# Patient Record
Sex: MALE | Race: WHITE | NOT HISPANIC OR LATINO | Employment: OTHER | URBAN - METROPOLITAN AREA
[De-identification: names, ages, dates, MRNs, and addresses within clinical notes are randomized per-mention and may not be internally consistent; named-entity substitution may affect disease eponyms.]

---

## 2022-11-11 ENCOUNTER — HOSPITAL ENCOUNTER (INPATIENT)
Facility: HOSPITAL | Age: 82
LOS: 6 days | Discharge: HOME | DRG: 378 | End: 2022-11-17
Attending: STUDENT IN AN ORGANIZED HEALTH CARE EDUCATION/TRAINING PROGRAM | Admitting: INTERNAL MEDICINE
Payer: MEDICARE

## 2022-11-11 DIAGNOSIS — D64.9 SYMPTOMATIC ANEMIA: Primary | ICD-10-CM

## 2022-11-11 DIAGNOSIS — C79.9 METASTATIC CARCINOMA: ICD-10-CM

## 2022-11-11 DIAGNOSIS — K92.2 UPPER GI BLEEDING: ICD-10-CM

## 2022-11-11 DIAGNOSIS — R19.5 DARK STOOLS: ICD-10-CM

## 2022-11-11 PROBLEM — R19.00 ABDOMINAL MASS: Status: ACTIVE | Noted: 2022-11-11

## 2022-11-11 LAB
ALBUMIN SERPL-MCNC: 3.3 GM/DL (ref 3.4–4.8)
ALBUMIN/GLOB SERPL: 0.7 RATIO (ref 1.1–2)
ALP SERPL-CCNC: 125 UNIT/L (ref 40–150)
ALT SERPL-CCNC: 11 UNIT/L (ref 0–55)
APPEARANCE UR: CLEAR
APTT PPP: 31.8 SECONDS (ref 23.2–33.7)
AST SERPL-CCNC: 22 UNIT/L (ref 5–34)
BACTERIA #/AREA URNS AUTO: NORMAL /HPF
BASOPHILS # BLD AUTO: 0.16 X10(3)/MCL (ref 0–0.2)
BASOPHILS NFR BLD AUTO: 0.7 %
BILIRUB UR QL STRIP.AUTO: NEGATIVE MG/DL
BILIRUBIN DIRECT+TOT PNL SERPL-MCNC: 0.5 MG/DL
BUN SERPL-MCNC: 28.4 MG/DL (ref 8.4–25.7)
CALCIUM SERPL-MCNC: 9.9 MG/DL (ref 8.8–10)
CHLORIDE SERPL-SCNC: 103 MMOL/L (ref 98–107)
CO2 SERPL-SCNC: 26 MMOL/L (ref 23–31)
COLOR UR AUTO: YELLOW
CREAT SERPL-MCNC: 1.32 MG/DL (ref 0.73–1.18)
EOSINOPHIL # BLD AUTO: 0.54 X10(3)/MCL (ref 0–0.9)
EOSINOPHIL NFR BLD AUTO: 2.2 %
ERYTHROCYTE [DISTWIDTH] IN BLOOD BY AUTOMATED COUNT: 15.5 % (ref 11.5–17)
GFR SERPLBLD CREATININE-BSD FMLA CKD-EPI: 55 MLS/MIN/1.73/M2
GLOBULIN SER-MCNC: 4.6 GM/DL (ref 2.4–3.5)
GLUCOSE SERPL-MCNC: 111 MG/DL (ref 82–115)
GLUCOSE UR QL STRIP.AUTO: NEGATIVE MG/DL
HCT VFR BLD AUTO: 26.7 % (ref 42–52)
HGB BLD-MCNC: 8.8 GM/DL (ref 14–18)
IMM GRANULOCYTES # BLD AUTO: 0.15 X10(3)/MCL (ref 0–0.04)
IMM GRANULOCYTES NFR BLD AUTO: 0.6 %
INR BLD: 1.12 (ref 0–1.3)
KETONES UR QL STRIP.AUTO: NEGATIVE MG/DL
LACTATE SERPL-SCNC: 1.2 MMOL/L (ref 0.5–2.2)
LEUKOCYTE ESTERASE UR QL STRIP.AUTO: NEGATIVE UNIT/L
LIPASE SERPL-CCNC: 16 U/L
LYMPHOCYTES # BLD AUTO: 2.61 X10(3)/MCL (ref 0.6–4.6)
LYMPHOCYTES NFR BLD AUTO: 10.7 %
MAGNESIUM SERPL-MCNC: 2.1 MG/DL (ref 1.6–2.6)
MCH RBC QN AUTO: 32.8 PG (ref 27–31)
MCHC RBC AUTO-ENTMCNC: 33 MG/DL (ref 33–36)
MCV RBC AUTO: 99.6 FL (ref 80–94)
MONOCYTES # BLD AUTO: 1.65 X10(3)/MCL (ref 0.1–1.3)
MONOCYTES NFR BLD AUTO: 6.7 %
NEUTROPHILS # BLD AUTO: 19.4 X10(3)/MCL (ref 2.1–9.2)
NEUTROPHILS NFR BLD AUTO: 79.1 %
NITRITE UR QL STRIP.AUTO: NEGATIVE
NRBC BLD AUTO-RTO: 0 %
PH UR STRIP.AUTO: 5 [PH]
PLATELET # BLD AUTO: 394 X10(3)/MCL (ref 130–400)
PMV BLD AUTO: 8.2 FL (ref 7.4–10.4)
POTASSIUM SERPL-SCNC: 4.9 MMOL/L (ref 3.5–5.1)
PROT SERPL-MCNC: 7.9 GM/DL (ref 5.8–7.6)
PROT UR QL STRIP.AUTO: NEGATIVE MG/DL
PROTHROMBIN TIME: 14.3 SECONDS (ref 12.5–14.5)
RBC # BLD AUTO: 2.68 X10(6)/MCL (ref 4.7–6.1)
RBC #/AREA URNS AUTO: <5 /HPF
RBC UR QL AUTO: NEGATIVE UNIT/L
SODIUM SERPL-SCNC: 134 MMOL/L (ref 136–145)
SP GR UR STRIP.AUTO: 1.02 (ref 1–1.03)
SQUAMOUS #/AREA URNS AUTO: <5 /HPF
UROBILINOGEN UR STRIP-ACNC: 1 MG/DL
WBC # SPEC AUTO: 24.5 X10(3)/MCL (ref 4.5–11.5)
WBC #/AREA URNS AUTO: <5 /HPF

## 2022-11-11 PROCEDURE — 63600175 PHARM REV CODE 636 W HCPCS: Performed by: PHYSICIAN ASSISTANT

## 2022-11-11 PROCEDURE — 96374 THER/PROPH/DIAG INJ IV PUSH: CPT

## 2022-11-11 PROCEDURE — 11000001 HC ACUTE MED/SURG PRIVATE ROOM

## 2022-11-11 PROCEDURE — C9113 INJ PANTOPRAZOLE SODIUM, VIA: HCPCS | Performed by: PHYSICIAN ASSISTANT

## 2022-11-11 PROCEDURE — 83690 ASSAY OF LIPASE: CPT | Performed by: NURSE PRACTITIONER

## 2022-11-11 PROCEDURE — 25500020 PHARM REV CODE 255: Performed by: PHYSICIAN ASSISTANT

## 2022-11-11 PROCEDURE — 85730 THROMBOPLASTIN TIME PARTIAL: CPT | Performed by: NURSE PRACTITIONER

## 2022-11-11 PROCEDURE — 83735 ASSAY OF MAGNESIUM: CPT | Performed by: NURSE PRACTITIONER

## 2022-11-11 PROCEDURE — 87040 BLOOD CULTURE FOR BACTERIA: CPT | Performed by: NURSE PRACTITIONER

## 2022-11-11 PROCEDURE — 85025 COMPLETE CBC W/AUTO DIFF WBC: CPT | Performed by: NURSE PRACTITIONER

## 2022-11-11 PROCEDURE — 80053 COMPREHEN METABOLIC PANEL: CPT | Performed by: NURSE PRACTITIONER

## 2022-11-11 PROCEDURE — 99285 EMERGENCY DEPT VISIT HI MDM: CPT | Mod: 25

## 2022-11-11 PROCEDURE — 85610 PROTHROMBIN TIME: CPT | Performed by: NURSE PRACTITIONER

## 2022-11-11 PROCEDURE — 83605 ASSAY OF LACTIC ACID: CPT | Performed by: NURSE PRACTITIONER

## 2022-11-11 PROCEDURE — 81001 URINALYSIS AUTO W/SCOPE: CPT | Performed by: NURSE PRACTITIONER

## 2022-11-11 RX ORDER — CALCIUM CARBONATE/VITAMIN D3 400-133.3
TABLET ORAL DAILY
COMMUNITY
Start: 2022-09-16 | End: 2022-11-21 | Stop reason: CLARIF

## 2022-11-11 RX ORDER — AMLODIPINE BESYLATE 5 MG/1
5 TABLET ORAL DAILY
Status: DISCONTINUED | OUTPATIENT
Start: 2022-11-12 | End: 2022-11-17 | Stop reason: HOSPADM

## 2022-11-11 RX ORDER — ERGOCALCIFEROL 1.25 MG/1
50000 CAPSULE ORAL
COMMUNITY
Start: 2022-10-28 | End: 2022-11-20

## 2022-11-11 RX ORDER — ASPIRIN 81 MG/1
81 TABLET ORAL DAILY
COMMUNITY
Start: 2022-10-16 | End: 2022-11-20

## 2022-11-11 RX ORDER — TALC
6 POWDER (GRAM) TOPICAL NIGHTLY PRN
Status: DISCONTINUED | OUTPATIENT
Start: 2022-11-12 | End: 2022-11-17 | Stop reason: HOSPADM

## 2022-11-11 RX ORDER — QUINAPRIL 40 MG/1
40 TABLET ORAL DAILY
COMMUNITY
Start: 2022-10-08 | End: 2022-11-20

## 2022-11-11 RX ORDER — ALLOPURINOL 300 MG/1
300 TABLET ORAL DAILY
Status: DISCONTINUED | OUTPATIENT
Start: 2022-11-12 | End: 2022-11-17 | Stop reason: HOSPADM

## 2022-11-11 RX ORDER — AMLODIPINE BESYLATE 5 MG/1
5 TABLET ORAL DAILY
COMMUNITY
Start: 2022-07-19 | End: 2022-11-20

## 2022-11-11 RX ORDER — SODIUM CHLORIDE, SODIUM LACTATE, POTASSIUM CHLORIDE, CALCIUM CHLORIDE 600; 310; 30; 20 MG/100ML; MG/100ML; MG/100ML; MG/100ML
INJECTION, SOLUTION INTRAVENOUS CONTINUOUS
Status: DISCONTINUED | OUTPATIENT
Start: 2022-11-12 | End: 2022-11-16

## 2022-11-11 RX ORDER — ROSUVASTATIN CALCIUM 5 MG/1
5 TABLET, COATED ORAL DAILY
COMMUNITY
Start: 2022-09-18 | End: 2022-11-20

## 2022-11-11 RX ORDER — ATORVASTATIN CALCIUM 10 MG/1
20 TABLET, FILM COATED ORAL DAILY
Status: DISCONTINUED | OUTPATIENT
Start: 2022-11-12 | End: 2022-11-17 | Stop reason: HOSPADM

## 2022-11-11 RX ORDER — RANOLAZINE 500 MG/1
500 TABLET, EXTENDED RELEASE ORAL 2 TIMES DAILY
COMMUNITY
Start: 2022-10-16 | End: 2022-11-20

## 2022-11-11 RX ORDER — RANOLAZINE 500 MG/1
500 TABLET, EXTENDED RELEASE ORAL 2 TIMES DAILY
Status: DISCONTINUED | OUTPATIENT
Start: 2022-11-12 | End: 2022-11-17 | Stop reason: HOSPADM

## 2022-11-11 RX ORDER — ALLOPURINOL 300 MG/1
300 TABLET ORAL DAILY
COMMUNITY
Start: 2022-09-09 | End: 2022-11-20

## 2022-11-11 RX ORDER — PANTOPRAZOLE SODIUM 40 MG/10ML
40 INJECTION, POWDER, LYOPHILIZED, FOR SOLUTION INTRAVENOUS
Status: COMPLETED | OUTPATIENT
Start: 2022-11-11 | End: 2022-11-11

## 2022-11-11 RX ORDER — TICAGRELOR 60 MG/1
60 TABLET ORAL 2 TIMES DAILY
COMMUNITY
Start: 2022-10-13 | End: 2022-11-21 | Stop reason: CLARIF

## 2022-11-11 RX ORDER — LISINOPRIL 5 MG/1
5 TABLET ORAL DAILY
Status: DISCONTINUED | OUTPATIENT
Start: 2022-11-12 | End: 2022-11-17 | Stop reason: HOSPADM

## 2022-11-11 RX ORDER — NITROGLYCERIN 0.4 MG/1
0.4 TABLET SUBLINGUAL
COMMUNITY
Start: 2022-02-24

## 2022-11-11 RX ORDER — PANTOPRAZOLE SODIUM 40 MG/10ML
40 INJECTION, POWDER, LYOPHILIZED, FOR SOLUTION INTRAVENOUS EVERY 12 HOURS
Status: DISCONTINUED | OUTPATIENT
Start: 2022-11-12 | End: 2022-11-17 | Stop reason: HOSPADM

## 2022-11-11 RX ORDER — SODIUM CHLORIDE 0.9 % (FLUSH) 0.9 %
10 SYRINGE (ML) INJECTION
Status: DISCONTINUED | OUTPATIENT
Start: 2022-11-12 | End: 2022-11-17 | Stop reason: HOSPADM

## 2022-11-11 RX ADMIN — IOPAMIDOL 100 ML: 755 INJECTION, SOLUTION INTRAVENOUS at 06:11

## 2022-11-11 RX ADMIN — PANTOPRAZOLE SODIUM 40 MG: 40 INJECTION, POWDER, FOR SOLUTION INTRAVENOUS at 07:11

## 2022-11-11 NOTE — ED PROVIDER NOTES
Encounter Date: 11/11/2022       History     Chief Complaint   Patient presents with    Abnormal Lab     Pt sent from PCP for elevated WBC.  States has been having abd pain for last few months.  States very dark stool x 1 week.  Denies n/v/d.  Also c/o left sided flank pain.      82-year-old male came in for further evaluation.  His primary physician Dr. Iam Rojo has been monitoring his white blood cell count and hemoglobin for the last week.  Patient states that he has very dark stool for about a week.  He does feel nauseated but not vomiting.  Complaints of left-sided flank pain.  He did follow up with GI in September with Dr. Hitchcock and he was told that everything was normal .       Abdominal Pain  The current episode started several days ago. The problem has not changed since onset.The abdominal pain is located in the epigastric region. The abdominal pain radiates to the left flank. The abdominal pain is relieved by nothing. The other symptoms of the illness include nausea. The other symptoms of the illness do not include fever, jaundice, shortness of breath, vomiting, diarrhea or dysuria.   Nausea began 6 to 7 days ago.   Risk factors for an acute abdominal problem include being elderly. Additional symptoms associated with the illness include chills and back pain. Symptoms associated with the illness do not include anorexia or heartburn.   Review of patient's allergies indicates:  No Known Allergies  No past medical history on file.  No past surgical history on file.  No family history on file.     Review of Systems   Constitutional:  Positive for chills. Negative for fever.   HENT:  Negative for sore throat.    Respiratory:  Negative for shortness of breath.    Cardiovascular:  Negative for chest pain.   Gastrointestinal:  Positive for abdominal pain and nausea. Negative for anorexia, diarrhea, heartburn, jaundice and vomiting.   Genitourinary:  Negative for dysuria.   Musculoskeletal:  Positive for back  pain.   Skin:  Negative for rash.   Neurological:  Negative for weakness.   Hematological:  Does not bruise/bleed easily.     Physical Exam     Initial Vitals [11/11/22 1226]   BP Pulse Resp Temp SpO2   122/61 71 19 97.3 °F (36.3 °C) 99 %      MAP       --         Physical Exam    Nursing note and vitals reviewed.  Constitutional: He appears well-developed and well-nourished.   Cardiovascular:  Normal rate.           Pulmonary/Chest: Breath sounds normal.   Abdominal: Bowel sounds are normal. There is generalized abdominal tenderness and tenderness in the epigastric area.   There is left CVA tenderness. There is no tenderness at McBurney's point and negative Short's sign.     Neurological: He is alert and oriented to person, place, and time. He has normal strength.   Skin: Skin is warm.   Psychiatric: His behavior is normal. Judgment and thought content normal.       ED Course   Procedures  Labs Reviewed   CBC WITH DIFFERENTIAL - Abnormal; Notable for the following components:       Result Value    WBC 24.5 (*)     RBC 2.68 (*)     Hgb 8.8 (*)     Hct 26.7 (*)     MCV 99.6 (*)     MCH 32.8 (*)     Neut # 19.4 (*)     Mono # 1.65 (*)     IG# 0.15 (*)     All other components within normal limits   COMPREHENSIVE METABOLIC PANEL - Abnormal; Notable for the following components:    Sodium Level 134 (*)     Blood Urea Nitrogen 28.4 (*)     Creatinine 1.32 (*)     Protein Total 7.9 (*)     Albumin Level 3.3 (*)     Globulin 4.6 (*)     Albumin/Globulin Ratio 0.7 (*)     All other components within normal limits   LACTIC ACID, PLASMA - Normal   PROTIME-INR - Normal   APTT - Normal   LIPASE - Normal   MAGNESIUM - Normal   URINALYSIS, REFLEX TO URINE CULTURE - Normal   URINALYSIS, MICROSCOPIC - Normal   BLOOD CULTURE OLG   BLOOD CULTURE OLG          Imaging Results              CT Abdomen Pelvis With Contrast (Final result)  Result time 11/11/22 18:21:26      Final result by Jj Granado MD (11/11/22 18:21:26)                    Impression:      Very large aggressive mass seen in the left upper quadrant.  It involves the tail the pancreas and the stomach and the spleen.  It is difficult to tell with their the masses originating from the stomach or the pancreas.    There is metastasis seen in the liver in segment 4 and segment 6    Right lower lobe lung nodules concerning for metastatic disease      Electronically signed by: Jj Joshstuart  Date:    11/11/2022  Time:    18:21               Narrative:    EXAMINATION:  CT ABDOMEN PELVIS WITH CONTRAST    CLINICAL HISTORY:  Abdominal pain, acute, nonlocalized;    TECHNIQUE:  Low dose axial images, sagittal and coronal reformations were obtained from the lung bases to the pubic symphysis following the IV administration of contrast. Automatic exposure control (AEC) is utilized to reduce patient radiation exposure.    COMPARISON:  None.    FINDINGS:  There is a area of nodularity in the right lower lobe posterior basal aspect.  It measures 2 cm by 1.8 cm.  Another punctate nodule seen in the right lower lobe on image number 18 series 3.  It is 4 mm in size..    There is a mass seen in the dome of the liver is in segment 4.  It measures 3.3 by 3.3 cm.  There is another mass seen along the inferior margin liver on image number 30 of series 2.  It is seen in segment 6.  It measures 1.2 cm..    There is a large mass seen in the stomach involving the fundus of the stomach and the greater curvature.  There is circumferential wall thickening seen in the body of the stomach.  The mass has a heterogeneous appearance.  Rough measurements are 12 cm x 9 cm.  There is a mass also seen in the tail the pancreas which extends into the region of the spleen and the stomach.  It is uncertain if the mass is originating from the tail the pancreas or the stomach.  Mass extends into the left upper quadrant and measures 10 cm x 10 cm..    Gallbladder appears unremarkable.    The adrenal glands appear  normal.  No adrenal nodule is seen.    The kidneys appear normal.  There is a cyst in the left kidney in the midpole.  No hydronephrosis is seen.  No hydroureter is seen.  No nephrolithiasis is seen.  No obvious ureteral stones are seen.    Urinary bladder appears grossly unremarkable.    The prostate is enlarged in size..    No colitis is seen.  No diverticulitis is seen.  No obvious colonic mass or lesion is seen.    No free air is seen.  No free fluid is seen.                                       X-Ray Chest PA And Lateral (Final result)  Result time 11/11/22 12:51:08      Final result by Toby Lee MD (11/11/22 12:51:08)                   Impression:      No acute cardiopulmonary process identified.      Electronically signed by: Toby Lee  Date:    11/11/2022  Time:    12:51               Narrative:    EXAMINATION:  XR CHEST PA AND LATERAL    CLINICAL HISTORY:  cough;    TECHNIQUE:  Two-view    COMPARISON:  None available.    FINDINGS:  Cardiopericardial silhouette is within normal limits.  No acute dense focal or segmental consolidation, congestion, pleural effusion or pneumothorax.  Bilateral shoulder arthroplasties.                                       Medications   iopamidoL (ISOVUE-370) injection 100 mL (100 mLs Intravenous Given 11/11/22 1810)   pantoprazole injection 40 mg (40 mg Intravenous Given 11/11/22 1937)     Medical Decision Making:   Differential Diagnosis:   Upper GI bleed  Symptomatic anemia  Dark stool   Elevated white blood cell count     Clinical Tests:   Lab Tests: Reviewed  The following lab test(s) were unremarkable: CBC, CMP, Lipase, PTT, PT and Urinalysis  Radiological Study: Reviewed  Other:   I have discussed this case with another health care provider.       <> Summary of the Discussion: Discussed case with Dr. Matthews here at ED . Plan to Admit pt for Gi and Oncology consult.   Pt followed up with GI Dr. Hitchcock on 9/22/22 upper Barium IMPRESSION: Small sliding-type hiatal  hernia, mild dysmotility and  gastroesophageal reflux.    Spoke with [place] with Dr. Jayesh Han. Will be admitting patient . Plan follow up with GI and Oncology            ED Course as of 11/11/22 2047 Fri Nov 11, 2022 1707 Lipase: 16 [YG]   1707 Sodium(!): 134 [YG]   1707 Potassium: 4.9 [YG]   1707 Creatinine(!): 1.32 [YG]   1707 Albumin(!): 3.3 [YG]   1707 Globulin, Total(!): 4.6 [YG]   1707 Albumin/Globulin Ratio(!): 0.7 [YG]   1708 Lactate, [place]: 1.2 [YG]   1708 WBC(!): 24.5 [YG]   1708 Hemoglobin(!): 8.8 [YG]   1708 Hematocrit(!): 26.7 [YG]   1708 aPTT: 31.8 [YG]   1708 INR: 1.12 [YG]   1708 Ketones, UA: Negative [YG]   1708 Occult Blood UA: Negative [YG]   1708 Urobilinogen, UA: 1.0 [YG]   1911 Magnesium: 2.10 [YG]   1911 Lipase: 16 [YG]   1911 Sodium(!): 134 [YG]   1912 ALT: 11 [YG]   1912 AST: 22 [YG]   1912 WBC(!): 24.5 [YG]   1912 RBC(!): 2.68 [YG]   1912 Hemoglobin(!): 8.8 [YG]   1912 Hematocrit(!): 26.7 [YG]   1912 MCV(!): 99.6 [YG]   1912 MCH(!): 32.8 [YG]   1912 aPTT: 31.8 [YG]   1912 Protime: 14.3 [YG]   1912 INR: 1.12 [YG]   1912 RBC, UA: <5 [YG]   1912 Leukocytes, UA: Negative [YG]   1912 NITRITE UA: Negative [YG]      ED Course User Index  [YG] MELECIO Carlos                 Clinical Impression:   Final diagnoses:  [D64.9] Symptomatic anemia (Primary)  [R19.5] Dark stools  [K92.2] Upper GI bleeding  [C79.9] Metastatic carcinoma      ED Disposition Condition    Admit Stable                MELECIO Carlos  11/11/22 2003       MELECIO Carlos  11/11/22 2047

## 2022-11-11 NOTE — FIRST PROVIDER EVALUATION
Medical screening examination initiated.  I have conducted a focused provider triage encounter, findings are as follows:    Brief history of present illness:  81 y/o male presents with having outpatient labs which showed elevated wbc and some anemia. C/o upset stomach for months and left side pain. No n/v/d. States black stools x 1 week however he did take pepto bismol for a while for his upset stomach, stopped taking it recently. Mild cough.     There were no vitals filed for this visit.    Pertinent physical exam:  alert, nonlabored, ambulatory    Brief workup plan:  labs, urine    Preliminary workup initiated; this workup will be continued and followed by the physician or advanced practice provider that is assigned to the patient when roomed.

## 2022-11-12 LAB
ABO + RH BLD: NORMAL
ABORH RETYPE: NORMAL
ALBUMIN SERPL-MCNC: 2.9 GM/DL (ref 3.4–4.8)
ALBUMIN/GLOB SERPL: 0.9 RATIO (ref 1.1–2)
ALP SERPL-CCNC: 108 UNIT/L (ref 40–150)
ALT SERPL-CCNC: 11 UNIT/L (ref 0–55)
AST SERPL-CCNC: 19 UNIT/L (ref 5–34)
BASOPHILS # BLD AUTO: 0.1 X10(3)/MCL (ref 0–0.2)
BASOPHILS NFR BLD AUTO: 0.5 %
BILIRUBIN DIRECT+TOT PNL SERPL-MCNC: 0.5 MG/DL
BLD PROD TYP BPU: NORMAL
BLOOD UNIT EXPIRATION DATE: NORMAL
BLOOD UNIT TYPE CODE: 2800
BUN SERPL-MCNC: 24.7 MG/DL (ref 8.4–25.7)
CALCIUM SERPL-MCNC: 9 MG/DL (ref 8.8–10)
CHLORIDE SERPL-SCNC: 103 MMOL/L (ref 98–107)
CO2 SERPL-SCNC: 25 MMOL/L (ref 23–31)
COLOR STL: ABNORMAL
CONSISTENCY STL: ABNORMAL
CREAT SERPL-MCNC: 1.18 MG/DL (ref 0.73–1.18)
CROSSMATCH INTERPRETATION: NORMAL
DISPENSE STATUS: NORMAL
EOSINOPHIL # BLD AUTO: 0.47 X10(3)/MCL (ref 0–0.9)
EOSINOPHIL NFR BLD AUTO: 2.3 %
ERYTHROCYTE [DISTWIDTH] IN BLOOD BY AUTOMATED COUNT: 15.4 % (ref 11.5–17)
GFR SERPLBLD CREATININE-BSD FMLA CKD-EPI: >60 MLS/MIN/1.73/M2
GLOBULIN SER-MCNC: 3.3 GM/DL (ref 2.4–3.5)
GLUCOSE SERPL-MCNC: 109 MG/DL (ref 82–115)
GROUP & RH: NORMAL
HCT VFR BLD AUTO: 23.6 % (ref 42–52)
HEMOCCULT SP1 STL QL: POSITIVE
HGB BLD-MCNC: 7.6 GM/DL (ref 14–18)
IMM GRANULOCYTES # BLD AUTO: 0.12 X10(3)/MCL (ref 0–0.04)
IMM GRANULOCYTES NFR BLD AUTO: 0.6 %
INDIRECT COOMBS GEL: NORMAL
LYMPHOCYTES # BLD AUTO: 2.11 X10(3)/MCL (ref 0.6–4.6)
LYMPHOCYTES NFR BLD AUTO: 10.1 %
MCH RBC QN AUTO: 32.1 PG (ref 27–31)
MCHC RBC AUTO-ENTMCNC: 32.2 MG/DL (ref 33–36)
MCV RBC AUTO: 99.6 FL (ref 80–94)
MONOCYTES # BLD AUTO: 1.38 X10(3)/MCL (ref 0.1–1.3)
MONOCYTES NFR BLD AUTO: 6.6 %
NEUTROPHILS # BLD AUTO: 16.6 X10(3)/MCL (ref 2.1–9.2)
NEUTROPHILS NFR BLD AUTO: 79.9 %
NRBC BLD AUTO-RTO: 0 %
PLATELET # BLD AUTO: 358 X10(3)/MCL (ref 130–400)
PMV BLD AUTO: 8.1 FL (ref 7.4–10.4)
POTASSIUM SERPL-SCNC: 4.7 MMOL/L (ref 3.5–5.1)
PROT SERPL-MCNC: 6.2 GM/DL (ref 5.8–7.6)
RBC # BLD AUTO: 2.37 X10(6)/MCL (ref 4.7–6.1)
SODIUM SERPL-SCNC: 136 MMOL/L (ref 136–145)
UNIT NUMBER: NORMAL
WBC # SPEC AUTO: 20.8 X10(3)/MCL (ref 4.5–11.5)

## 2022-11-12 PROCEDURE — 36415 COLL VENOUS BLD VENIPUNCTURE: CPT | Performed by: INTERNAL MEDICINE

## 2022-11-12 PROCEDURE — 21400001 HC TELEMETRY ROOM

## 2022-11-12 PROCEDURE — P9016 RBC LEUKOCYTES REDUCED: HCPCS | Performed by: INTERNAL MEDICINE

## 2022-11-12 PROCEDURE — 86901 BLOOD TYPING SEROLOGIC RH(D): CPT | Performed by: INTERNAL MEDICINE

## 2022-11-12 PROCEDURE — 63600175 PHARM REV CODE 636 W HCPCS: Performed by: INTERNAL MEDICINE

## 2022-11-12 PROCEDURE — 86923 COMPATIBILITY TEST ELECTRIC: CPT | Performed by: INTERNAL MEDICINE

## 2022-11-12 PROCEDURE — C9113 INJ PANTOPRAZOLE SODIUM, VIA: HCPCS | Performed by: INTERNAL MEDICINE

## 2022-11-12 PROCEDURE — 80053 COMPREHEN METABOLIC PANEL: CPT | Performed by: INTERNAL MEDICINE

## 2022-11-12 PROCEDURE — 85025 COMPLETE CBC W/AUTO DIFF WBC: CPT | Performed by: INTERNAL MEDICINE

## 2022-11-12 PROCEDURE — 25000003 PHARM REV CODE 250: Performed by: INTERNAL MEDICINE

## 2022-11-12 PROCEDURE — 82270 OCCULT BLOOD FECES: CPT | Performed by: INTERNAL MEDICINE

## 2022-11-12 PROCEDURE — 36430 TRANSFUSION BLD/BLD COMPNT: CPT

## 2022-11-12 PROCEDURE — 63600175 PHARM REV CODE 636 W HCPCS

## 2022-11-12 RX ORDER — MORPHINE SULFATE 4 MG/ML
INJECTION, SOLUTION INTRAMUSCULAR; INTRAVENOUS
Status: COMPLETED
Start: 2022-11-12 | End: 2022-11-12

## 2022-11-12 RX ORDER — ONDANSETRON 2 MG/ML
4 INJECTION INTRAMUSCULAR; INTRAVENOUS EVERY 6 HOURS PRN
Status: DISCONTINUED | OUTPATIENT
Start: 2022-11-12 | End: 2022-11-17 | Stop reason: HOSPADM

## 2022-11-12 RX ORDER — ONDANSETRON 2 MG/ML
INJECTION INTRAMUSCULAR; INTRAVENOUS
Status: COMPLETED
Start: 2022-11-12 | End: 2022-11-12

## 2022-11-12 RX ORDER — HYDROCODONE BITARTRATE AND ACETAMINOPHEN 500; 5 MG/1; MG/1
TABLET ORAL
Status: DISCONTINUED | OUTPATIENT
Start: 2022-11-12 | End: 2022-11-17 | Stop reason: HOSPADM

## 2022-11-12 RX ORDER — MORPHINE SULFATE 4 MG/ML
2 INJECTION, SOLUTION INTRAMUSCULAR; INTRAVENOUS EVERY 4 HOURS PRN
Status: DISCONTINUED | OUTPATIENT
Start: 2022-11-12 | End: 2022-11-17 | Stop reason: HOSPADM

## 2022-11-12 RX ADMIN — RANOLAZINE 500 MG: 500 TABLET, EXTENDED RELEASE ORAL at 09:11

## 2022-11-12 RX ADMIN — SODIUM CHLORIDE, POTASSIUM CHLORIDE, SODIUM LACTATE AND CALCIUM CHLORIDE: 600; 310; 30; 20 INJECTION, SOLUTION INTRAVENOUS at 12:11

## 2022-11-12 RX ADMIN — SODIUM CHLORIDE, POTASSIUM CHLORIDE, SODIUM LACTATE AND CALCIUM CHLORIDE: 600; 310; 30; 20 INJECTION, SOLUTION INTRAVENOUS at 08:11

## 2022-11-12 RX ADMIN — PANTOPRAZOLE SODIUM 40 MG: 40 INJECTION, POWDER, LYOPHILIZED, FOR SOLUTION INTRAVENOUS at 09:11

## 2022-11-12 RX ADMIN — LISINOPRIL 5 MG: 5 TABLET ORAL at 09:11

## 2022-11-12 RX ADMIN — PANTOPRAZOLE SODIUM 40 MG: 40 INJECTION, POWDER, LYOPHILIZED, FOR SOLUTION INTRAVENOUS at 08:11

## 2022-11-12 RX ADMIN — MORPHINE SULFATE 2 MG: 4 INJECTION INTRAVENOUS at 12:11

## 2022-11-12 RX ADMIN — ALLOPURINOL 300 MG: 300 TABLET ORAL at 09:11

## 2022-11-12 RX ADMIN — RANOLAZINE 500 MG: 500 TABLET, EXTENDED RELEASE ORAL at 08:11

## 2022-11-12 RX ADMIN — MORPHINE SULFATE 2 MG: 4 INJECTION, SOLUTION INTRAMUSCULAR; INTRAVENOUS at 12:11

## 2022-11-12 RX ADMIN — ATORVASTATIN CALCIUM 20 MG: 10 TABLET, FILM COATED ORAL at 09:11

## 2022-11-12 RX ADMIN — ONDANSETRON 4 MG: 2 INJECTION INTRAMUSCULAR; INTRAVENOUS at 12:11

## 2022-11-12 NOTE — H&P
[facility] Hospital Medicine History & Physical Examination       Patient Name: Bari Vallecillo  MRN: [id number]  Patient Class: IP- Inpatient   Admission Date: 11/11/2022 12:30 PM  Length of Stay: 0  Admitting Service: Hospital Medicine   Attending Physician: Guille Mcconnell MD   Primary Care Provider: Primary Doctor No  History source: EMR, patient and/or patient's family    CHIEF COMPLAINT   Abnormal Lab (Pt sent from PCP for elevated WBC.  States has been having abd pain for last few months.  States very dark stool x 1 week.  Denies n/v/d.  Also c/o left sided flank pain. )    HISTORY OF PRESENT ILLNESS:   Patient is 82-year-old pleasant male with history of CAD/stents 5 years ago who was referred to the ER for persistent abdominal pain and increasing leukocytosis on outpatient laboratory work.  Patient explains in September 2022 he underwent both an EGD as well as a colonoscopy as part of workup for his abdominal pain and both of these were completely unremarkable.  Over the course of the last week he developed dark colored stools.  He denied any fever, chills, or other symptoms during this time.  His PCP obtain laboratory work today that showed anemia leukocytosis and he was referred here for further evaluation.  He arrived afebrile hemodynamically stable.  Laboratory work confirmed anemia and leukocytosis of 25 K but no other systemic signs of infection.  A CT of his abdomen was obtained and showed an aggressive appearing mass involving the pancreas/stomach.  Liver metastases were also noted.  Hospitalist service was consulted for admission.      PAST MEDICAL HISTORY:   CAD/stents  Hypertension    PAST SURGICAL HISTORY:   Shoulder surgery   Knee surgery    ALLERGIES:   Patient has no known allergies.    FAMILY HISTORY:   Reviewed and non-contributory     SOCIAL HISTORY:   Denies tobacco drug or alcohol use    HOME MEDICATIONS:     allopurinoL (ZYLOPRIM) 300 MG tablet Take  300 mg by mouth once daily.   amLODIPine (NORVASC) 5 MG tablet Take 5 mg by mouth once daily.   aspirin (ECOTRIN) 81 MG EC tablet Take 81 mg by mouth once daily.   BRILINTA 60 mg tablet Take 60 mg by mouth 2 (two) times daily.   ergocalciferol (ERGOCALCIFEROL) 50,000 unit C Take by mouth.   GENTLE LAXATIVE, BISACODYL, 5 mg EC tablet Take by mouth once daily.   quinapriL (ACCUPRIL) 40 MG tablet Take 40 mg by mouth once daily.   ranolazine (RANEXA) 500 MG Tb12 Take 500 mg by mouth 2 (two) times daily.   rosuvastatin (CRESTOR) 5 MG tablet Take 5 mg by mouth once daily.   nitroGLYCERIN (NITROSTAT) 0.4 MG SL tablet Place 0.4 mg under the tongue.       REVIEW OF SYSTEMS:   Except as documented, all other systems reviewed and negative     PHYSICAL EXAM:   T 97.3 °F (36.3 °C)   /68   P 73   RR (!) 25   O2 96 %  GENERAL: awake, alert, oriented and in no acute distress, non-toxic appearing   HEENT: normocephalic atraumatic   NECK: supple   LUNGS: Clear bilaterally, no wheezing or rales, no accessory muscle use   CVS: Regular rate and rhythm, normal peripheral perfusion  ABD: Soft, very mild epigastric tenderness, non-distended, bowel sounds present  EXTREMITIES: no clubbing or cyanosis  SKIN: Warm, dry.   NEURO: alert and oriented, grossly without focal deficits   PSYCHIATRIC: Cooperative    LABS AND IMAGING:     Recent Labs     11/11/22  1325   WBC 24.5*   RBC 2.68*   HGB 8.8*   HCT 26.7*   MCV 99.6*   MCH 32.8*   MCHC 33.0   RDW 15.5        Recent Labs     11/11/22  1325   LACTIC 1.2     Recent Labs     11/11/22  1324   INR 1.12     No results for input(s): HGBA1C, CHOL, TRIG, LDL, VLDL, HDL in the last 72 hours.   Recent Labs     11/11/22  1325   *   K 4.9   CHLORIDE 103   CO2 26   BUN 28.4*   CREATININE 1.32*   GLUCOSE 111   CALCIUM 9.9   MG 2.10   ALBUMIN 3.3*   GLOBULIN 4.6*   ALKPHOS 125   ALT 11   AST 22   BILITOT 0.5   LIPASE 16     No results for input(s): BNP, CPK, TROPONINI in the last 72  hours.       CT Abdomen Pelvis With Contrast  Impression: Very large aggressive mass seen in the left upper quadrant.  It involves the tail the pancreas and the stomach and the spleen.  It is difficult to tell with their the masses originating from the stomach or the pancreas.  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  Discussed with patient thoroughly and candidly at bedside the concern for malignancy and he expressed understanding.    -Will transfuse as needed for hemoglobin less than 7  -hold aspirin and Brilinta  -monitor for systemic signs of infection none at this time other than leukocytosis    DVT prophylaxis: SCDs  Code status: full     If patient was admitted under observational status it is with my approval/permission.     At least 55 min was spent on this history and physical.  Time seen: 11PM   Guille Mcconnell MD

## 2022-11-12 NOTE — PROGRESS NOTES
"[facility] Hospital Medicine Progress Note        Chief Complaint: Inpatient Follow-up for Abnormal labs    HPI:   Patient is 82-year-old pleasant male with history of CAD/stents 5 years ago who was referred to the ER for persistent abdominal pain and increasing leukocytosis on outpatient laboratory work.  Patient explains in September 2022 he underwent both an EGD as well as a colonoscopy as part of workup for his abdominal pain and both of these were completely unremarkable.  Over the course of the last week he developed dark colored stools.  He denied any fever, chills, or other symptoms during this time.  His PCP obtain laboratory work today that showed anemia leukocytosis and he was referred here for further evaluation.  He arrived afebrile hemodynamically stable.  Laboratory work confirmed anemia and leukocytosis of 25 K but no other systemic signs of infection.  A CT of his abdomen was obtained and showed an aggressive appearing mass involving the pancreas/stomach.  Liver metastases were also noted.  Hospitalist service was consulted for admission.         Interval Hx:   Afebrile overnight.  Hemodynamically stable.  Alert, comfortably resting in the bed.  Denies any abdominal pain nausea or vomiting.  As mentioned left flank pain intermittently.  Currently NPO for GI evaluation.  Denies any family history of malignancies.      Objective/physical exam:  Vitals:    11/12/22 0200 11/12/22 0332 11/12/22 0412 11/12/22 0739   BP: 130/72 128/68 134/66 (!) 132/54   BP Location: Right arm      Patient Position: Lying      Pulse: 69 65 68 71   Resp: 15 16 18    Temp:   98.2 °F (36.8 °C) 98.5 °F (36.9 °C)   TempSrc:    Oral   SpO2: 96% 96% 95% (!) 94%   Weight:   105.8 kg (233 lb 4.8 oz)    Height:   6' 3" (1.905 m)      General: In no acute distress, afebrile  Respiratory: Clear to auscultation bilaterally  Cardiovascular: S1, S2, no appreciable murmur  Abdomen: Soft, nontender, BS " +  MSK: Warm, no lower extremity edema, no clubbing or cyanosis  Neurologic: Alert and oriented x4, moving all extremities with good strength     Lab Results   Component Value Date     11/12/2022    K 4.7 11/12/2022    CO2 25 11/12/2022    BUN 24.7 11/12/2022    CREATININE 1.18 11/12/2022    CALCIUM 9.0 11/12/2022      Lab Results   Component Value Date    ALT 11 11/12/2022    AST 19 11/12/2022    ALKPHOS 108 11/12/2022    BILITOT 0.5 11/12/2022      Lab Results   Component Value Date    WBC 20.8 (H) 11/12/2022    HGB 7.6 (L) 11/12/2022    HCT 23.6 (L) 11/12/2022    MCV 99.6 (H) 11/12/2022     11/12/2022           Medications:   allopurinoL  300 mg Oral Daily    amLODIPine  5 mg Oral Daily    atorvastatin  20 mg Oral Daily    lisinopriL  5 mg Oral Daily    pantoprazole  40 mg Intravenous Q12H    ranolazine  500 mg Oral BID      melatonin, morphine, ondansetron, sodium chloride 0.9%     Assessment/Plan:    Melena with acute blood loss anemia   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  GI consulted., gentle hydration  -will transfuse 1 unit of blood.  Trend hemoglobin.  He stopped using Brilinta 2 weeks ago has recommended by his physician.  Hold aspirin  -continue other medical management, home medications    SCDs  Labs    Critical care diagnosis:  Acute blood loss anemia requiring blood transfusion   Critical care time: 50 minutes        Koffi Kellogg MD

## 2022-11-12 NOTE — PROGRESS NOTES
Inpatient Nutrition Assessment    Admit Date: 11/11/2022   Total duration of encounter: 1 day     Nutrition Recommendation/Prescription     Once medically feasible, goal regular diet as tolerated.   Add oral nutrition supplement Boost Original vanilla, once pt on at least full liquid diet. Will provide additional 240 kcal and 10g protein per serving.     Communication of Recommendations: reviewed with patient/caregiver    Nutrition Assessment     Malnutrition Assessment/Nutrition-Focused Physical Exam    Malnutrition in the context of acute illness or injury  Degree of Malnutrition: does not meet criteria  Energy Intake: does not meet criteria  Interpretation of Weight Loss: does not meet criteria  Body Fat:does not meet criteria  Area of Body Fat Loss: does not meet criteria  Muscle Mass Loss: does not meet criteria  Area of Muscle Mass Loss: does not meet criteria  Fluid Accumulation: unable to obtain  Edema: unable to obtain   Reduced  Strength: unable to obtain  A minimum of two characteristics is recommended for diagnosis of either severe or non-severe malnutrition.    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    Relevant Medical History: CAD/stents 5 years ago, HTN     Nutrition-Related Medications: pantoprazole, LR IVF's  Calorie Containing IV Medications: no significant kcals from medications at this time    Nutrition-Related Labs:  11/12/22: H/H 7.6/23.6    Diet/PN Order: Diet clear liquid  Oral Supplement Order: none  Tube Feeding Order: none  Appetite/Oral Intake: NPO/NPO  Factors Affecting Nutritional Intake: none identified  Food/Pentecostalism/Cultural Preferences: none reported  Food Allergies: none reported       Wound(s):   none noted    Comments    11/12/22: Pt with decreased appetite and unintentional wt loss per NSG nutrition  "screen on admit. Noted upset stomach and left side pain for months, black stools x1 week. Nausea began about 1 month ago. Pending GI consult, NPO <24hrs. Pt advanced to clear liquids during rounds. Pt states willing to trial oral nutrition supplement in vanilla flavor once diet advanced.    Anthropometrics    Height: 6' 3" (190.5 cm)    Last Weight: 105.8 kg (233 lb 4.8 oz) (11/12/22 0412) Weight Method: Bed Scale  BMI (Calculated): 29.2  BMI Classification: overweight (BMI 25-29.9)        Ideal Body Weight (IBW), Male: 196 lb     % Ideal Body Weight, Male (lb): 119.03 %                          Usual Weight Provided By: patient    Wt Readings from Last 5 Encounters:   11/12/22 105.8 kg (233 lb 4.8 oz)     Weight Change(s) Since Admission:  Admit Weight: 105.2 kg (232 lb) (11/11/22 1226)  Pt states about 5lbs unintentional wt loss over past 1 month, suggest 2% wt loss.     Estimated Needs    Weight Used For Calorie Calculations: 105.8 kg (233 lb 4 oz)  Energy Calorie Requirements (kcal): 2220 kcal/day (mifflin st jeor x 1.2 SF)  Energy Need Method: [place]-St Jeor  Weight Used For Protein Calculations: 105.8 kg (233 lb 4 oz)  Protein Requirements: 106 g/day (1 g/kg/d)  Fluid Requirements (mL): 2220 mL/day  Temp: 98.4 °F (36.9 °C)       Enteral Nutrition    Patient not receiving enteral nutrition at this time.    Parenteral Nutrition    Patient not receiving parenteral nutrition support at this time.    Evaluation of Received Nutrient Intake    Calories: not meeting estimated needs  Protein: not meeting estimated needs    Patient Education    Not applicable.    Nutrition Diagnosis     PES: Unintended weight loss related to poor appetite and nausea as evidenced by 2% wt loss over past 1 month. (new)    Interventions/Goals     Intervention(s): collaboration with other providers  Goal: Meet greater than 75% of nutritional needs by follow-up. (new)    Monitoring & Evaluation     Dietitian will monitor weight change and " electrolyte/renal panel.  Nutrition Risk/Follow-Up: moderate (follow-up in 3-5 days)   Please consult if re-assessment needed sooner.

## 2022-11-13 LAB
ALBUMIN SERPL-MCNC: 2.5 GM/DL (ref 3.4–4.8)
ALBUMIN/GLOB SERPL: 1 RATIO (ref 1.1–2)
ALP SERPL-CCNC: 92 UNIT/L (ref 40–150)
ALT SERPL-CCNC: 8 UNIT/L (ref 0–55)
AST SERPL-CCNC: 17 UNIT/L (ref 5–34)
BASOPHILS # BLD AUTO: 0.06 X10(3)/MCL (ref 0–0.2)
BASOPHILS NFR BLD AUTO: 0.4 %
BILIRUBIN DIRECT+TOT PNL SERPL-MCNC: 0.5 MG/DL
BUN SERPL-MCNC: 16.2 MG/DL (ref 8.4–25.7)
CALCIUM SERPL-MCNC: 8.4 MG/DL (ref 8.8–10)
CHLORIDE SERPL-SCNC: 103 MMOL/L (ref 98–107)
CO2 SERPL-SCNC: 25 MMOL/L (ref 23–31)
CREAT SERPL-MCNC: 0.97 MG/DL (ref 0.73–1.18)
EOSINOPHIL # BLD AUTO: 0.46 X10(3)/MCL (ref 0–0.9)
EOSINOPHIL NFR BLD AUTO: 2.7 %
ERYTHROCYTE [DISTWIDTH] IN BLOOD BY AUTOMATED COUNT: 16.4 % (ref 11.5–17)
GFR SERPLBLD CREATININE-BSD FMLA CKD-EPI: >60 MLS/MIN/1.73/M2
GLOBULIN SER-MCNC: 2.6 GM/DL (ref 2.4–3.5)
GLUCOSE SERPL-MCNC: 82 MG/DL (ref 82–115)
HCT VFR BLD AUTO: 17.9 % (ref 42–52)
HCT VFR BLD AUTO: 26.2 % (ref 42–52)
HGB BLD-MCNC: 5.9 GM/DL (ref 14–18)
HGB BLD-MCNC: 8.5 GM/DL (ref 14–18)
LYMPHOCYTES # BLD AUTO: 1.53 X10(3)/MCL (ref 0.6–4.6)
LYMPHOCYTES NFR BLD AUTO: 9.1 %
MAGNESIUM SERPL-MCNC: 1.8 MG/DL (ref 1.6–2.6)
MCH RBC QN AUTO: 32.2 PG (ref 27–31)
MCHC RBC AUTO-ENTMCNC: 33 MG/DL (ref 33–36)
MCV RBC AUTO: 97.8 FL (ref 80–94)
MONOCYTES # BLD AUTO: 1.2 X10(3)/MCL (ref 0.1–1.3)
MONOCYTES NFR BLD AUTO: 7.1 %
NEUTROPHILS # BLD AUTO: 13.6 X10(3)/MCL (ref 2.1–9.2)
NEUTROPHILS NFR BLD AUTO: 80.3 %
PLATELET # BLD AUTO: 305 X10(3)/MCL (ref 130–400)
PMV BLD AUTO: 9 FL (ref 7.4–10.4)
POTASSIUM SERPL-SCNC: 4.5 MMOL/L (ref 3.5–5.1)
PROT SERPL-MCNC: 5.1 GM/DL (ref 5.8–7.6)
RBC # BLD AUTO: 1.83 X10(6)/MCL (ref 4.7–6.1)
SODIUM SERPL-SCNC: 136 MMOL/L (ref 136–145)
WBC # SPEC AUTO: 16.9 X10(3)/MCL (ref 4.5–11.5)

## 2022-11-13 PROCEDURE — 80053 COMPREHEN METABOLIC PANEL: CPT | Performed by: INTERNAL MEDICINE

## 2022-11-13 PROCEDURE — 85014 HEMATOCRIT: CPT | Performed by: INTERNAL MEDICINE

## 2022-11-13 PROCEDURE — 63600175 PHARM REV CODE 636 W HCPCS: Performed by: INTERNAL MEDICINE

## 2022-11-13 PROCEDURE — 25000003 PHARM REV CODE 250: Performed by: INTERNAL MEDICINE

## 2022-11-13 PROCEDURE — 36415 COLL VENOUS BLD VENIPUNCTURE: CPT | Performed by: INTERNAL MEDICINE

## 2022-11-13 PROCEDURE — 21400001 HC TELEMETRY ROOM

## 2022-11-13 PROCEDURE — 85025 COMPLETE CBC W/AUTO DIFF WBC: CPT | Performed by: INTERNAL MEDICINE

## 2022-11-13 PROCEDURE — 85018 HEMOGLOBIN: CPT | Performed by: INTERNAL MEDICINE

## 2022-11-13 PROCEDURE — 83735 ASSAY OF MAGNESIUM: CPT | Performed by: INTERNAL MEDICINE

## 2022-11-13 PROCEDURE — C9113 INJ PANTOPRAZOLE SODIUM, VIA: HCPCS | Performed by: INTERNAL MEDICINE

## 2022-11-13 RX ADMIN — ONDANSETRON 4 MG: 2 INJECTION INTRAMUSCULAR; INTRAVENOUS at 07:11

## 2022-11-13 RX ADMIN — ATORVASTATIN CALCIUM 20 MG: 10 TABLET, FILM COATED ORAL at 09:11

## 2022-11-13 RX ADMIN — RANOLAZINE 500 MG: 500 TABLET, EXTENDED RELEASE ORAL at 08:11

## 2022-11-13 RX ADMIN — ALLOPURINOL 300 MG: 300 TABLET ORAL at 09:11

## 2022-11-13 RX ADMIN — PANTOPRAZOLE SODIUM 40 MG: 40 INJECTION, POWDER, LYOPHILIZED, FOR SOLUTION INTRAVENOUS at 09:11

## 2022-11-13 RX ADMIN — PANTOPRAZOLE SODIUM 40 MG: 40 INJECTION, POWDER, LYOPHILIZED, FOR SOLUTION INTRAVENOUS at 08:11

## 2022-11-13 RX ADMIN — LISINOPRIL 5 MG: 5 TABLET ORAL at 09:11

## 2022-11-13 RX ADMIN — RANOLAZINE 500 MG: 500 TABLET, EXTENDED RELEASE ORAL at 09:11

## 2022-11-13 NOTE — H&P (VIEW-ONLY)
Bari Vallecillo   MRN: [id number]   ADMISSION DATE: 2022  : 1940  AGE: 82 y.o.    DATE :  2022       PROVIDER: PHYLLIS NO    REASON FOR REFERRAL   Patient is 82-year-old pleasant male with history of CAD/stents 5 years ago who was referred to the ER for persistent abdominal pain and increasing leukocytosis on outpatient laboratory work.  Patient explains in 2022 he underwent both an EGD as well as a colonoscopy with Dr. Hitchcock in [place] as part of workup for his abdominal pain and both of these were completely unremarkable.  Over the course of the last week he developed dark colored stools.  He denied any fever, chills, or other symptoms during this time.  His PCP obtain laboratory work today that showed anemia leukocytosis and he was referred here for further evaluation.  He arrived afebrile hemodynamically stable.  Laboratory work confirmed anemia and leukocytosis of 25 K but no other systemic signs of infection.  A CT of his abdomen was obtained and showed an aggressive appearing mass involving the pancreas/stomach.  Liver metastases were also noted.  Hospitalist service was consulted for admission.       According to the patient and his wife, who was recently referred to [place] by his cardiologist Dr. Ayala.  The gastroenterology office called him and got all other information.  They do not recall the name of the gastroenterologist.    SUBJECTIVE:    Review of Systems   Patient reports some anorexia and decreased appetite in the last year or so.  He was also trying to lose weight.  He has lost more than 30 lbs over this period of time.  He describes some vague mid to lower abdominal discomfort in the last month or so.  Also reports some dark stools in the last couple of weeks.  He was on Brilinta which was discontinued couple of weeks ago by his cardiologist.  Since his upper endoscopy 2 months ago, has noted dark stool as mentioned above especially in the last 2 weeks.  No  hematemesis.  No hematochezia.  No significant cardiopulmonary symptoms at this time.    Review of patient's allergies indicates:  No Known Allergies      allopurinoL  300 mg Oral Daily    amLODIPine  5 mg Oral Daily    atorvastatin  20 mg Oral Daily    lisinopriL  5 mg Oral Daily    pantoprazole  40 mg Intravenous Q12H    ranolazine  500 mg Oral BID       There are no discontinued medications.      lactated ringers 100 mL/hr at 11/12/22 2024        No past medical history on file.   Social History     Socioeconomic History    Marital status:     No past surgical history on file.     No family history on file.       OBJECTIVE:     Vitals:    11/13/22 0040 11/13/22 0641 11/13/22 0822 11/13/22 1127   BP: 116/64 122/66 138/68 (!) 134/55   Pulse: 64 66 71 62   Resp:  18 18 18   Temp: 98 °F (36.7 °C) 98.3 °F (36.8 °C) 98.4 °F (36.9 °C) 99.1 °F (37.3 °C)   TempSrc: Oral Oral Oral Oral   SpO2: 97% 98% 95% (!) 93%   Weight:       Height:           Physical Exam   HEENT examination:  Pale conjunctiva, anicteric sclera   Neck: Supple.  Chest:  Decreased breath sounds bilaterally   Heart examination:  S1, S2 audible   Abdomen: Bowel sounds positive, soft, nontender.    Extremities:  No edema.      LABS    Recent Labs   Lab 11/11/22  1325 11/12/22  0544 11/13/22  0418 11/13/22  0933   WBC 24.5* 20.8* 16.9*  --    HGB 8.8* 7.6* 5.9* 8.5*   HCT 26.7* 23.6* 17.9* 26.2*    358 305  --       Recent Labs   Lab 11/11/22  1325 11/12/22  0544 11/13/22  0418   * 136 136   K 4.9 4.7 4.5   CO2 26 25 25   BUN 28.4* 24.7 16.2   CREATININE 1.32* 1.18 0.97   CALCIUM 9.9 9.0 8.4*   BILITOT 0.5 0.5 0.5   ALKPHOS 125 108 92   ALT 11 11 8   AST 22 19 17   GLUCOSE 111 109 82      Recent Labs   Lab 11/11/22  1324   INR 1.12    No results for input(s): AMYLASE in the last 168 hours.   Recent Labs   Lab 11/11/22  1324   INR 1.12   PTT 31.8           RESULTS: X-Ray Chest PA And Lateral    Result Date: 11/11/2022  EXAMINATION: XR  CHEST PA AND LATERAL CLINICAL HISTORY: cough; TECHNIQUE: Two-view COMPARISON: None available. FINDINGS: Cardiopericardial silhouette is within normal limits.  No acute dense focal or segmental consolidation, congestion, pleural effusion or pneumothorax.  Bilateral shoulder arthroplasties.     No acute cardiopulmonary process identified. Electronically signed by: Toby Lee Date:    11/11/2022 Time:    12:51    CT Abdomen Pelvis With Contrast    Result Date: 11/11/2022  EXAMINATION: CT ABDOMEN PELVIS WITH CONTRAST CLINICAL HISTORY: Abdominal pain, acute, nonlocalized; TECHNIQUE: Low dose axial images, sagittal and coronal reformations were obtained from the lung bases to the pubic symphysis following the IV administration of contrast. Automatic exposure control (AEC) is utilized to reduce patient radiation exposure. COMPARISON: None. FINDINGS: There is a area of nodularity in the right lower lobe posterior basal aspect.  It measures 2 cm by 1.8 cm.  Another punctate nodule seen in the right lower lobe on image number 18 series 3.  It is 4 mm in size.. There is a mass seen in the dome of the liver is in segment 4.  It measures 3.3 by 3.3 cm.  There is another mass seen along the inferior margin liver on image number 30 of series 2.  It is seen in segment 6.  It measures 1.2 cm.. There is a large mass seen in the stomach involving the fundus of the stomach and the greater curvature.  There is circumferential wall thickening seen in the body of the stomach.  The mass has a heterogeneous appearance.  Rough measurements are 12 cm x 9 cm.  There is a mass also seen in the tail the pancreas which extends into the region of the spleen and the stomach.  It is uncertain if the mass is originating from the tail the pancreas or the stomach.  Mass extends into the left upper quadrant and measures 10 cm x 10 cm.. Gallbladder appears unremarkable. The adrenal glands appear normal.  No adrenal nodule is seen. The kidneys appear  normal.  There is a cyst in the left kidney in the midpole.  No hydronephrosis is seen.  No hydroureter is seen.  No nephrolithiasis is seen.  No obvious ureteral stones are seen. Urinary bladder appears grossly unremarkable. The prostate is enlarged in size.. No colitis is seen.  No diverticulitis is seen.  No obvious colonic mass or lesion is seen. No free air is seen.  No free fluid is seen.     Very large aggressive mass seen in the left upper quadrant.  It involves the tail the pancreas and the stomach and the spleen.  It is difficult to tell with their the masses originating from the stomach or the pancreas. There is metastasis seen in the liver in segment 4 and segment 6 Right lower lobe lung nodules concerning for metastatic disease Electronically signed by: Jj Granado Date:    11/11/2022 Time:    18:21             ICD-10-CM ICD-9-CM   1. Symptomatic anemia  D64.9 285.9   2. Dark stools  R19.5 792.1   3. Upper GI bleeding  K92.2 578.9   4. Metastatic carcinoma  C79.9 199.1   5.      History of coronary artery disease.  6.      Abnormal CT scan with large intra-abdominal mass lesion highly suspicious for malignancy , most likely pancreatic.  Also concern for gastric malignancy although upper endoscopy was negative couple of months ago.      ASSESSMENT & PLAN:     Patient is 82-year-old pleasant male with history of CAD/stents 5 years ago who was referred to the ER for persistent abdominal pain and increasing leukocytosis on outpatient laboratory work.  Patient explains in September 2022 he underwent both an EGD as well as a colonoscopy with Dr. Hitchcock in [place] as part of workup for his abdominal pain and both of these were completely unremarkable.  Over the course of the last week he developed dark colored stools.  He denied any fever, chills, or other symptoms during this time.  His PCP obtain laboratory work today that showed anemia leukocytosis and he was referred here for further evaluation.   He arrived afebrile hemodynamically stable.  Laboratory work confirmed anemia and leukocytosis of 25 K but no other systemic signs of infection.  A CT of his abdomen was obtained and showed an aggressive appearing mass involving the pancreas/stomach.  Liver metastases were also noted.  Hospitalist service was consulted for admission.       According to the patient and his wife, who was recently referred to [place] by his cardiologist Dr. Ayala.  The gastroenterology office called him and got all other information.  They do not recall the name of the gastroenterologist.    Recommendations:  1. Patient would probably need repeat upper endoscopy.  He has history of dark stools as above in the last couple of weeks.  Previous EGD as well as colonoscopy was reported negative 2 months ago.  Please keep patient NPO after midnight in case if they are plans to perform EGD tomorrow.  Need to clarify patient's gastroenterologist to whom they were referred recently.  2.  Ppi for now.  3.  The patient would probably need additional imaging studies including possible endoscopic ultrasound.  4. Also recommend Surgical Oncology consult as these findings are highly suspicious for malignancy.  Obtain tumor markers including CA 19-9, CEA, alpha fetoprotein.    5. Need to confirm patient's primary gastroenterologist to whom he was referred recently in [place] per his cardiologist.  Patient and his wife do not remember the name of the gastroenterologist.  Above findings were discussed with the primary service in details.  Also discussed with the patient and his family along with the nursing staff.  Thank you for consultation.

## 2022-11-13 NOTE — PROGRESS NOTES
[facility] Hospital Medicine Progress Note        Chief Complaint: Inpatient Follow-up for Abnormal labs    HPI:   Patient is 82-year-old pleasant male with history of CAD/stents 5 years ago who was referred to the ER for persistent abdominal pain and increasing leukocytosis on outpatient laboratory work.  Patient explains in September 2022 he underwent both an EGD as well as a colonoscopy as part of workup for his abdominal pain and both of these were completely unremarkable.  Over the course of the last week he developed dark colored stools.  He denied any fever, chills, or other symptoms during this time.  His PCP obtain laboratory work today that showed anemia leukocytosis and he was referred here for further evaluation.  He arrived afebrile hemodynamically stable.  Laboratory work confirmed anemia and leukocytosis of 25 K but no other systemic signs of infection.  A CT of his abdomen was obtained and showed an aggressive appearing mass involving the pancreas/stomach.  Liver metastases were also noted.  Admitted to . Gi was consulted and he received blood transfusion.       Interval Hx:   HDS. No acute events. No BM. Hb low. Will plan to repeat and consider transfusion    Objective/physical exam:  Vitals:    11/12/22 1522 11/12/22 2037 11/13/22 0040 11/13/22 0641   BP: 120/62 122/61 116/64 122/66   BP Location: Right arm      Patient Position:       Pulse: 65 94 64 66   Resp: 18 18  18   Temp: 98.1 °F (36.7 °C) 98.3 °F (36.8 °C) 98 °F (36.7 °C) 98.3 °F (36.8 °C)   TempSrc: Oral Oral Oral Oral   SpO2: 95% 95% 97% 98%   Weight:       Height:         General: In no acute distress, afebrile  Respiratory: Clear to auscultation bilaterally  Cardiovascular: S1, S2, no appreciable murmur  Abdomen: Soft, nontender, BS +  MSK: Warm, no lower extremity edema, no clubbing or cyanosis  Neurologic: Alert and oriented x4, moving all extremities with good strength     Lab Results   Component  Value Date     11/13/2022    K 4.5 11/13/2022    CO2 25 11/13/2022    BUN 16.2 11/13/2022    CREATININE 0.97 11/13/2022    CALCIUM 8.4 (L) 11/13/2022      Lab Results   Component Value Date    ALT 8 11/13/2022    AST 17 11/13/2022    ALKPHOS 92 11/13/2022    BILITOT 0.5 11/13/2022      Lab Results   Component Value Date    WBC 20.8 (H) 11/12/2022    HGB 7.6 (L) 11/12/2022    HCT 23.6 (L) 11/12/2022    MCV 99.6 (H) 11/12/2022     11/12/2022           Medications:   allopurinoL  300 mg Oral Daily    amLODIPine  5 mg Oral Daily    atorvastatin  20 mg Oral Daily    lisinopriL  5 mg Oral Daily    pantoprazole  40 mg Intravenous Q12H    ranolazine  500 mg Oral BID      sodium chloride, melatonin, morphine, ondansetron, sodium chloride 0.9%     Assessment/Plan:    Melena with acute blood loss anemia   Left 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 Monitor. Continue PPI. GI consulted  - hold aspirin  -continue other medical management, home medications     SCDs  Labs      Koffi Kellogg MD

## 2022-11-13 NOTE — CONSULTS
Bari Vallecillo   MRN: [id number]   ADMISSION DATE: 2022  : 1940  AGE: 82 y.o.    DATE :  2022       PROVIDER: PHYLLIS NO    REASON FOR REFERRAL   Patient is 82-year-old pleasant male with history of CAD/stents 5 years ago who was referred to the ER for persistent abdominal pain and increasing leukocytosis on outpatient laboratory work.  Patient explains in 2022 he underwent both an EGD as well as a colonoscopy with Dr. Hitchcock in [place] as part of workup for his abdominal pain and both of these were completely unremarkable.  Over the course of the last week he developed dark colored stools.  He denied any fever, chills, or other symptoms during this time.  His PCP obtain laboratory work today that showed anemia leukocytosis and he was referred here for further evaluation.  He arrived afebrile hemodynamically stable.  Laboratory work confirmed anemia and leukocytosis of 25 K but no other systemic signs of infection.  A CT of his abdomen was obtained and showed an aggressive appearing mass involving the pancreas/stomach.  Liver metastases were also noted.  Hospitalist service was consulted for admission.       According to the patient and his wife, who was recently referred to [place] by his cardiologist Dr. Ayala.  The gastroenterology office called him and got all other information.  They do not recall the name of the gastroenterologist.    SUBJECTIVE:    Review of Systems   Patient reports some anorexia and decreased appetite in the last year or so.  He was also trying to lose weight.  He has lost more than 30 lbs over this period of time.  He describes some vague mid to lower abdominal discomfort in the last month or so.  Also reports some dark stools in the last couple of weeks.  He was on Brilinta which was discontinued couple of weeks ago by his cardiologist.  Since his upper endoscopy 2 months ago, has noted dark stool as mentioned above especially in the last 2 weeks.  No  hematemesis.  No hematochezia.  No significant cardiopulmonary symptoms at this time.    Review of patient's allergies indicates:  No Known Allergies      allopurinoL  300 mg Oral Daily    amLODIPine  5 mg Oral Daily    atorvastatin  20 mg Oral Daily    lisinopriL  5 mg Oral Daily    pantoprazole  40 mg Intravenous Q12H    ranolazine  500 mg Oral BID       There are no discontinued medications.      lactated ringers 100 mL/hr at 11/12/22 2024        No past medical history on file.   Social History     Socioeconomic History    Marital status:     No past surgical history on file.     No family history on file.       OBJECTIVE:     Vitals:    11/13/22 0040 11/13/22 0641 11/13/22 0822 11/13/22 1127   BP: 116/64 122/66 138/68 (!) 134/55   Pulse: 64 66 71 62   Resp:  18 18 18   Temp: 98 °F (36.7 °C) 98.3 °F (36.8 °C) 98.4 °F (36.9 °C) 99.1 °F (37.3 °C)   TempSrc: Oral Oral Oral Oral   SpO2: 97% 98% 95% (!) 93%   Weight:       Height:           Physical Exam   HEENT examination:  Pale conjunctiva, anicteric sclera   Neck: Supple.  Chest:  Decreased breath sounds bilaterally   Heart examination:  S1, S2 audible   Abdomen: Bowel sounds positive, soft, nontender.    Extremities:  No edema.      LABS    Recent Labs   Lab 11/11/22  1325 11/12/22  0544 11/13/22  0418 11/13/22  0933   WBC 24.5* 20.8* 16.9*  --    HGB 8.8* 7.6* 5.9* 8.5*   HCT 26.7* 23.6* 17.9* 26.2*    358 305  --       Recent Labs   Lab 11/11/22  1325 11/12/22  0544 11/13/22  0418   * 136 136   K 4.9 4.7 4.5   CO2 26 25 25   BUN 28.4* 24.7 16.2   CREATININE 1.32* 1.18 0.97   CALCIUM 9.9 9.0 8.4*   BILITOT 0.5 0.5 0.5   ALKPHOS 125 108 92   ALT 11 11 8   AST 22 19 17   GLUCOSE 111 109 82      Recent Labs   Lab 11/11/22  1324   INR 1.12    No results for input(s): AMYLASE in the last 168 hours.   Recent Labs   Lab 11/11/22  1324   INR 1.12   PTT 31.8           RESULTS: X-Ray Chest PA And Lateral    Result Date: 11/11/2022  EXAMINATION: XR  CHEST PA AND LATERAL CLINICAL HISTORY: cough; TECHNIQUE: Two-view COMPARISON: None available. FINDINGS: Cardiopericardial silhouette is within normal limits.  No acute dense focal or segmental consolidation, congestion, pleural effusion or pneumothorax.  Bilateral shoulder arthroplasties.     No acute cardiopulmonary process identified. Electronically signed by: Toby Lee Date:    11/11/2022 Time:    12:51    CT Abdomen Pelvis With Contrast    Result Date: 11/11/2022  EXAMINATION: CT ABDOMEN PELVIS WITH CONTRAST CLINICAL HISTORY: Abdominal pain, acute, nonlocalized; TECHNIQUE: Low dose axial images, sagittal and coronal reformations were obtained from the lung bases to the pubic symphysis following the IV administration of contrast. Automatic exposure control (AEC) is utilized to reduce patient radiation exposure. COMPARISON: None. FINDINGS: There is a area of nodularity in the right lower lobe posterior basal aspect.  It measures 2 cm by 1.8 cm.  Another punctate nodule seen in the right lower lobe on image number 18 series 3.  It is 4 mm in size.. There is a mass seen in the dome of the liver is in segment 4.  It measures 3.3 by 3.3 cm.  There is another mass seen along the inferior margin liver on image number 30 of series 2.  It is seen in segment 6.  It measures 1.2 cm.. There is a large mass seen in the stomach involving the fundus of the stomach and the greater curvature.  There is circumferential wall thickening seen in the body of the stomach.  The mass has a heterogeneous appearance.  Rough measurements are 12 cm x 9 cm.  There is a mass also seen in the tail the pancreas which extends into the region of the spleen and the stomach.  It is uncertain if the mass is originating from the tail the pancreas or the stomach.  Mass extends into the left upper quadrant and measures 10 cm x 10 cm.. Gallbladder appears unremarkable. The adrenal glands appear normal.  No adrenal nodule is seen. The kidneys appear  normal.  There is a cyst in the left kidney in the midpole.  No hydronephrosis is seen.  No hydroureter is seen.  No nephrolithiasis is seen.  No obvious ureteral stones are seen. Urinary bladder appears grossly unremarkable. The prostate is enlarged in size.. No colitis is seen.  No diverticulitis is seen.  No obvious colonic mass or lesion is seen. No free air is seen.  No free fluid is seen.     Very large aggressive mass seen in the left upper quadrant.  It involves the tail the pancreas and the stomach and the spleen.  It is difficult to tell with their the masses originating from the stomach or the pancreas. There is metastasis seen in the liver in segment 4 and segment 6 Right lower lobe lung nodules concerning for metastatic disease Electronically signed by: Jj Granado Date:    11/11/2022 Time:    18:21             ICD-10-CM ICD-9-CM   1. Symptomatic anemia  D64.9 285.9   2. Dark stools  R19.5 792.1   3. Upper GI bleeding  K92.2 578.9   4. Metastatic carcinoma  C79.9 199.1   5.      History of coronary artery disease.  6.      Abnormal CT scan with large intra-abdominal mass lesion highly suspicious for malignancy , most likely pancreatic.  Also concern for gastric malignancy although upper endoscopy was negative couple of months ago.      ASSESSMENT & PLAN:     Patient is 82-year-old pleasant male with history of CAD/stents 5 years ago who was referred to the ER for persistent abdominal pain and increasing leukocytosis on outpatient laboratory work.  Patient explains in September 2022 he underwent both an EGD as well as a colonoscopy with Dr. Hitchcock in [place] as part of workup for his abdominal pain and both of these were completely unremarkable.  Over the course of the last week he developed dark colored stools.  He denied any fever, chills, or other symptoms during this time.  His PCP obtain laboratory work today that showed anemia leukocytosis and he was referred here for further evaluation.   He arrived afebrile hemodynamically stable.  Laboratory work confirmed anemia and leukocytosis of 25 K but no other systemic signs of infection.  A CT of his abdomen was obtained and showed an aggressive appearing mass involving the pancreas/stomach.  Liver metastases were also noted.  Hospitalist service was consulted for admission.       According to the patient and his wife, who was recently referred to [place] by his cardiologist Dr. Ayala.  The gastroenterology office called him and got all other information.  They do not recall the name of the gastroenterologist.    Recommendations:  1. Patient would probably need repeat upper endoscopy.  He has history of dark stools as above in the last couple of weeks.  Previous EGD as well as colonoscopy was reported negative 2 months ago.  Please keep patient NPO after midnight in case if they are plans to perform EGD tomorrow.  Need to clarify patient's gastroenterologist to whom they were referred recently.  2.  Ppi for now.  3.  The patient would probably need additional imaging studies including possible endoscopic ultrasound.  4. Also recommend Surgical Oncology consult as these findings are highly suspicious for malignancy.  Obtain tumor markers including CA 19-9, CEA, alpha fetoprotein.    5. Need to confirm patient's primary gastroenterologist to whom he was referred recently in [place] per his cardiologist.  Patient and his wife do not remember the name of the gastroenterologist.  Above findings were discussed with the primary service in details.  Also discussed with the patient and his family along with the nursing staff.  Thank you for consultation.

## 2022-11-14 ENCOUNTER — ANESTHESIA EVENT (OUTPATIENT)
Dept: ENDOSCOPY | Facility: HOSPITAL | Age: 82
DRG: 378 | End: 2022-11-14
Payer: MEDICARE

## 2022-11-14 ENCOUNTER — ANESTHESIA (OUTPATIENT)
Dept: ENDOSCOPY | Facility: HOSPITAL | Age: 82
DRG: 378 | End: 2022-11-14
Payer: MEDICARE

## 2022-11-14 PROBLEM — D64.9 SYMPTOMATIC ANEMIA: Status: ACTIVE | Noted: 2022-11-14

## 2022-11-14 PROBLEM — K92.2 UPPER GI BLEEDING: Status: ACTIVE | Noted: 2022-11-14

## 2022-11-14 LAB
AFP-TM SERPL-MCNC: <2 NG/ML
ANION GAP SERPL CALC-SCNC: 7 MEQ/L
BASOPHILS # BLD AUTO: 0.11 X10(3)/MCL (ref 0–0.2)
BASOPHILS NFR BLD AUTO: 0.5 %
BUN SERPL-MCNC: 15.7 MG/DL (ref 8.4–25.7)
CALCIUM SERPL-MCNC: 8.7 MG/DL (ref 8.8–10)
CANCER AG19-9 SERPL-ACNC: 867.48 UNIT/ML (ref 0–37)
CEA SERPL-MCNC: 3.74 NG/ML (ref 0–3)
CHLORIDE SERPL-SCNC: 104 MMOL/L (ref 98–107)
CO2 SERPL-SCNC: 26 MMOL/L (ref 23–31)
CREAT SERPL-MCNC: 1.07 MG/DL (ref 0.73–1.18)
CREAT/UREA NIT SERPL: 15
EOSINOPHIL # BLD AUTO: 0.61 X10(3)/MCL (ref 0–0.9)
EOSINOPHIL NFR BLD AUTO: 2.8 %
ERYTHROCYTE [DISTWIDTH] IN BLOOD BY AUTOMATED COUNT: 16 % (ref 11.5–17)
GFR SERPLBLD CREATININE-BSD FMLA CKD-EPI: >60 MLS/MIN/1.73/M2
GLUCOSE SERPL-MCNC: 111 MG/DL (ref 82–115)
HCT VFR BLD AUTO: 24.2 % (ref 42–52)
HGB BLD-MCNC: 7.9 GM/DL (ref 14–18)
IMM GRANULOCYTES # BLD AUTO: 0.14 X10(3)/MCL (ref 0–0.04)
IMM GRANULOCYTES NFR BLD AUTO: 0.6 %
LYMPHOCYTES # BLD AUTO: 2.12 X10(3)/MCL (ref 0.6–4.6)
LYMPHOCYTES NFR BLD AUTO: 9.8 %
MAGNESIUM SERPL-MCNC: 2 MG/DL (ref 1.6–2.6)
MCH RBC QN AUTO: 32.1 PG (ref 27–31)
MCHC RBC AUTO-ENTMCNC: 32.6 MG/DL (ref 33–36)
MCV RBC AUTO: 98.4 FL (ref 80–94)
MONOCYTES # BLD AUTO: 1.41 X10(3)/MCL (ref 0.1–1.3)
MONOCYTES NFR BLD AUTO: 6.5 %
NEUTROPHILS # BLD AUTO: 17.2 X10(3)/MCL (ref 2.1–9.2)
NEUTROPHILS NFR BLD AUTO: 79.8 %
NRBC BLD AUTO-RTO: 0 %
PLATELET # BLD AUTO: 324 X10(3)/MCL (ref 130–400)
PMV BLD AUTO: 8.1 FL (ref 7.4–10.4)
POTASSIUM SERPL-SCNC: 4.6 MMOL/L (ref 3.5–5.1)
RBC # BLD AUTO: 2.46 X10(6)/MCL (ref 4.7–6.1)
SODIUM SERPL-SCNC: 137 MMOL/L (ref 136–145)
WBC # SPEC AUTO: 21.6 X10(3)/MCL (ref 4.5–11.5)

## 2022-11-14 PROCEDURE — 63600175 PHARM REV CODE 636 W HCPCS: Performed by: INTERNAL MEDICINE

## 2022-11-14 PROCEDURE — 37000009 HC ANESTHESIA EA ADD 15 MINS: Performed by: INTERNAL MEDICINE

## 2022-11-14 PROCEDURE — 43235 EGD DIAGNOSTIC BRUSH WASH: CPT | Performed by: INTERNAL MEDICINE

## 2022-11-14 PROCEDURE — 86301 IMMUNOASSAY TUMOR CA 19-9: CPT | Performed by: INTERNAL MEDICINE

## 2022-11-14 PROCEDURE — 85025 COMPLETE CBC W/AUTO DIFF WBC: CPT | Performed by: INTERNAL MEDICINE

## 2022-11-14 PROCEDURE — 80048 BASIC METABOLIC PNL TOTAL CA: CPT | Performed by: INTERNAL MEDICINE

## 2022-11-14 PROCEDURE — 83735 ASSAY OF MAGNESIUM: CPT | Performed by: INTERNAL MEDICINE

## 2022-11-14 PROCEDURE — 21400001 HC TELEMETRY ROOM

## 2022-11-14 PROCEDURE — 25000003 PHARM REV CODE 250: Performed by: NURSE ANESTHETIST, CERTIFIED REGISTERED

## 2022-11-14 PROCEDURE — 37000008 HC ANESTHESIA 1ST 15 MINUTES: Performed by: INTERNAL MEDICINE

## 2022-11-14 PROCEDURE — 36415 COLL VENOUS BLD VENIPUNCTURE: CPT | Performed by: INTERNAL MEDICINE

## 2022-11-14 PROCEDURE — 99223 1ST HOSP IP/OBS HIGH 75: CPT | Mod: ,,, | Performed by: SURGERY

## 2022-11-14 PROCEDURE — C9113 INJ PANTOPRAZOLE SODIUM, VIA: HCPCS | Performed by: INTERNAL MEDICINE

## 2022-11-14 PROCEDURE — 63600175 PHARM REV CODE 636 W HCPCS: Performed by: NURSE ANESTHETIST, CERTIFIED REGISTERED

## 2022-11-14 PROCEDURE — 82378 CARCINOEMBRYONIC ANTIGEN: CPT | Performed by: INTERNAL MEDICINE

## 2022-11-14 PROCEDURE — 82105 ALPHA-FETOPROTEIN SERUM: CPT | Performed by: INTERNAL MEDICINE

## 2022-11-14 PROCEDURE — 25000003 PHARM REV CODE 250: Performed by: INTERNAL MEDICINE

## 2022-11-14 PROCEDURE — 25000003 PHARM REV CODE 250: Performed by: ANESTHESIOLOGY

## 2022-11-14 PROCEDURE — 99223 PR INITIAL HOSPITAL CARE,LEVL III: ICD-10-PCS | Mod: ,,, | Performed by: SURGERY

## 2022-11-14 RX ORDER — SODIUM CHLORIDE, SODIUM GLUCONATE, SODIUM ACETATE, POTASSIUM CHLORIDE AND MAGNESIUM CHLORIDE 30; 37; 368; 526; 502 MG/100ML; MG/100ML; MG/100ML; MG/100ML; MG/100ML
INJECTION, SOLUTION INTRAVENOUS CONTINUOUS
Status: DISCONTINUED | OUTPATIENT
Start: 2022-11-14 | End: 2022-11-14

## 2022-11-14 RX ORDER — PROPOFOL 10 MG/ML
VIAL (ML) INTRAVENOUS
Status: DISCONTINUED | OUTPATIENT
Start: 2022-11-14 | End: 2022-11-14

## 2022-11-14 RX ORDER — LIDOCAINE HYDROCHLORIDE 20 MG/ML
INJECTION, SOLUTION EPIDURAL; INFILTRATION; INTRACAUDAL; PERINEURAL
Status: DISCONTINUED | OUTPATIENT
Start: 2022-11-14 | End: 2022-11-14

## 2022-11-14 RX ORDER — PROPOFOL 10 MG/ML
VIAL (ML) INTRAVENOUS
Status: DISPENSED
Start: 2022-11-14 | End: 2022-11-15

## 2022-11-14 RX ORDER — LIDOCAINE HYDROCHLORIDE 10 MG/ML
1 INJECTION, SOLUTION EPIDURAL; INFILTRATION; INTRACAUDAL; PERINEURAL ONCE
Status: DISCONTINUED | OUTPATIENT
Start: 2022-11-14 | End: 2022-11-14

## 2022-11-14 RX ADMIN — SODIUM CHLORIDE, SODIUM GLUCONATE, SODIUM ACETATE, POTASSIUM CHLORIDE AND MAGNESIUM CHLORIDE: 526; 502; 368; 37; 30 INJECTION, SOLUTION INTRAVENOUS at 03:11

## 2022-11-14 RX ADMIN — SODIUM CHLORIDE, POTASSIUM CHLORIDE, SODIUM LACTATE AND CALCIUM CHLORIDE: 600; 310; 30; 20 INJECTION, SOLUTION INTRAVENOUS at 09:11

## 2022-11-14 RX ADMIN — LIDOCAINE HYDROCHLORIDE 50 MG: 20 INJECTION, SOLUTION INTRAVENOUS at 03:11

## 2022-11-14 RX ADMIN — ONDANSETRON 4 MG: 2 INJECTION INTRAMUSCULAR; INTRAVENOUS at 09:11

## 2022-11-14 RX ADMIN — PROPOFOL 50 MG: 10 INJECTION, EMULSION INTRAVENOUS at 03:11

## 2022-11-14 RX ADMIN — RANOLAZINE 500 MG: 500 TABLET, EXTENDED RELEASE ORAL at 09:11

## 2022-11-14 RX ADMIN — PANTOPRAZOLE SODIUM 40 MG: 40 INJECTION, POWDER, LYOPHILIZED, FOR SOLUTION INTRAVENOUS at 09:11

## 2022-11-14 NOTE — PROGRESS NOTES
"Gastroenterology Progress Note    Subjective/Interval History:  Abnormal CT imaging and anemia    ROS:  ROS    Vital Signs:  /63   Pulse (!) 59   Temp 97.9 °F (36.6 °C)   Resp 15   Ht 6' 3" (1.905 m)   Wt 105.8 kg (233 lb 4.8 oz)   SpO2 (!) 94%   BMI 29.16 kg/m²   Body mass index is 29.16 kg/m².    Physical Exam:  Physical Exam    Labs:  Recent Results (from the past 48 hour(s))   Comprehensive Metabolic Panel    Collection Time: 11/13/22  4:18 AM   Result Value Ref Range    Sodium Level 136 136 - 145 mmol/L    Potassium Level 4.5 3.5 - 5.1 mmol/L    Chloride 103 98 - 107 mmol/L    Carbon Dioxide 25 23 - 31 mmol/L    Glucose Level 82 82 - 115 mg/dL    Blood Urea Nitrogen 16.2 8.4 - 25.7 mg/dL    Creatinine 0.97 0.73 - 1.18 mg/dL    Calcium Level Total 8.4 (L) 8.8 - 10.0 mg/dL    Protein Total 5.1 (L) 5.8 - 7.6 gm/dL    Albumin Level 2.5 (L) 3.4 - 4.8 gm/dL    Globulin 2.6 2.4 - 3.5 gm/dL    Albumin/Globulin Ratio 1.0 (L) 1.1 - 2.0 ratio    Bilirubin Total 0.5 <=1.5 mg/dL    Alkaline Phosphatase 92 40 - 150 unit/L    Alanine Aminotransferase 8 0 - 55 unit/L    Aspartate Aminotransferase 17 5 - 34 unit/L    eGFR >60 mls/min/1.73/m2   CBC with Differential    Collection Time: 11/13/22  4:18 AM   Result Value Ref Range    WBC 16.9 (H) 4.5 - 11.5 x10(3)/mcL    RBC 1.83 (L) 4.70 - 6.10 x10(6)/mcL    Hgb 5.9 (LL) 14.0 - 18.0 gm/dL    Hct 17.9 (LL) 42.0 - 52.0 %    MCV 97.8 (H) 80.0 - 94.0 fL    MCH 32.2 (H) 27.0 - 31.0 pg    MCHC 33.0 33.0 - 36.0 mg/dL    RDW 16.4 11.5 - 17.0 %    Platelet 305 130 - 400 x10(3)/mcL    MPV 9.0 7.4 - 10.4 fL    Neut % 80.3 %    Lymph % 9.1 %    Mono % 7.1 %    Eos % 2.7 %    Basophil % 0.4 %    Lymph # 1.53 0.6 - 4.6 x10(3)/mcL    Neut # 13.6 (H) 2.1 - 9.2 x10(3)/mcL    Mono # 1.20 0.1 - 1.3 x10(3)/mcL    Eos # 0.46 0 - 0.9 x10(3)/mcL    Baso # 0.06 0 - 0.2 x10(3)/mcL   Magnesium    Collection Time: 11/13/22  4:18 AM   Result Value Ref Range    Magnesium Level 1.80 1.60 - 2.60 " mg/dL   Hemoglobin and Hematocrit    Collection Time: 11/13/22  9:33 AM   Result Value Ref Range    Hgb 8.5 (L) 14.0 - 18.0 gm/dL    Hct 26.2 (L) 42.0 - 52.0 %   CBC with Differential    Collection Time: 11/14/22  5:22 AM   Result Value Ref Range    WBC 21.6 (H) 4.5 - 11.5 x10(3)/mcL    RBC 2.46 (L) 4.70 - 6.10 x10(6)/mcL    Hgb 7.9 (L) 14.0 - 18.0 gm/dL    Hct 24.2 (L) 42.0 - 52.0 %    MCV 98.4 (H) 80.0 - 94.0 fL    MCH 32.1 (H) 27.0 - 31.0 pg    MCHC 32.6 (L) 33.0 - 36.0 mg/dL    RDW 16.0 11.5 - 17.0 %    Platelet 324 130 - 400 x10(3)/mcL    MPV 8.1 7.4 - 10.4 fL    Neut % 79.8 %    Lymph % 9.8 %    Mono % 6.5 %    Eos % 2.8 %    Basophil % 0.5 %    Lymph # 2.12 0.6 - 4.6 x10(3)/mcL    Neut # 17.2 (H) 2.1 - 9.2 x10(3)/mcL    Mono # 1.41 (H) 0.1 - 1.3 x10(3)/mcL    Eos # 0.61 0 - 0.9 x10(3)/mcL    Baso # 0.11 0 - 0.2 x10(3)/mcL    IG# 0.14 (H) 0 - 0.04 x10(3)/mcL    IG% 0.6 %    NRBC% 0.0 %   Magnesium    Collection Time: 11/14/22  5:22 AM   Result Value Ref Range    Magnesium Level 2.00 1.60 - 2.60 mg/dL   Basic Metabolic Panel    Collection Time: 11/14/22  5:22 AM   Result Value Ref Range    Sodium Level 137 136 - 145 mmol/L    Potassium Level 4.6 3.5 - 5.1 mmol/L    Chloride 104 98 - 107 mmol/L    Carbon Dioxide 26 23 - 31 mmol/L    Glucose Level 111 82 - 115 mg/dL    Blood Urea Nitrogen 15.7 8.4 - 25.7 mg/dL    Creatinine 1.07 0.73 - 1.18 mg/dL    BUN/Creatinine Ratio 15     Calcium Level Total 8.7 (L) 8.8 - 10.0 mg/dL    Anion Gap 7.0 mEq/L    eGFR >60 mls/min/1.73/m2   AFP Tumor Marker    Collection Time: 11/14/22  5:22 AM   Result Value Ref Range    Alpha Fetoprotein Level <2.00 <=8.90 ng/mL   Cancer Antigen 19-9    Collection Time: 11/14/22  5:22 AM   Result Value Ref Range    CA 19-9 867.48 (H) 0.00 - 37.00 unit/mL   CEA (in-house)    Collection Time: 11/14/22  5:22 AM   Result Value Ref Range    Carcinoembryonic Antigen 3.74 (H) 0.00 - 3.00 ng/mL         Assessment/Plan:  EGD today revealed extrinsic  compression posteriorly on the body from the mass which probably arising from the pancreas.  Elevated CA 19 9 and slightly elevated CEA.  We will recommend a biopsy by Interventional Radiology of the liver lesion to the existence stage IV metastatic disease.       SOULEYMANE Nunn PA-C

## 2022-11-14 NOTE — PROVATION PATIENT INSTRUCTIONS
Discharge Summary/Instructions after an Endoscopic Procedure  Patient Name: Bari Vallecillo  Patient MRN: [id number]  Patient YOB: 1940 Monday, November 14, 2022  ALEX Nunn MD  Dear patient,  As a result of recent federal legislation (The Federal Cures Act), you may   receive lab or pathology results from your procedure in your [facility]   account before your physician is able to contact you. Your physician or   their representative will relay the results to you with their   recommendations at their soonest availability.  Thank you,  RESTRICTIONS:  During your procedure today, you received medications for sedation.  These   medications may affect your judgment, balance and coordination.  Therefore,   for 24 hours, you have the following restrictions:   - DO NOT drive a car, operate machinery, make legal/financial decisions,   sign important papers or drink alcohol.    ACTIVITY:  Today: no heavy lifting, straining or running due to procedural   sedation/anesthesia.  The following day: return to full activity including work.  DIET:  Eat and drink normally unless instructed otherwise.     TREATMENT FOR COMMON SIDE EFFECTS:  - Mild abdominal pain, nausea, belching, bloating or excessive gas:  rest,   eat lightly and use a heating pad.  - Sore Throat: treat with throat lozenges and/or gargle with warm salt   water.  - Because air was used during the procedure, expelling large amounts of air   from your rectum or belching is normal.  - If a bowel prep was taken, you may not have a bowel movement for 1-3 days.    This is normal.  SYMPTOMS TO WATCH FOR AND REPORT TO YOUR PHYSICIAN:  1. Abdominal pain or bloating, other than gas cramps.  2. Chest pain.  3. Back pain.  4. Signs of infection such as: chills or fever occurring within 24 hours   after the procedure.  5. Rectal bleeding, which would show as bright red, maroon, or black stools.   (A tablespoon of blood from the rectum is not serious,  especially if   hemorrhoids are present.)  6. Vomiting.  7. Weakness or dizziness.  GO DIRECTLY TO THE NEAREST EMERGENCY ROOM IF YOU HAVE ANY OF THE FOLLOWING:      Difficulty breathing              Chills and/or fever over 101 F   Persistent vomiting and/or vomiting blood   Severe abdominal pain   Severe chest pain   Black, tarry stools   Bleeding- more than one tablespoon   Any other symptom or condition that you feel may need urgent attention  Your doctor recommends these additional instructions:  If any biopsies were taken, your doctors clinic will contact you in 1 to 2   weeks with any results.  - Return patient to hospital [facility] for ongoing care.   - Resume regular diet.  For questions, problems or results please call your physician - ALEX Nunn MD at Work:  [phone].  [facility], EMERGENCY ROOM PHONE NUMBER: [phone]  IF A COMPLICATION OR EMERGENCY SITUATION ARISES AND YOU ARE UNABLE TO REACH   YOUR PHYSICIAN - GO DIRECTLY TO THE EMERGENCY ROOM.  MD ALEX Maza MD  11/14/2022 3:53:49 PM  This report has been verified and signed electronically.  Dear patient,  As a result of recent federal legislation (The Federal Cures Act), you may   receive lab or pathology results from your procedure in your [facility]   account before your physician is able to contact you. Your physician or   their representative will relay the results to you with their   recommendations at their soonest availability.  Thank you,  PROVATION

## 2022-11-14 NOTE — CONSULTS
Consults    Chief complaint: ABD PAIN, LEUKOCYSTOSIS    HPI: 82 YEAR OLD MALE ADMITTED WITH ABD PAIN FOR GREATER THAN ONE MONTH; HE HAS HAD WORK UP OUTPATIENT INCLUDING ENDOSCOPY AND TELLS ME THEY COULD NEVER FIND OUT WHAT WAS WRONG; HE REPORTS DARK STOOLS NOW PRIOR TO ADMISSION WITH ANEMIA AND ELEVATED WBC; WORK UP INCLUDED CT SCAN WHICH SHOWED VERY LARGE LUQ MASS INVOLVING TAIL OF PANCREAS, STOMACH, AND SPLEEN; ALSO NOTED WAS LIVER LESIONS SUSPICIOUS FOR MALIGNANCY AS WELL AS LOWER LUNG NODULES ALSO SUSP FOR DISEASE  CA 19-9 ELEVATED     GI EVALUATED PT; EGD DONE TODAY; ;FINDINGS REVEALED NORMAL EGD ENDOSCOPICALLY HOWEVER EXTRINSIC MASS NOTED POSTERIORLY COMPRESSING BODY OF THE STOMACH, POSSIBLE PANCREATIC ORIGIN    Past Medical and Surgical History    Allergies :  Patient has no known allergies.  @[facility]@    Medical :  He has a past medical history of Coronary artery disease and Sleep apnea.    Surgical :  He has a past surgical history that includes Joint replacement.    Family History  His family history is not on file.    Social History  He reports that he has never smoked. He has never used smokeless tobacco. He reports that he does not currently use alcohol.    Review of Systems   Constitutional: Negative.    HENT: Negative.     Eyes: Negative.    Respiratory: Negative.     Cardiovascular: Negative.    Gastrointestinal:  Positive for abdominal pain.   Endocrine: Negative.    Genitourinary: Negative.    Musculoskeletal: Negative.    Skin: Negative.    Allergic/Immunologic: Negative.    Neurological: Negative.    Hematological: Negative.    Psychiatric/Behavioral: Negative.         Objective     Physical Exam  HENT:      Mouth/Throat:      Mouth: Mucous membranes are moist.   Cardiovascular:      Rate and Rhythm: Normal rate and regular rhythm.      Pulses: Normal pulses.      Heart sounds: Normal heart sounds.   Pulmonary:      Effort: Pulmonary effort is normal.      Breath sounds: Normal breath  sounds.   Abdominal:      General: There is no distension.      Palpations: Abdomen is soft.      Tenderness: There is no abdominal tenderness.   Musculoskeletal:         General: Normal range of motion.      Cervical back: Normal range of motion.   Skin:     General: Skin is warm and dry.   Neurological:      General: No focal deficit present.      Mental Status: He is alert.   Psychiatric:         Mood and Affect: Mood normal.         Behavior: Behavior normal.         Thought Content: Thought content normal.         Judgment: Judgment normal.       @Formerly named [facility]@    Assessment & Plan     Active Hospital Problems    Diagnosis  POA    *Abdominal mass [R19.00]  Yes    Upper GI bleeding [K92.2]  Unknown    Symptomatic anemia [D64.9]  Unknown      Resolved Hospital Problems   No resolved problems to display.       IMPRESSION: ABD MASS, CT FINDINGS CONCERNING FOR MALIGNANCY WITH METASTATIC DISEASE    PLAN: DR TRACEY TO REVIEW AND MAKE RECS

## 2022-11-14 NOTE — PROGRESS NOTES
Procedure results reported to floor nurse. Informed of medications given, IVF administered, vital signs and patient's stability. Nurse verbalized understanding and denied any questions at this time.

## 2022-11-14 NOTE — PROGRESS NOTES
[facility] Hospital Medicine Progress Note        Chief Complaint: Inpatient Follow-up for Abnormal labs    HPI:   Patient is 82-year-old pleasant male with history of CAD/stents 5 years ago who was referred to the ER for persistent abdominal pain and increasing leukocytosis on outpatient laboratory work.  Patient explains in September 2022 he underwent both an EGD as well as a colonoscopy as part of workup for his abdominal pain and both of these were completely unremarkable.  Over the course of the last week he developed dark colored stools.  He denied any fever, chills, or other symptoms during this time.  His PCP obtain laboratory work today that showed anemia leukocytosis and he was referred here for further evaluation.  He arrived afebrile hemodynamically stable.  Laboratory work confirmed anemia and leukocytosis of 25 K but no other systemic signs of infection.  A CT of his abdomen was obtained and showed an aggressive appearing mass involving the pancreas/stomach.  Liver metastases were also noted.  Admitted to . Gi was consulted and he received blood transfusion.       Interval Hx:   Afebrile.  Hemodynamically stable.  Comfortably resting.  Denies any bowel movements yesterday.  Hemoglobin stable.  Currently NPO for EGD today.    CEA and CA 19-9 elevated.  AFP was within normal limits    Objective/physical exam:  Vitals:    11/13/22 1500 11/13/22 1949 11/14/22 0006 11/14/22 0339   BP: 123/63 114/60 118/62 120/64   BP Location: Right arm      Patient Position: Lying      Pulse: 64 64 62 (!) 57   Resp: 18 17     Temp: 98.4 °F (36.9 °C) 98.9 °F (37.2 °C)  98.1 °F (36.7 °C)   TempSrc:  Oral  Oral   SpO2: 95% (!) 94% 96% 96%   Weight:       Height:         General: In no acute distress, afebrile  Respiratory: Clear to auscultation bilaterally  Cardiovascular: S1, S2, no appreciable murmur  Abdomen: Soft, nontender, BS +  MSK: Warm, no lower extremity edema, no clubbing or  cyanosis  Neurologic: Alert and oriented x4, moving all extremities with good strength     Lab Results   Component Value Date     11/14/2022    K 4.6 11/14/2022    CO2 26 11/14/2022    BUN 15.7 11/14/2022    CREATININE 1.07 11/14/2022    CALCIUM 8.7 (L) 11/14/2022      Lab Results   Component Value Date    ALT 8 11/13/2022    AST 17 11/13/2022    ALKPHOS 92 11/13/2022    BILITOT 0.5 11/13/2022      Lab Results   Component Value Date    WBC 21.6 (H) 11/14/2022    HGB 7.9 (L) 11/14/2022    HCT 24.2 (L) 11/14/2022    MCV 98.4 (H) 11/14/2022     11/14/2022        Medications:   allopurinoL  300 mg Oral Daily    amLODIPine  5 mg Oral Daily    atorvastatin  20 mg Oral Daily    lisinopriL  5 mg Oral Daily    pantoprazole  40 mg Intravenous Q12H    ranolazine  500 mg Oral BID      sodium chloride, melatonin, morphine, ondansetron, sodium chloride 0.9%     Assessment/Plan:    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  Monitor for now.  Continue Protonix.  GI following.  Needs EGD .  Continue to hold aspirin  -tumor markers elevated.  we will consult Surgical Oncology for more input.  -we will continue other medical management, home medications     SCDs      Koffi Kellogg MD

## 2022-11-14 NOTE — TRANSFER OF CARE
"Anesthesia Transfer of Care Note    Patient: Bari Vallecillo    Procedure(s) Performed: Procedure(s) (LRB):  EGD (ESOPHAGOGASTRODUODENOSCOPY) (N/A)    Patient location: GI    Anesthesia Type: MAC    Transport from OR: Transported from OR on room air with adequate spontaneous ventilation    Post pain: adequate analgesia    Post assessment: no apparent anesthetic complications    Post vital signs: stable    Level of consciousness: awake, alert and oriented    Nausea/Vomiting: no nausea/vomiting    Complications: none    Transfer of care protocol was followed      Last vitals:   Visit Vitals  /75   Pulse 69   Temp 36.6 °C (97.9 °F)   Resp 17   Ht 6' 3" (1.905 m)   Wt 105.8 kg (233 lb 4.8 oz)   SpO2 96%   BMI 29.16 kg/m²     "

## 2022-11-14 NOTE — INTERVAL H&P NOTE
The patient has been examined and the H&P has been reviewed:    I concur with the findings and no changes have occurred since H&P was written.    Procedure risks, benefits and alternative options discussed and understood by patient/family.          Active Hospital Problems    Diagnosis  POA    *Abdominal mass [R19.00]  Yes      Resolved Hospital Problems   No resolved problems to display.

## 2022-11-14 NOTE — ANESTHESIA POSTPROCEDURE EVALUATION
Anesthesia Post Evaluation    Patient: Bari Vallecillo    Procedure(s) Performed: Procedure(s) (LRB):  EGD (ESOPHAGOGASTRODUODENOSCOPY) (N/A)    Final Anesthesia Type: general      Patient location during evaluation: PACU  Patient participation: Yes- Able to Participate  Level of consciousness: awake and alert  Post-procedure vital signs: reviewed and stable  Pain management: adequate  Airway patency: patent      Anesthetic complications: no      Cardiovascular status: hemodynamically stable  Respiratory status: unassisted  Hydration status: euvolemic  Follow-up not needed.          Vitals Value Taken Time   /64 11/14/22 1540   Temp 36.7 11/14/22 1607   Pulse 60 11/14/22 1542   Resp 14 11/14/22 1542   SpO2 96 % 11/14/22 1542   Vitals shown include unvalidated device data.      No case tracking events are documented in the log.      Pain/Bijan Score: Bijan Score: 9 (11/14/2022  3:29 PM)

## 2022-11-14 NOTE — PROCEDURES
EGD Report    Referring Physician:  Hospitalist    Surgeon: SOULEYMANE Nunn    ASA:  3    Medications: Per anesthesia    Indication:  Abnormal CT scanning significant anemia and weight loss    Procedure: EGD     Description of the Procedure: The patient was brought back to the endoscopy suite where the risks, benefits, and alternatives of the procedure were described in detail. The patient was given the opportunity to ask questions and then signed informed consent. Patient was positioned in the left lateral decubitus position, continuous monitoring was initiated, and supplemental oxygen was provided via nasal cannula. Bite block was placed. Adequate sedation was achieved with the above mentioned medications as documented in chart and then titrated during the entire procedure. Under direct visualization the gastroscope was introduced through the oropharynx into the esophagus. The scope was advanced into the stomach and to the second portion of the duodenum. Scope was withdrawn and the mucosa was carefully examined. The entire gastric mucosa was examined, including the fundus with retroflexion. Air was evacuated from the stomach and the scope was withdrawn into the esophagus. The entire esophageal mucosa was examined. The procedure was completed. The patient tolerated the procedure well and was transferred to the recovery area in stable condition.     Estimated Blood Loss: minimal    Complications: none    Findings:  Normal-appearing upper mid and lower esophagus.  The intramucosal part of the body cardia fundus antrum pylorus duodenal bulb 1st 2nd and 3rd portions including the ampulla were visualized and unremarkable.  There was an extrinsic mass note posteriorly compressing the body of the stomach a bit.  No mucosal abnormalities however were noted    Impression and Recommendations:   Extrinsic mass stomach possibly of pancreatic origin (as per CT findings).    SOULEYMANE Nunn

## 2022-11-14 NOTE — ANESTHESIA PREPROCEDURE EVALUATION
11/14/2022  Bari Vallecillo is a 82 y.o., male with history of CAD/stents, HTN, sleep apnea uses cpap and other medical problems listed in the EMR admitted for workup of GI bleed referred by his cardiologist.      Pre-op Assessment    I have reviewed the Patient Summary Reports.     I have reviewed the Nursing Notes. I have reviewed the NPO Status.   I have reviewed the Medications.     Review of Systems      Physical Exam  General: Well nourished and Cooperative    Airway:  Mallampati: II   Mouth Opening: Normal  TM Distance: Normal  Tongue: Normal  Neck ROM: Normal ROM    Dental:  Intact    Chest/Lungs:  Clear to auscultation    Heart:  Rate: Normal        Anesthesia Plan  Type of Anesthesia, risks & benefits discussed:    Anesthesia Type: Gen Natural Airway  Intra-op Monitoring Plan: Standard ASA Monitors  Induction:  IV  Informed Consent: Informed consent signed with the Patient and all parties understand the risks and agree with anesthesia plan.  All questions answered.   ASA Score: 3  Day of Surgery Review of History & Physical: H&P Update referred to the surgeon/provider.  Anesthesia Plan Notes: Pt is followed by cardiologist, Dr. Jamie Jeffries For Surgery From Anesthesia Perspective.     .

## 2022-11-15 LAB
ABO + RH BLD: NORMAL
ANION GAP SERPL CALC-SCNC: 11 MEQ/L
BASOPHILS # BLD AUTO: 0.12 X10(3)/MCL (ref 0–0.2)
BASOPHILS NFR BLD AUTO: 0.5 %
BLD PROD TYP BPU: NORMAL
BLOOD UNIT EXPIRATION DATE: NORMAL
BLOOD UNIT TYPE CODE: 2800
BUN SERPL-MCNC: 19.1 MG/DL (ref 8.4–25.7)
CALCIUM SERPL-MCNC: 8.8 MG/DL (ref 8.8–10)
CHLORIDE SERPL-SCNC: 103 MMOL/L (ref 98–107)
CO2 SERPL-SCNC: 22 MMOL/L (ref 23–31)
CREAT SERPL-MCNC: 1.11 MG/DL (ref 0.73–1.18)
CREAT/UREA NIT SERPL: 17
CROSSMATCH INTERPRETATION: NORMAL
DISPENSE STATUS: NORMAL
EOSINOPHIL # BLD AUTO: 0.57 X10(3)/MCL (ref 0–0.9)
EOSINOPHIL NFR BLD AUTO: 2.2 %
ERYTHROCYTE [DISTWIDTH] IN BLOOD BY AUTOMATED COUNT: 15.7 % (ref 11.5–17)
GFR SERPLBLD CREATININE-BSD FMLA CKD-EPI: >60 MLS/MIN/1.73/M2
GLUCOSE SERPL-MCNC: 102 MG/DL (ref 82–115)
GROUP & RH: NORMAL
HCT VFR BLD AUTO: 24.5 % (ref 42–52)
HGB BLD-MCNC: 7.9 GM/DL (ref 14–18)
IMM GRANULOCYTES # BLD AUTO: 0.25 X10(3)/MCL (ref 0–0.04)
IMM GRANULOCYTES NFR BLD AUTO: 1 %
INDIRECT COOMBS GEL: NORMAL
LYMPHOCYTES # BLD AUTO: 2.52 X10(3)/MCL (ref 0.6–4.6)
LYMPHOCYTES NFR BLD AUTO: 9.7 %
MAGNESIUM SERPL-MCNC: 1.89 MG/DL (ref 1.6–2.6)
MCH RBC QN AUTO: 32.2 PG (ref 27–31)
MCHC RBC AUTO-ENTMCNC: 32.2 MG/DL (ref 33–36)
MCV RBC AUTO: 100 FL (ref 80–94)
MONOCYTES # BLD AUTO: 1.61 X10(3)/MCL (ref 0.1–1.3)
MONOCYTES NFR BLD AUTO: 6.2 %
NEUTROPHILS # BLD AUTO: 21 X10(3)/MCL (ref 2.1–9.2)
NEUTROPHILS NFR BLD AUTO: 80.4 %
NRBC BLD AUTO-RTO: 0 %
PLATELET # BLD AUTO: 344 X10(3)/MCL (ref 130–400)
PMV BLD AUTO: 8.7 FL (ref 7.4–10.4)
POTASSIUM SERPL-SCNC: 4.2 MMOL/L (ref 3.5–5.1)
RBC # BLD AUTO: 2.45 X10(6)/MCL (ref 4.7–6.1)
SODIUM SERPL-SCNC: 136 MMOL/L (ref 136–145)
UNIT NUMBER: NORMAL
WBC # SPEC AUTO: 26 X10(3)/MCL (ref 4.5–11.5)

## 2022-11-15 PROCEDURE — 80048 BASIC METABOLIC PNL TOTAL CA: CPT | Performed by: INTERNAL MEDICINE

## 2022-11-15 PROCEDURE — P9016 RBC LEUKOCYTES REDUCED: HCPCS | Performed by: INTERNAL MEDICINE

## 2022-11-15 PROCEDURE — 25000003 PHARM REV CODE 250: Performed by: INTERNAL MEDICINE

## 2022-11-15 PROCEDURE — 36430 TRANSFUSION BLD/BLD COMPNT: CPT

## 2022-11-15 PROCEDURE — 21400001 HC TELEMETRY ROOM

## 2022-11-15 PROCEDURE — 25000003 PHARM REV CODE 250

## 2022-11-15 PROCEDURE — C9113 INJ PANTOPRAZOLE SODIUM, VIA: HCPCS | Performed by: INTERNAL MEDICINE

## 2022-11-15 PROCEDURE — 36415 COLL VENOUS BLD VENIPUNCTURE: CPT | Performed by: INTERNAL MEDICINE

## 2022-11-15 PROCEDURE — 63600175 PHARM REV CODE 636 W HCPCS: Performed by: INTERNAL MEDICINE

## 2022-11-15 PROCEDURE — 83735 ASSAY OF MAGNESIUM: CPT | Performed by: INTERNAL MEDICINE

## 2022-11-15 PROCEDURE — 86850 RBC ANTIBODY SCREEN: CPT | Performed by: INTERNAL MEDICINE

## 2022-11-15 PROCEDURE — 87040 BLOOD CULTURE FOR BACTERIA: CPT | Performed by: INTERNAL MEDICINE

## 2022-11-15 PROCEDURE — 85025 COMPLETE CBC W/AUTO DIFF WBC: CPT | Performed by: INTERNAL MEDICINE

## 2022-11-15 RX ORDER — SIMETHICONE 80 MG
1 TABLET,CHEWABLE ORAL 4 TIMES DAILY PRN
Status: DISCONTINUED | OUTPATIENT
Start: 2022-11-15 | End: 2022-11-17 | Stop reason: HOSPADM

## 2022-11-15 RX ORDER — SCOLOPAMINE TRANSDERMAL SYSTEM 1 MG/1
1 PATCH, EXTENDED RELEASE TRANSDERMAL
Status: DISCONTINUED | OUTPATIENT
Start: 2022-11-15 | End: 2022-11-17 | Stop reason: HOSPADM

## 2022-11-15 RX ORDER — HYDROCODONE BITARTRATE AND ACETAMINOPHEN 500; 5 MG/1; MG/1
TABLET ORAL
Status: DISCONTINUED | OUTPATIENT
Start: 2022-11-15 | End: 2022-11-17 | Stop reason: HOSPADM

## 2022-11-15 RX ADMIN — ATORVASTATIN CALCIUM 20 MG: 10 TABLET, FILM COATED ORAL at 08:11

## 2022-11-15 RX ADMIN — PANTOPRAZOLE SODIUM 40 MG: 40 INJECTION, POWDER, LYOPHILIZED, FOR SOLUTION INTRAVENOUS at 08:11

## 2022-11-15 RX ADMIN — AMLODIPINE BESYLATE 5 MG: 5 TABLET ORAL at 08:11

## 2022-11-15 RX ADMIN — RANOLAZINE 500 MG: 500 TABLET, EXTENDED RELEASE ORAL at 08:11

## 2022-11-15 RX ADMIN — ONDANSETRON 4 MG: 2 INJECTION INTRAMUSCULAR; INTRAVENOUS at 01:11

## 2022-11-15 RX ADMIN — PIPERACILLIN AND TAZOBACTAM 4.5 G: 4; .5 INJECTION, POWDER, LYOPHILIZED, FOR SOLUTION INTRAVENOUS; PARENTERAL at 09:11

## 2022-11-15 RX ADMIN — PANTOPRAZOLE SODIUM 40 MG: 40 INJECTION, POWDER, LYOPHILIZED, FOR SOLUTION INTRAVENOUS at 09:11

## 2022-11-15 RX ADMIN — PIPERACILLIN AND TAZOBACTAM 4.5 G: 4; .5 INJECTION, POWDER, LYOPHILIZED, FOR SOLUTION INTRAVENOUS; PARENTERAL at 12:11

## 2022-11-15 RX ADMIN — LISINOPRIL 5 MG: 5 TABLET ORAL at 08:11

## 2022-11-15 RX ADMIN — RANOLAZINE 500 MG: 500 TABLET, EXTENDED RELEASE ORAL at 09:11

## 2022-11-15 RX ADMIN — SCOPOLAMINE 1 PATCH: 1 PATCH TRANSDERMAL at 05:11

## 2022-11-15 RX ADMIN — ALLOPURINOL 300 MG: 300 TABLET ORAL at 08:11

## 2022-11-15 RX ADMIN — SIMETHICONE 80 MG: 80 TABLET, CHEWABLE ORAL at 12:11

## 2022-11-15 NOTE — PLAN OF CARE
11/15/22 1303   Discharge Assessment   Assessment Type Discharge Planning Assessment   Confirmed/corrected address, phone number and insurance Yes   Confirmed Demographics Correct on Facesheet   Source of Information patient;family   When was your last doctors appointment? 11/11/22   Communicated TELLY with patient/caregiver Date not available/Unable to determine   Reason For Admission anemia   Lives With alone   Do you expect to return to your current living situation? Yes   Do you have help at home or someone to help you manage your care at home? Yes   Who are your caregiver(s) and their phone number(s)? spouse Kristina   Prior to hospitilization cognitive status: Alert/Oriented   Current cognitive status: Alert/Oriented   Walking or Climbing Stairs Difficulty none   Dressing/Bathing Difficulty none   Home Accessibility stairs to enter home   Number of Stairs, Main Entrance five   Equipment Currently Used at Home CPAP   Readmission within 30 days? No   Patient currently being followed by outpatient case management? No   Do you currently have service(s) that help you manage your care at home? No   Do you take prescription medications? Yes   Do you have prescription coverage? Yes   Coverage Medicare   Do you have any problems affording any of your prescribed medications? No   Is the patient taking medications as prescribed? yes   Who is going to help you get home at discharge? wife   How do you get to doctors appointments? car, drives self   Are you on dialysis? No   Do you take coumadin? No   Discharge Plan A Home   Discharge Plan B Home   DME Needed Upon Discharge  none   Discharge Plan discussed with: Patient;Spouse/sig other   Discharge Barriers Identified None

## 2022-11-15 NOTE — PROGRESS NOTES
[facility] Hospital Medicine Progress Note        Chief Complaint: Inpatient Follow-up for Abnormal labs    HPI:   Patient is 82-year-old pleasant male with history of CAD/stents 5 years ago who was referred to the ER for persistent abdominal pain and increasing leukocytosis on outpatient laboratory work.  Patient explains in September 2022 he underwent both an EGD as well as a colonoscopy as part of workup for his abdominal pain and both of these were completely unremarkable.  Over the course of the last week he developed dark colored stools.  He denied any fever, chills, or other symptoms during this time.  His PCP obtain laboratory work today that showed anemia leukocytosis and he was referred here for further evaluation.  He arrived afebrile hemodynamically stable.  Laboratory work confirmed anemia and leukocytosis of 25 K but no other systemic signs of infection.  A CT of his abdomen was obtained and showed an aggressive appearing mass involving the pancreas/stomach.  Liver metastases were also noted.  Admitted to . Gi was consulted and he received blood transfusion. CEA and CA 19-9 elevated.  AFP was within normal limits.  Underwent EGD 11/14/2022 which revealed normal esophagus, normal mucosa of stomach.  An extrinsic mass was noted to be compressing the body of the stomach.  No biopsies were obtained.  Surgical oncology consulted for further input       Interval Hx:     Fever T-max 100.4° yesterday evening.  This morning he is afebrile and hemodynamically stable.  Reports nausea all night.  Denies any nausea this morning.    One labs revealing worsening of leukocytosis, hemoglobin 7.9.    Objective/physical exam:  Vitals:    11/14/22 1550 11/14/22 1952 11/15/22 0038 11/15/22 0550   BP: 128/63 (!) 164/69 127/66 125/64   BP Location:       Patient Position:       Pulse: (!) 59 81 69 67   Resp: 15 19 19 18   Temp:  (!) 100.4 °F (38 °C) 99.5 °F (37.5 °C) 98.3 °F (36.8 °C)    TempSrc:  Oral Oral Oral   SpO2: (!) 94% (!) 91% (!) 93% (!) 93%   Weight:       Height:         General: In no acute distress, afebrile  Respiratory: Clear to auscultation bilaterally  Cardiovascular: S1, S2, no appreciable murmur  Abdomen: Soft, nontender, BS +  MSK: Warm, no lower extremity edema, no clubbing or cyanosis  Neurologic: Alert and oriented x4, moving all extremities with good strength     Lab Results   Component Value Date     11/14/2022    K 4.6 11/14/2022    CO2 26 11/14/2022    BUN 15.7 11/14/2022    CREATININE 1.07 11/14/2022    CALCIUM 8.7 (L) 11/14/2022      Lab Results   Component Value Date    ALT 8 11/13/2022    AST 17 11/13/2022    ALKPHOS 92 11/13/2022    BILITOT 0.5 11/13/2022      Lab Results   Component Value Date    WBC 26.0 (H) 11/15/2022    HGB 7.9 (L) 11/15/2022    HCT 24.5 (L) 11/15/2022    .0 (H) 11/15/2022     11/15/2022        Medications:   allopurinoL  300 mg Oral Daily    amLODIPine  5 mg Oral Daily    atorvastatin  20 mg Oral Daily    lisinopriL  5 mg Oral Daily    pantoprazole  40 mg Intravenous Q12H    ranolazine  500 mg Oral BID      sodium chloride, melatonin, morphine, ondansetron, sodium chloride 0.9%     Assessment/Plan:    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    -hemoglobin less than 8.  Continue to monitor and transfuse for less than 7.5.  GI following.  Continue Protonix  -Surgical Oncology consulted for further input.  -we will continue other medical management, home medications     MERYs        Koffi Kellogg MD

## 2022-11-15 NOTE — PROGRESS NOTES
"Gastroenterology Progress Note    Subjective:  Patient sitting bedside. He continues to endorse nausea and flatus, denies any episodes of emesis or abdominal pain.   Discussed with the patient the need for liver biopsy with IR, however IR is unavailable for the remainder of the week so will need to be done outpatient, he voiced understanding in this.    Objective:    ROS:    Review of Systems   Constitutional:  Negative for chills and fever.   Respiratory:  Negative for sputum production, shortness of breath and wheezing.    Gastrointestinal:  Positive for nausea. Negative for abdominal pain, blood in stool, heartburn, melena and vomiting.   Skin:  Negative for itching and rash.   Neurological:  Negative for dizziness, seizures, loss of consciousness and weakness.   Psychiatric/Behavioral:  The patient is not nervous/anxious.        Vital Signs:  /68   Pulse 64   Temp 98.3 °F (36.8 °C) (Oral)   Resp 18   Ht 6' 3" (1.905 m)   Wt 105.8 kg (233 lb 4.8 oz)   SpO2 (!) 90%   BMI 29.16 kg/m²   Body mass index is 29.16 kg/m².    Physical Exam:    Physical Exam  Constitutional:       General: He is not in acute distress.     Appearance: Normal appearance. He is not ill-appearing.   HENT:      Nose: Nose normal.   Eyes:      General: No scleral icterus.     Extraocular Movements: Extraocular movements intact.      Conjunctiva/sclera: Conjunctivae normal.      Pupils: Pupils are equal, round, and reactive to light.   Cardiovascular:      Rate and Rhythm: Normal rate.   Pulmonary:      Effort: Pulmonary effort is normal. No respiratory distress.      Breath sounds: No wheezing.   Abdominal:      General: Abdomen is flat. Bowel sounds are normal. There is no distension.      Palpations: Abdomen is soft.      Tenderness: There is no abdominal tenderness. There is no guarding.   Musculoskeletal:      Cervical back: Normal range of motion.   Skin:     Coloration: Skin is not jaundiced or pale.   Neurological:      " General: No focal deficit present.      Mental Status: He is alert. Mental status is at baseline.   Psychiatric:         Mood and Affect: Mood normal.       Labs:  Recent Results (from the past 24 hour(s))   Basic Metabolic Panel    Collection Time: 11/15/22  3:53 AM   Result Value Ref Range    Sodium Level 136 136 - 145 mmol/L    Potassium Level 4.2 3.5 - 5.1 mmol/L    Chloride 103 98 - 107 mmol/L    Carbon Dioxide 22 (L) 23 - 31 mmol/L    Glucose Level 102 82 - 115 mg/dL    Blood Urea Nitrogen 19.1 8.4 - 25.7 mg/dL    Creatinine 1.11 0.73 - 1.18 mg/dL    BUN/Creatinine Ratio 17     Calcium Level Total 8.8 8.8 - 10.0 mg/dL    Anion Gap 11.0 mEq/L    eGFR >60 mls/min/1.73/m2   CBC with Differential    Collection Time: 11/15/22  3:53 AM   Result Value Ref Range    WBC 26.0 (H) 4.5 - 11.5 x10(3)/mcL    RBC 2.45 (L) 4.70 - 6.10 x10(6)/mcL    Hgb 7.9 (L) 14.0 - 18.0 gm/dL    Hct 24.5 (L) 42.0 - 52.0 %    .0 (H) 80.0 - 94.0 fL    MCH 32.2 (H) 27.0 - 31.0 pg    MCHC 32.2 (L) 33.0 - 36.0 mg/dL    RDW 15.7 11.5 - 17.0 %    Platelet 344 130 - 400 x10(3)/mcL    MPV 8.7 7.4 - 10.4 fL    Neut % 80.4 %    Lymph % 9.7 %    Mono % 6.2 %    Eos % 2.2 %    Basophil % 0.5 %    Lymph # 2.52 0.6 - 4.6 x10(3)/mcL    Neut # 21.0 (H) 2.1 - 9.2 x10(3)/mcL    Mono # 1.61 (H) 0.1 - 1.3 x10(3)/mcL    Eos # 0.57 0 - 0.9 x10(3)/mcL    Baso # 0.12 0 - 0.2 x10(3)/mcL    IG# 0.25 (H) 0 - 0.04 x10(3)/mcL    IG% 1.0 %    NRBC% 0.0 %   Magnesium    Collection Time: 11/15/22  3:53 AM   Result Value Ref Range    Magnesium Level 1.89 1.60 - 2.60 mg/dL         Assessment/Plan:  1. Symptomatic anemia    2. Dark stools    3. Upper GI bleeding    4. Metastatic carcinoma      82 year old male with Hx CAD/stents 5 years ago who presented with abdominal pain, leukocytosis, and dark stools. CT abdomen revealed an aggressive mass involving the pancreas/stomach with noted liver mets.     Pancreas/Stomach mass w/Liver Mets, on CT  -s/p EGD 11/14 with  normal-appearing upper mid and lower esophagus. Showing Extrinsic mass stomach possibly of pancreatic origin (as per CT findings)  - Surgical Oncology on board, Will follow up with Dr. Troy's recommendations   - Elevated CA 19-9 and slightly elevated CEA.   - Would recommend liver biopsy with IR, however IR is unavailable for the remainder of the week. Patient is otherwise stable and feeling well, so this is something that can be done outpatient.   - I spoke with IR and discussed the need for this biopsy to be done outpatient as soon as possible. Patient's case will be reviewed by Dr. Wallace on Monday and if/when approved, IR will contact our office in order to set up this outpatient procedure. Biopsy results once completed will also be sent to Dr. Loki Troy for review.     2. Anemia, stable   - Hgb 8.8-->7.6-->1u-->5.9-->8.5-->7.9-->7.9  - Continue to monitor and transfuse as needed     Karin Perales PA-C  Gastroenertology  [facility]

## 2022-11-16 LAB
ALBUMIN SERPL-MCNC: 2.7 GM/DL (ref 3.4–4.8)
ALBUMIN/GLOB SERPL: 0.9 RATIO (ref 1.1–2)
ALP SERPL-CCNC: 109 UNIT/L (ref 40–150)
ALT SERPL-CCNC: 9 UNIT/L (ref 0–55)
AST SERPL-CCNC: 23 UNIT/L (ref 5–34)
BACTERIA BLD CULT: NORMAL
BACTERIA BLD CULT: NORMAL
BASOPHILS # BLD AUTO: 0.1 X10(3)/MCL (ref 0–0.2)
BASOPHILS NFR BLD AUTO: 0.4 %
BILIRUBIN DIRECT+TOT PNL SERPL-MCNC: 1.1 MG/DL
BUN SERPL-MCNC: 19.3 MG/DL (ref 8.4–25.7)
CALCIUM SERPL-MCNC: 8.3 MG/DL (ref 8.8–10)
CHLORIDE SERPL-SCNC: 103 MMOL/L (ref 98–107)
CO2 SERPL-SCNC: 25 MMOL/L (ref 23–31)
CREAT SERPL-MCNC: 1.07 MG/DL (ref 0.73–1.18)
EOSINOPHIL # BLD AUTO: 0.62 X10(3)/MCL (ref 0–0.9)
EOSINOPHIL NFR BLD AUTO: 2.4 %
ERYTHROCYTE [DISTWIDTH] IN BLOOD BY AUTOMATED COUNT: 17 % (ref 11.5–17)
GFR SERPLBLD CREATININE-BSD FMLA CKD-EPI: >60 MLS/MIN/1.73/M2
GLOBULIN SER-MCNC: 3 GM/DL (ref 2.4–3.5)
GLUCOSE SERPL-MCNC: 106 MG/DL (ref 82–115)
HCT VFR BLD AUTO: 25.6 % (ref 42–52)
HGB BLD-MCNC: 8.7 GM/DL (ref 14–18)
IMM GRANULOCYTES # BLD AUTO: 0.27 X10(3)/MCL (ref 0–0.04)
IMM GRANULOCYTES NFR BLD AUTO: 1 %
LYMPHOCYTES # BLD AUTO: 2.27 X10(3)/MCL (ref 0.6–4.6)
LYMPHOCYTES NFR BLD AUTO: 8.7 %
MAGNESIUM SERPL-MCNC: 1.9 MG/DL (ref 1.6–2.6)
MCH RBC QN AUTO: 32.7 PG (ref 27–31)
MCHC RBC AUTO-ENTMCNC: 34 MG/DL (ref 33–36)
MCV RBC AUTO: 96.2 FL (ref 80–94)
MONOCYTES # BLD AUTO: 1.45 X10(3)/MCL (ref 0.1–1.3)
MONOCYTES NFR BLD AUTO: 5.5 %
NEUTROPHILS # BLD AUTO: 21.4 X10(3)/MCL (ref 2.1–9.2)
NEUTROPHILS NFR BLD AUTO: 82 %
NRBC BLD AUTO-RTO: 0 %
PLATELET # BLD AUTO: 324 X10(3)/MCL (ref 130–400)
PMV BLD AUTO: 8.6 FL (ref 7.4–10.4)
POTASSIUM SERPL-SCNC: 4.1 MMOL/L (ref 3.5–5.1)
PROT SERPL-MCNC: 5.7 GM/DL (ref 5.8–7.6)
RBC # BLD AUTO: 2.66 X10(6)/MCL (ref 4.7–6.1)
SODIUM SERPL-SCNC: 136 MMOL/L (ref 136–145)
WBC # SPEC AUTO: 26.1 X10(3)/MCL (ref 4.5–11.5)

## 2022-11-16 PROCEDURE — 80053 COMPREHEN METABOLIC PANEL: CPT | Performed by: INTERNAL MEDICINE

## 2022-11-16 PROCEDURE — 25000003 PHARM REV CODE 250: Performed by: INTERNAL MEDICINE

## 2022-11-16 PROCEDURE — 85025 COMPLETE CBC W/AUTO DIFF WBC: CPT | Performed by: INTERNAL MEDICINE

## 2022-11-16 PROCEDURE — 25500020 PHARM REV CODE 255: Performed by: INTERNAL MEDICINE

## 2022-11-16 PROCEDURE — 83735 ASSAY OF MAGNESIUM: CPT | Performed by: INTERNAL MEDICINE

## 2022-11-16 PROCEDURE — 36415 COLL VENOUS BLD VENIPUNCTURE: CPT | Performed by: INTERNAL MEDICINE

## 2022-11-16 PROCEDURE — 25000003 PHARM REV CODE 250

## 2022-11-16 PROCEDURE — 63600175 PHARM REV CODE 636 W HCPCS: Performed by: INTERNAL MEDICINE

## 2022-11-16 PROCEDURE — 21400001 HC TELEMETRY ROOM

## 2022-11-16 PROCEDURE — C9113 INJ PANTOPRAZOLE SODIUM, VIA: HCPCS | Performed by: INTERNAL MEDICINE

## 2022-11-16 RX ADMIN — LISINOPRIL 5 MG: 5 TABLET ORAL at 09:11

## 2022-11-16 RX ADMIN — AMLODIPINE BESYLATE 5 MG: 5 TABLET ORAL at 09:11

## 2022-11-16 RX ADMIN — PIPERACILLIN AND TAZOBACTAM 4.5 G: 4; .5 INJECTION, POWDER, LYOPHILIZED, FOR SOLUTION INTRAVENOUS; PARENTERAL at 04:11

## 2022-11-16 RX ADMIN — ATORVASTATIN CALCIUM 20 MG: 10 TABLET, FILM COATED ORAL at 09:11

## 2022-11-16 RX ADMIN — RANOLAZINE 500 MG: 500 TABLET, EXTENDED RELEASE ORAL at 08:11

## 2022-11-16 RX ADMIN — Medication 6 MG: at 08:11

## 2022-11-16 RX ADMIN — SIMETHICONE 80 MG: 80 TABLET, CHEWABLE ORAL at 03:11

## 2022-11-16 RX ADMIN — IOPAMIDOL 100 ML: 755 INJECTION, SOLUTION INTRAVENOUS at 08:11

## 2022-11-16 RX ADMIN — PANTOPRAZOLE SODIUM 40 MG: 40 INJECTION, POWDER, LYOPHILIZED, FOR SOLUTION INTRAVENOUS at 08:11

## 2022-11-16 RX ADMIN — PIPERACILLIN AND TAZOBACTAM 4.5 G: 4; .5 INJECTION, POWDER, LYOPHILIZED, FOR SOLUTION INTRAVENOUS; PARENTERAL at 08:11

## 2022-11-16 RX ADMIN — RANOLAZINE 500 MG: 500 TABLET, EXTENDED RELEASE ORAL at 09:11

## 2022-11-16 RX ADMIN — PIPERACILLIN AND TAZOBACTAM 4.5 G: 4; .5 INJECTION, POWDER, LYOPHILIZED, FOR SOLUTION INTRAVENOUS; PARENTERAL at 11:11

## 2022-11-16 RX ADMIN — ALLOPURINOL 300 MG: 300 TABLET ORAL at 09:11

## 2022-11-16 RX ADMIN — PANTOPRAZOLE SODIUM 40 MG: 40 INJECTION, POWDER, LYOPHILIZED, FOR SOLUTION INTRAVENOUS at 09:11

## 2022-11-16 NOTE — PROGRESS NOTES
[facility] Hospital Medicine Progress Note        Chief Complaint: Inpatient Follow-up for Abnormal labs    HPI:   Patient is 82-year-old pleasant male with history of CAD/stents 5 years ago who was referred to the ER for persistent abdominal pain and increasing leukocytosis on outpatient laboratory work.  Patient explains in September 2022 he underwent both an EGD as well as a colonoscopy as part of workup for his abdominal pain and both of these were completely unremarkable.  Over the course of the last week he developed dark colored stools.  He denied any fever, chills, or other symptoms during this time.  His PCP obtain laboratory work today that showed anemia leukocytosis and he was referred here for further evaluation.  He arrived afebrile hemodynamically stable.  Laboratory work confirmed anemia and leukocytosis of 25 K but no other systemic signs of infection.  A CT of his abdomen was obtained and showed an aggressive appearing mass involving the pancreas/stomach.  Liver metastases were also noted.  Admitted to . Gi was consulted and he received blood transfusion. CEA and CA 19-9 elevated.  AFP was within normal limits.  Underwent EGD 11/14/2022 which revealed normal esophagus, normal mucosa of stomach.  An extrinsic mass was noted to be compressing the body of the stomach.  No biopsies were obtained.  Surgical oncology consulted for further input       Interval Hx:     Afebrile this morning.  T-max 99.6° last 24 hours.  He was alert, comfortably resting in the bed.  Reports intermittent nausea, 1 dark bowel movement yesterday.  Tolerating p.o. diet.  Family was at bedside this morning.      labs revealing persistent leukocytosis, hemoglobin 8.7.  He received 1 unit of PRBC yesterday.      Objective/physical exam:  Vitals:    11/16/22 0005 11/16/22 0436 11/16/22 0700 11/16/22 0904   BP: 130/66 126/69 121/65 121/65   Pulse: 65 66 74    Resp: 18 18 18    Temp: 98.6 °F (37 °C)  98.1 °F (36.7 °C) 98.4 °F (36.9 °C)    TempSrc: Oral Oral Oral    SpO2: 95% (!) 94% 95%    Weight:       Height:         General: In no acute distress, afebrile  Respiratory: Clear to auscultation bilaterally  Cardiovascular: S1, S2, no appreciable murmur  Abdomen: Soft, nontender, BS +  MSK: Warm, no lower extremity edema, no clubbing or cyanosis  Neurologic: Alert and oriented x4, moving all extremities with good strength     Lab Results   Component Value Date     11/16/2022    K 4.1 11/16/2022    CO2 25 11/16/2022    BUN 19.3 11/16/2022    CREATININE 1.07 11/16/2022    CALCIUM 8.3 (L) 11/16/2022      Lab Results   Component Value Date    ALT 9 11/16/2022    AST 23 11/16/2022    ALKPHOS 109 11/16/2022    BILITOT 1.1 11/16/2022      Lab Results   Component Value Date    WBC 26.1 (H) 11/16/2022    HGB 8.7 (L) 11/16/2022    HCT 25.6 (L) 11/16/2022    MCV 96.2 (H) 11/16/2022     11/16/2022        Medications:   allopurinoL  300 mg Oral Daily    amLODIPine  5 mg Oral Daily    atorvastatin  20 mg Oral Daily    lisinopriL  5 mg Oral Daily    pantoprazole  40 mg Intravenous Q12H    piperacillin-tazobactam (ZOSYN) IVPB  4.5 g Intravenous Q8H    ranolazine  500 mg Oral BID    scopolamine  1 patch Transdermal Q3 Days      sodium chloride, sodium chloride, melatonin, morphine, ondansetron, simethicone, sodium chloride 0.9%     Assessment/Plan:    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  -currently on empiric antibiotics.  Follow up cultures and monitor for now  -hemoglobin stable.  Continue Protonix.  GI following  -Surgical Oncology consulted for further input.  -we will continue other medical management, home medications     Johan MONTGOMERY  MD [place]

## 2022-11-16 NOTE — PROGRESS NOTES
"Gastroenterology Progress Note    Subjective: Patient was sitting up in bed, showing no signs of acute distress. His diet was advanced yesterday and he has been tolerating PO without any issues. Continues to endorse nausea, but no vomiting.   He had a BM yesterday, dark brown "almost black" in color, unchanged since admission and something he noticed prior to his recent EGD on 11/14 that showed no signs of acute GI bleed.    No evidence of hematochezia with stable H&H.     Objective:    ROS:    Review of Systems   Constitutional:  Negative for chills and fever.   Respiratory:  Negative for sputum production, shortness of breath and wheezing.    Gastrointestinal:  Positive for melena and nausea. Negative for abdominal pain, blood in stool, heartburn and vomiting.   Skin:  Negative for itching and rash.   Neurological:  Negative for dizziness, seizures, loss of consciousness and weakness.   Psychiatric/Behavioral:  The patient is not nervous/anxious.        Vital Signs:  /65   Pulse 74   Temp 98.4 °F (36.9 °C) (Oral)   Resp 18   Ht 6' 3" (1.905 m)   Wt 105.8 kg (233 lb 4.8 oz)   SpO2 95%   BMI 29.16 kg/m²   Body mass index is 29.16 kg/m².    Physical Exam:    Physical Exam  Constitutional:       General: He is not in acute distress.     Appearance: Normal appearance. He is not ill-appearing.   HENT:      Nose: Nose normal.   Eyes:      General: No scleral icterus.     Extraocular Movements: Extraocular movements intact.      Conjunctiva/sclera: Conjunctivae normal.      Pupils: Pupils are equal, round, and reactive to light.   Cardiovascular:      Rate and Rhythm: Normal rate.   Pulmonary:      Effort: Pulmonary effort is normal. No respiratory distress.      Breath sounds: No wheezing.   Abdominal:      General: Abdomen is flat. Bowel sounds are normal. There is no distension.      Palpations: Abdomen is soft.      Tenderness: There is no abdominal tenderness. There is no guarding.   Musculoskeletal:     "  Cervical back: Normal range of motion.   Skin:     Coloration: Skin is not jaundiced or pale.   Neurological:      General: No focal deficit present.      Mental Status: He is alert. Mental status is at baseline.   Psychiatric:         Mood and Affect: Mood normal.       Labs:  Recent Results (from the past 24 hour(s))   Type & Screen    Collection Time: 11/15/22  4:07 PM   Result Value Ref Range    Group & Rh AB NEG     Indirect Amirah GEL NEG    CBC with Differential    Collection Time: 11/16/22  3:37 AM   Result Value Ref Range    WBC 26.1 (H) 4.5 - 11.5 x10(3)/mcL    RBC 2.66 (L) 4.70 - 6.10 x10(6)/mcL    Hgb 8.7 (L) 14.0 - 18.0 gm/dL    Hct 25.6 (L) 42.0 - 52.0 %    MCV 96.2 (H) 80.0 - 94.0 fL    MCH 32.7 (H) 27.0 - 31.0 pg    MCHC 34.0 33.0 - 36.0 mg/dL    RDW 17.0 11.5 - 17.0 %    Platelet 324 130 - 400 x10(3)/mcL    MPV 8.6 7.4 - 10.4 fL    Neut % 82.0 %    Lymph % 8.7 %    Mono % 5.5 %    Eos % 2.4 %    Basophil % 0.4 %    Lymph # 2.27 0.6 - 4.6 x10(3)/mcL    Neut # 21.4 (H) 2.1 - 9.2 x10(3)/mcL    Mono # 1.45 (H) 0.1 - 1.3 x10(3)/mcL    Eos # 0.62 0 - 0.9 x10(3)/mcL    Baso # 0.10 0 - 0.2 x10(3)/mcL    IG# 0.27 (H) 0 - 0.04 x10(3)/mcL    IG% 1.0 %    NRBC% 0.0 %   Comprehensive Metabolic Panel    Collection Time: 11/16/22  3:37 AM   Result Value Ref Range    Sodium Level 136 136 - 145 mmol/L    Potassium Level 4.1 3.5 - 5.1 mmol/L    Chloride 103 98 - 107 mmol/L    Carbon Dioxide 25 23 - 31 mmol/L    Glucose Level 106 82 - 115 mg/dL    Blood Urea Nitrogen 19.3 8.4 - 25.7 mg/dL    Creatinine 1.07 0.73 - 1.18 mg/dL    Calcium Level Total 8.3 (L) 8.8 - 10.0 mg/dL    Protein Total 5.7 (L) 5.8 - 7.6 gm/dL    Albumin Level 2.7 (L) 3.4 - 4.8 gm/dL    Globulin 3.0 2.4 - 3.5 gm/dL    Albumin/Globulin Ratio 0.9 (L) 1.1 - 2.0 ratio    Bilirubin Total 1.1 <=1.5 mg/dL    Alkaline Phosphatase 109 40 - 150 unit/L    Alanine Aminotransferase 9 0 - 55 unit/L    Aspartate Aminotransferase 23 5 - 34 unit/L    eGFR >60  mls/min/1.73/m2   Magnesium    Collection Time: 11/16/22  3:37 AM   Result Value Ref Range    Magnesium Level 1.90 1.60 - 2.60 mg/dL         Assessment/Plan:  1. Symptomatic anemia    2. Dark stools    3. Upper GI bleeding    4. Metastatic carcinoma      82 year old male with Hx CAD/stents 5 years ago who presented with abdominal pain, leukocytosis, and dark stools. CT abdomen revealed an aggressive mass involving the pancreas/stomach with noted liver mets.     Pancreas/Stomach mass w/Liver Mets, on CT  -s/p EGD 11/14 with normal-appearing upper mid and lower esophagus. Showing Extrinsic mass stomach possibly of pancreatic origin (as per CT findings)  - Surgical Oncology on board, Will follow up with Dr. Troy's recommendations   - Elevated CA 19-9 and slightly elevated CEA.   - Would recommend liver biopsy with IR, however IR is unavailable for the remainder of the week. Patient is otherwise stable and feeling well, so this is something that can be done outpatient.   - I spoke with IR and discussed the need for this biopsy to be done outpatient as soon as possible. Patient's case will be reviewed by Dr. Wallace on Monday and if/when approved, IR will contact our office in order to set up this outpatient procedure. Biopsy results once completed will also be sent to Dr. Loki Troy for review.   - Have also attempted to contact [facility] regarding a possible outpatient liver biopsy and am waiting to hear back     2. Anemia, stable   - Hgb 8.8-->7.6-->1u-->5.9-->8.5-->7.9-->7.9-->8.7/25.6  - Continue to monitor and transfuse as needed     Karin Perales PA-C  Gastroenertology  [facility]

## 2022-11-17 VITALS
TEMPERATURE: 99 F | BODY MASS INDEX: 29.01 KG/M2 | HEART RATE: 72 BPM | WEIGHT: 233.31 LBS | SYSTOLIC BLOOD PRESSURE: 137 MMHG | HEIGHT: 75 IN | OXYGEN SATURATION: 95 % | DIASTOLIC BLOOD PRESSURE: 68 MMHG | RESPIRATION RATE: 18 BRPM

## 2022-11-17 LAB
ALBUMIN SERPL-MCNC: 2.6 GM/DL (ref 3.4–4.8)
ALBUMIN/GLOB SERPL: 0.7 RATIO (ref 1.1–2)
ALP SERPL-CCNC: 106 UNIT/L (ref 40–150)
ALT SERPL-CCNC: 11 UNIT/L (ref 0–55)
AST SERPL-CCNC: 22 UNIT/L (ref 5–34)
BASOPHILS # BLD AUTO: 0.11 X10(3)/MCL (ref 0–0.2)
BASOPHILS NFR BLD AUTO: 0.4 %
BILIRUBIN DIRECT+TOT PNL SERPL-MCNC: 0.7 MG/DL
BUN SERPL-MCNC: 12.8 MG/DL (ref 8.4–25.7)
CALCIUM SERPL-MCNC: 8.7 MG/DL (ref 8.8–10)
CHLORIDE SERPL-SCNC: 101 MMOL/L (ref 98–107)
CO2 SERPL-SCNC: 26 MMOL/L (ref 23–31)
CREAT SERPL-MCNC: 1.11 MG/DL (ref 0.73–1.18)
EOSINOPHIL # BLD AUTO: 0.64 X10(3)/MCL (ref 0–0.9)
EOSINOPHIL NFR BLD AUTO: 2.4 %
ERYTHROCYTE [DISTWIDTH] IN BLOOD BY AUTOMATED COUNT: 16.8 % (ref 11.5–17)
GFR SERPLBLD CREATININE-BSD FMLA CKD-EPI: >60 MLS/MIN/1.73/M2
GLOBULIN SER-MCNC: 3.5 GM/DL (ref 2.4–3.5)
GLUCOSE SERPL-MCNC: 114 MG/DL (ref 82–115)
HCT VFR BLD AUTO: 26.1 % (ref 42–52)
HEMATOLOGIST REVIEW: NORMAL
HGB BLD-MCNC: 8.6 GM/DL (ref 14–18)
IMM GRANULOCYTES # BLD AUTO: 0.24 X10(3)/MCL (ref 0–0.04)
IMM GRANULOCYTES NFR BLD AUTO: 0.9 %
LYMPHOCYTES # BLD AUTO: 2.43 X10(3)/MCL (ref 0.6–4.6)
LYMPHOCYTES NFR BLD AUTO: 9.1 %
MAGNESIUM SERPL-MCNC: 1.9 MG/DL (ref 1.6–2.6)
MCH RBC QN AUTO: 32.2 PG (ref 27–31)
MCHC RBC AUTO-ENTMCNC: 33 MG/DL (ref 33–36)
MCV RBC AUTO: 97.8 FL (ref 80–94)
MONOCYTES # BLD AUTO: 1.65 X10(3)/MCL (ref 0.1–1.3)
MONOCYTES NFR BLD AUTO: 6.2 %
NEUTROPHILS # BLD AUTO: 21.7 X10(3)/MCL (ref 2.1–9.2)
NEUTROPHILS NFR BLD AUTO: 81 %
NRBC BLD AUTO-RTO: 0 %
PLATELET # BLD AUTO: 328 X10(3)/MCL (ref 130–400)
PMV BLD AUTO: 8.4 FL (ref 7.4–10.4)
POTASSIUM SERPL-SCNC: 4.1 MMOL/L (ref 3.5–5.1)
PROT SERPL-MCNC: 6.1 GM/DL (ref 5.8–7.6)
RBC # BLD AUTO: 2.67 X10(6)/MCL (ref 4.7–6.1)
SODIUM SERPL-SCNC: 135 MMOL/L (ref 136–145)
WBC # SPEC AUTO: 26.8 X10(3)/MCL (ref 4.5–11.5)

## 2022-11-17 PROCEDURE — 36415 COLL VENOUS BLD VENIPUNCTURE: CPT | Performed by: INTERNAL MEDICINE

## 2022-11-17 PROCEDURE — 25000003 PHARM REV CODE 250: Performed by: INTERNAL MEDICINE

## 2022-11-17 PROCEDURE — 85060 BLOOD SMEAR INTERPRETATION: CPT | Performed by: INTERNAL MEDICINE

## 2022-11-17 PROCEDURE — 80053 COMPREHEN METABOLIC PANEL: CPT | Performed by: INTERNAL MEDICINE

## 2022-11-17 PROCEDURE — 85025 COMPLETE CBC W/AUTO DIFF WBC: CPT | Performed by: INTERNAL MEDICINE

## 2022-11-17 PROCEDURE — 63600175 PHARM REV CODE 636 W HCPCS: Performed by: INTERNAL MEDICINE

## 2022-11-17 PROCEDURE — C9113 INJ PANTOPRAZOLE SODIUM, VIA: HCPCS | Performed by: INTERNAL MEDICINE

## 2022-11-17 PROCEDURE — 83735 ASSAY OF MAGNESIUM: CPT | Performed by: INTERNAL MEDICINE

## 2022-11-17 RX ORDER — PANTOPRAZOLE SODIUM 40 MG/1
40 TABLET, DELAYED RELEASE ORAL DAILY
Qty: 30 TABLET | Refills: 11 | Status: SHIPPED | OUTPATIENT
Start: 2022-11-17 | End: 2023-11-17

## 2022-11-17 RX ADMIN — AMLODIPINE BESYLATE 5 MG: 5 TABLET ORAL at 08:11

## 2022-11-17 RX ADMIN — ATORVASTATIN CALCIUM 20 MG: 10 TABLET, FILM COATED ORAL at 08:11

## 2022-11-17 RX ADMIN — PIPERACILLIN AND TAZOBACTAM 4.5 G: 4; .5 INJECTION, POWDER, LYOPHILIZED, FOR SOLUTION INTRAVENOUS; PARENTERAL at 04:11

## 2022-11-17 RX ADMIN — RANOLAZINE 500 MG: 500 TABLET, EXTENDED RELEASE ORAL at 08:11

## 2022-11-17 RX ADMIN — LISINOPRIL 5 MG: 5 TABLET ORAL at 08:11

## 2022-11-17 RX ADMIN — ALLOPURINOL 300 MG: 300 TABLET ORAL at 08:11

## 2022-11-17 RX ADMIN — PANTOPRAZOLE SODIUM 40 MG: 40 INJECTION, POWDER, LYOPHILIZED, FOR SOLUTION INTRAVENOUS at 08:11

## 2022-11-17 NOTE — PROGRESS NOTES
"Gastroenterology Progress Note    Subjective: Patient resting in bed with no current complaints. He continues to be able to tolerate PO without any issues. He denies N/V, abdominal pain. Overall, patient seems to be feeling well, hopeful for discharge today with plans for outpatient liver biopsy.     Objective:    ROS:    Review of Systems   Constitutional:  Negative for chills and fever.   Respiratory:  Negative for sputum production, shortness of breath and wheezing.    Gastrointestinal:  Negative for abdominal pain, blood in stool, heartburn, melena, nausea and vomiting.   Skin:  Negative for itching and rash.   Neurological:  Negative for dizziness, seizures, loss of consciousness and weakness.   Psychiatric/Behavioral:  The patient is not nervous/anxious.        Vital Signs:  /68   Pulse 72   Temp 99.3 °F (37.4 °C) (Oral)   Resp 18   Ht 6' 3" (1.905 m)   Wt 105.8 kg (233 lb 4.8 oz)   SpO2 95%   BMI 29.16 kg/m²   Body mass index is 29.16 kg/m².    Physical Exam:    Physical Exam  Constitutional:       General: He is not in acute distress.     Appearance: Normal appearance. He is not ill-appearing.   HENT:      Nose: Nose normal.   Eyes:      General: No scleral icterus.     Extraocular Movements: Extraocular movements intact.      Conjunctiva/sclera: Conjunctivae normal.      Pupils: Pupils are equal, round, and reactive to light.   Cardiovascular:      Rate and Rhythm: Normal rate.   Pulmonary:      Effort: Pulmonary effort is normal. No respiratory distress.      Breath sounds: No wheezing.   Abdominal:      General: Abdomen is flat. Bowel sounds are normal. There is no distension.      Palpations: Abdomen is soft.      Tenderness: There is no abdominal tenderness. There is no guarding.   Musculoskeletal:      Cervical back: Normal range of motion.   Skin:     Coloration: Skin is not jaundiced or pale.   Neurological:      General: No focal deficit present.      Mental Status: He is alert. Mental " status is at baseline.   Psychiatric:         Mood and Affect: Mood normal.       Labs:  Recent Results (from the past 24 hour(s))   Comprehensive Metabolic Panel    Collection Time: 11/17/22  4:08 AM   Result Value Ref Range    Sodium Level 135 (L) 136 - 145 mmol/L    Potassium Level 4.1 3.5 - 5.1 mmol/L    Chloride 101 98 - 107 mmol/L    Carbon Dioxide 26 23 - 31 mmol/L    Glucose Level 114 82 - 115 mg/dL    Blood Urea Nitrogen 12.8 8.4 - 25.7 mg/dL    Creatinine 1.11 0.73 - 1.18 mg/dL    Calcium Level Total 8.7 (L) 8.8 - 10.0 mg/dL    Protein Total 6.1 5.8 - 7.6 gm/dL    Albumin Level 2.6 (L) 3.4 - 4.8 gm/dL    Globulin 3.5 2.4 - 3.5 gm/dL    Albumin/Globulin Ratio 0.7 (L) 1.1 - 2.0 ratio    Bilirubin Total 0.7 <=1.5 mg/dL    Alkaline Phosphatase 106 40 - 150 unit/L    Alanine Aminotransferase 11 0 - 55 unit/L    Aspartate Aminotransferase 22 5 - 34 unit/L    eGFR >60 mls/min/1.73/m2   CBC with Differential    Collection Time: 11/17/22  4:08 AM   Result Value Ref Range    WBC 26.8 (H) 4.5 - 11.5 x10(3)/mcL    RBC 2.67 (L) 4.70 - 6.10 x10(6)/mcL    Hgb 8.6 (L) 14.0 - 18.0 gm/dL    Hct 26.1 (L) 42.0 - 52.0 %    MCV 97.8 (H) 80.0 - 94.0 fL    MCH 32.2 (H) 27.0 - 31.0 pg    MCHC 33.0 33.0 - 36.0 mg/dL    RDW 16.8 11.5 - 17.0 %    Platelet 328 130 - 400 x10(3)/mcL    MPV 8.4 7.4 - 10.4 fL    Neut % 81.0 %    Lymph % 9.1 %    Mono % 6.2 %    Eos % 2.4 %    Basophil % 0.4 %    Lymph # 2.43 0.6 - 4.6 x10(3)/mcL    Neut # 21.7 (H) 2.1 - 9.2 x10(3)/mcL    Mono # 1.65 (H) 0.1 - 1.3 x10(3)/mcL    Eos # 0.64 0 - 0.9 x10(3)/mcL    Baso # 0.11 0 - 0.2 x10(3)/mcL    IG# 0.24 (H) 0 - 0.04 x10(3)/mcL    IG% 0.9 %    NRBC% 0.0 %   Magnesium    Collection Time: 11/17/22  4:08 AM   Result Value Ref Range    Magnesium Level 1.90 1.60 - 2.60 mg/dL         Assessment/Plan:  1. Symptomatic anemia    2. Dark stools    3. Upper GI bleeding    4. Metastatic carcinoma      82 year old male with Hx CAD/stents 5 years ago who presented with  abdominal pain, leukocytosis, and dark stools. CT abdomen revealed an aggressive mass involving the pancreas/stomach with noted liver mets.     Pancreas/Stomach mass w/Liver Mets, on CT  -s/p EGD 11/14 with normal-appearing upper mid and lower esophagus. Showing Extrinsic mass stomach possibly of pancreatic origin (as per CT findings)  - Surgical Oncology on board, Will follow up with Dr. Troy's recommendations   - Elevated CA 19-9 and slightly elevated CEA.   - Would recommend liver biopsy with IR, however IR is unavailable for the remainder of the week. I spoke with IR and discussed the need for this biopsy to be done outpatient as soon as possible. Patient's case will be reviewed by Dr. Wallace on Monday 11/21 and if/when approved, IR will contact our office in order to set up this outpatient procedure. Biopsy results once completed will also be sent to Dr. Loki Troy for review.   - Information has also been faxed over to [facility] for possible outpatient biopsy.   - Clear for discharge from a GI standpoint and clear to resume anticoagulation. Patient will be contacted regarding outpatient biopsy, either at [facility] or [facility], at the earliest available date.     2. Anemia, stable   - Hgb 8.8-->7.6-->1u-->5.9-->8.5-->7.9-->7.9-->8.7/25.6-->8.6/26.1      Karin Perales PA-C  Gastroenertology  [facility]

## 2022-11-17 NOTE — DISCHARGE SUMMARY
[facility]  [facility] Medicine  Discharge Summary      Patient Name: Bari Vallecillo  MRN: [id number]  [facility]: [id number]  Patient Class: IP- Inpatient  Admission Date: 11/11/2022  Hospital Length of Stay: 6 days  Discharge Date and Time:  11/17/2022 9:28 AM  Attending Physician: Luly att. providers found   Discharging Provider: Koffi Kellogg MD  Primary Care Provider: Primary Doctor Luly    Primary Care Team: Networked reference to record PCT       Patient is 82-year-old pleasant male with history of CAD/stents 5 years ago who was referred to the ER for persistent abdominal pain and increasing leukocytosis on outpatient laboratory work.  Patient explains in September 2022 he underwent both an EGD as well as a colonoscopy as part of workup for his abdominal pain and both of these were completely unremarkable.  Over the course of the last week he developed dark colored stools.  He denied any fever, chills, or other symptoms during this time.  His PCP obtain laboratory work today that showed anemia leukocytosis and he was referred here for further evaluation.  He arrived afebrile hemodynamically stable.  Laboratory work confirmed anemia and leukocytosis of 25 K but no other systemic signs of infection.  A CT of his abdomen was obtained and showed an aggressive appearing mass involving the pancreas/stomach.  Liver metastases were also noted.  Admitted to . Gi was consulted and he received blood transfusion. CEA and CA 19-9 elevated.  AFP was within normal limits.  Underwent EGD 11/14/2022 which revealed normal esophagus, normal mucosa of stomach.  An extrinsic mass was noted to be compressing the body of the stomach.  No biopsies were obtained.  Surgical oncology consulted for further input. GI recommended IR guided biopsy but IR was not available. He will dc to home today. He has leukocytosis while in the hospital and was on antibiotics for few days. All antibiotics dced on discharge.  GI will arange biopsy as outpatient. All meds reconciled. Antiplatelet agents resumed on dc.       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  Elevated tumor markers      HX:  CAD/stents, HTN       Procedure(s) (LRB):  EGD (ESOPHAGOGASTRODUODENOSCOPY) (N/A)            Goals of Care Treatment Preferences:  Code Status: Full Code    General: In no acute distress, afebrile  Respiratory: Clear to auscultation bilaterally  Cardiovascular: S1, S2, no appreciable murmur  Abdomen: Soft, nontender, BS +  MSK: Warm, no lower extremity edema, no clubbing or cyanosis  Neurologic: Alert and oriented x4, moving all extremities with good strength        Consults:   Consults (From admission, onward)          Status Ordering Provider     Inpatient consult to Interventional Radiology  Once        Provider:  Louis Alex MD    Acknowledged SOULEYMANE SETH     Inpatient consult to General Surgery  Once        Provider:  Loki Troy MD    Acknowledged ROSEMARY AMBROSIO     Inpatient consult to Gastroenterology  Once        Provider:  Rola Khan MD    Completed KATHY SIERRA            No new Assessment & Plan notes have been filed under this hospital service since the last note was generated.  Service: Hospital Medicine    Final Active Diagnoses:    Diagnosis Date Noted POA    PRINCIPAL PROBLEM:  Abdominal mass [R19.00] 11/11/2022 Yes    Upper GI bleeding [K92.2] 11/14/2022 Yes    Symptomatic anemia [D64.9] 11/14/2022 Yes      Problems Resolved During this Admission:       Discharged Condition: good    Disposition: Home or Self Care    Follow Up:   Follow-up Information       Primary Doctor No Follow up in 1 week(s).                           Patient Instructions:   No discharge procedures on file.    Significant Diagnostic Studies: Labs: CBC   Recent Labs   Lab 11/16/22  0337 11/17/22  0408   WBC 26.1*  26.8*   HGB 8.7* 8.6*   HCT 25.6* 26.1*    328       Pending Diagnostic Studies:       Procedure Component Value Units Date/Time    OCCULT BLOOD STOOL, CA SCREEN 2ND SPEC [[id number]]     Order Status: Sent Lab Status: No result     Specimen: Stool     Occult Blood, Stool Screening (1 -3) [[id number]]  (Abnormal) Collected: 11/12/22 0719    Order Status: Sent Lab Status: In process Updated: 11/12/22 1150    Specimen: Stool     Narrative:      The following orders were created for panel order Occult Blood, Stool Screening (1 -3).  Procedure                               Abnormality         Status                     ---------                               -----------         ------                     Occult Blood Stool, CA S...[[id number]]  Abnormal            Final result               OCCULT BLOOD STOOL, CA S...[[id number]]                                                   Please view results for these tests on the individual orders.           Medications:  Reconciled Home Medications:      Medication List        START taking these medications      pantoprazole 40 MG tablet  Commonly known as: PROTONIX  Take 1 tablet (40 mg total) by mouth once daily.            CONTINUE taking these medications      allopurinoL 300 MG tablet  Commonly known as: ZYLOPRIM  Take 300 mg by mouth once daily.     amLODIPine 5 MG tablet  Commonly known as: NORVASC  Take 5 mg by mouth once daily.     aspirin 81 MG EC tablet  Commonly known as: ECOTRIN  Take 81 mg by mouth once daily.     BRILINTA 60 mg tablet  Generic drug: ticagrelor  Take 60 mg by mouth 2 (two) times daily.     ergocalciferol 50,000 unit Cap  Commonly known as: ERGOCALCIFEROL  Take 50,000 Units by mouth every Sunday.     GENTLE LAXATIVE (BISACODYL) 5 mg EC tablet  Generic drug: bisacodyL  Take by mouth once daily.     nitroGLYCERIN 0.4 MG SL tablet  Commonly known as: NITROSTAT  Place 0.4 mg under the tongue.     quinapriL 40 MG tablet  Commonly known as:  ACCUPRIL  Take 40 mg by mouth once daily.     ranolazine 500 MG Tb12  Commonly known as: RANEXA  Take 500 mg by mouth 2 (two) times daily.     rosuvastatin 5 MG tablet  Commonly known as: CRESTOR  Take 5 mg by mouth once daily.              Indwelling Lines/Drains at time of discharge:   Lines/Drains/Airways       None                   Time spent on the discharge of patient: 35 minutes         Koffi Kellogg MD  Department of Hospital Medicine  [facility] - 5th Floor Med Surg

## 2022-11-20 ENCOUNTER — HOSPITAL ENCOUNTER (INPATIENT)
Facility: HOSPITAL | Age: 82
LOS: 4 days | Discharge: HOME | DRG: 436 | End: 2022-11-24
Attending: STUDENT IN AN ORGANIZED HEALTH CARE EDUCATION/TRAINING PROGRAM | Admitting: INTERNAL MEDICINE
Payer: MEDICARE

## 2022-11-20 DIAGNOSIS — R00.0 TACHYCARDIA: ICD-10-CM

## 2022-11-20 DIAGNOSIS — A41.9 SEPSIS: ICD-10-CM

## 2022-11-20 DIAGNOSIS — D64.9 ANEMIA, UNSPECIFIED TYPE: ICD-10-CM

## 2022-11-20 DIAGNOSIS — A41.9 SEPSIS, DUE TO UNSPECIFIED ORGANISM, UNSPECIFIED WHETHER ACUTE ORGAN DYSFUNCTION PRESENT: ICD-10-CM

## 2022-11-20 DIAGNOSIS — D72.829 LEUKOCYTOSIS, UNSPECIFIED TYPE: ICD-10-CM

## 2022-11-20 DIAGNOSIS — R53.1 WEAKNESS: Primary | ICD-10-CM

## 2022-11-20 DIAGNOSIS — C79.9 METASTATIC MALIGNANT NEOPLASM, UNSPECIFIED SITE: ICD-10-CM

## 2022-11-20 DIAGNOSIS — R07.9 CHEST PAIN: ICD-10-CM

## 2022-11-20 DIAGNOSIS — K92.2 UPPER GI BLEEDING: ICD-10-CM

## 2022-11-20 LAB
ABS NEUT (OLG): 23.75 X10(3)/MCL (ref 2.1–9.2)
ALBUMIN SERPL-MCNC: 3 GM/DL (ref 3.4–4.8)
ALBUMIN/GLOB SERPL: 0.7 RATIO (ref 1.1–2)
ALP SERPL-CCNC: 119 UNIT/L (ref 40–150)
ALT SERPL-CCNC: 11 UNIT/L (ref 0–55)
ANISOCYTOSIS BLD QL SMEAR: ABNORMAL
APPEARANCE UR: CLEAR
APTT PPP: 30.2 SECONDS (ref 23.2–33.7)
AST SERPL-CCNC: 23 UNIT/L (ref 5–34)
BACTERIA #/AREA URNS AUTO: NORMAL /HPF
BACTERIA BLD CULT: NORMAL
BACTERIA BLD CULT: NORMAL
BILIRUB UR QL STRIP.AUTO: NEGATIVE MG/DL
BILIRUBIN DIRECT+TOT PNL SERPL-MCNC: 0.9 MG/DL
BNP BLD-MCNC: 65.3 PG/ML
BUN SERPL-MCNC: 19.4 MG/DL (ref 8.4–25.7)
CALCIUM SERPL-MCNC: 9.3 MG/DL (ref 8.8–10)
CHLORIDE SERPL-SCNC: 104 MMOL/L (ref 98–107)
CO2 SERPL-SCNC: 21 MMOL/L (ref 23–31)
COLOR UR AUTO: ABNORMAL
CREAT SERPL-MCNC: 1.1 MG/DL (ref 0.73–1.18)
EOSINOPHIL NFR BLD MANUAL: 0.26 X10(3)/MCL (ref 0–0.9)
EOSINOPHIL NFR BLD MANUAL: 1 %
ERYTHROCYTE [DISTWIDTH] IN BLOOD BY AUTOMATED COUNT: 15.9 % (ref 11.5–17)
FLUAV AG UPPER RESP QL IA.RAPID: NOT DETECTED
FLUBV AG UPPER RESP QL IA.RAPID: NOT DETECTED
GFR SERPLBLD CREATININE-BSD FMLA CKD-EPI: >60 MLS/MIN/1.73/M2
GIANT PLATELETS: ABNORMAL
GLOBULIN SER-MCNC: 4.1 GM/DL (ref 2.4–3.5)
GLUCOSE SERPL-MCNC: 145 MG/DL (ref 82–115)
GLUCOSE UR QL STRIP.AUTO: NEGATIVE MG/DL
HCT VFR BLD AUTO: 25.4 % (ref 42–52)
HGB BLD-MCNC: 8.2 GM/DL (ref 14–18)
IMM GRANULOCYTES # BLD AUTO: 0.22 X10(3)/MCL (ref 0–0.04)
IMM GRANULOCYTES NFR BLD AUTO: 0.8 %
INR BLD: 1.23 (ref 0–1.3)
INSTRUMENT WBC (OLG): 26.1 X10(3)/MCL
KETONES UR QL STRIP.AUTO: NEGATIVE MG/DL
LACTATE SERPL-SCNC: 3.3 MMOL/L (ref 0.5–2.2)
LEUKOCYTE ESTERASE UR QL STRIP.AUTO: NEGATIVE UNIT/L
LYMPHOCYTES NFR BLD MANUAL: 1.04 X10(3)/MCL
LYMPHOCYTES NFR BLD MANUAL: 4 %
MACROCYTES BLD QL SMEAR: ABNORMAL
MCH RBC QN AUTO: 31.5 PG (ref 27–31)
MCHC RBC AUTO-ENTMCNC: 32.3 MG/DL (ref 33–36)
MCV RBC AUTO: 97.7 FL (ref 80–94)
MONOCYTES NFR BLD MANUAL: 1.04 X10(3)/MCL (ref 0.1–1.3)
MONOCYTES NFR BLD MANUAL: 4 %
NEUTROPHILS NFR BLD MANUAL: 91 %
NITRITE UR QL STRIP.AUTO: NEGATIVE
NRBC BLD AUTO-RTO: 0 %
PH UR STRIP.AUTO: 5.5 [PH]
PLATELET # BLD AUTO: 367 X10(3)/MCL (ref 130–400)
PLATELET # BLD EST: NORMAL 10*3/UL
PMV BLD AUTO: 8.5 FL (ref 7.4–10.4)
POTASSIUM SERPL-SCNC: 4.3 MMOL/L (ref 3.5–5.1)
PROT SERPL-MCNC: 7.1 GM/DL (ref 5.8–7.6)
PROT UR QL STRIP.AUTO: ABNORMAL MG/DL
PROTHROMBIN TIME: 15.4 SECONDS (ref 12.5–14.5)
RBC # BLD AUTO: 2.6 X10(6)/MCL (ref 4.7–6.1)
RBC #/AREA URNS AUTO: <5 /HPF
RBC MORPH BLD: ABNORMAL
RBC UR QL AUTO: NEGATIVE UNIT/L
SARS-COV-2 RNA RESP QL NAA+PROBE: NOT DETECTED
SODIUM SERPL-SCNC: 138 MMOL/L (ref 136–145)
SP GR UR STRIP.AUTO: 1.02 (ref 1–1.03)
SQUAMOUS #/AREA URNS AUTO: <5 /HPF
TROPONIN I SERPL-MCNC: 0.06 NG/ML (ref 0–0.04)
UROBILINOGEN UR STRIP-ACNC: 1 MG/DL
WBC # SPEC AUTO: 26.1 X10(3)/MCL (ref 4.5–11.5)
WBC #/AREA URNS AUTO: <5 /HPF

## 2022-11-20 PROCEDURE — 25500020 PHARM REV CODE 255: Performed by: STUDENT IN AN ORGANIZED HEALTH CARE EDUCATION/TRAINING PROGRAM

## 2022-11-20 PROCEDURE — 83605 ASSAY OF LACTIC ACID: CPT | Performed by: STUDENT IN AN ORGANIZED HEALTH CARE EDUCATION/TRAINING PROGRAM

## 2022-11-20 PROCEDURE — 96367 TX/PROPH/DG ADDL SEQ IV INF: CPT

## 2022-11-20 PROCEDURE — 81001 URINALYSIS AUTO W/SCOPE: CPT | Performed by: STUDENT IN AN ORGANIZED HEALTH CARE EDUCATION/TRAINING PROGRAM

## 2022-11-20 PROCEDURE — 63600175 PHARM REV CODE 636 W HCPCS: Performed by: STUDENT IN AN ORGANIZED HEALTH CARE EDUCATION/TRAINING PROGRAM

## 2022-11-20 PROCEDURE — 0240U COVID/FLU A&B PCR: CPT | Performed by: STUDENT IN AN ORGANIZED HEALTH CARE EDUCATION/TRAINING PROGRAM

## 2022-11-20 PROCEDURE — 84484 ASSAY OF TROPONIN QUANT: CPT | Performed by: STUDENT IN AN ORGANIZED HEALTH CARE EDUCATION/TRAINING PROGRAM

## 2022-11-20 PROCEDURE — 85730 THROMBOPLASTIN TIME PARTIAL: CPT | Performed by: STUDENT IN AN ORGANIZED HEALTH CARE EDUCATION/TRAINING PROGRAM

## 2022-11-20 PROCEDURE — 96365 THER/PROPH/DIAG IV INF INIT: CPT

## 2022-11-20 PROCEDURE — 80053 COMPREHEN METABOLIC PANEL: CPT | Performed by: STUDENT IN AN ORGANIZED HEALTH CARE EDUCATION/TRAINING PROGRAM

## 2022-11-20 PROCEDURE — 99285 EMERGENCY DEPT VISIT HI MDM: CPT | Mod: 25

## 2022-11-20 PROCEDURE — 85610 PROTHROMBIN TIME: CPT | Performed by: STUDENT IN AN ORGANIZED HEALTH CARE EDUCATION/TRAINING PROGRAM

## 2022-11-20 PROCEDURE — 83880 ASSAY OF NATRIURETIC PEPTIDE: CPT | Performed by: STUDENT IN AN ORGANIZED HEALTH CARE EDUCATION/TRAINING PROGRAM

## 2022-11-20 PROCEDURE — 96375 TX/PRO/DX INJ NEW DRUG ADDON: CPT

## 2022-11-20 PROCEDURE — 11000001 HC ACUTE MED/SURG PRIVATE ROOM

## 2022-11-20 PROCEDURE — 96366 THER/PROPH/DIAG IV INF ADDON: CPT

## 2022-11-20 PROCEDURE — 93005 ELECTROCARDIOGRAM TRACING: CPT

## 2022-11-20 PROCEDURE — 25000003 PHARM REV CODE 250: Performed by: STUDENT IN AN ORGANIZED HEALTH CARE EDUCATION/TRAINING PROGRAM

## 2022-11-20 PROCEDURE — 93010 ELECTROCARDIOGRAM REPORT: CPT | Mod: ,,, | Performed by: INTERNAL MEDICINE

## 2022-11-20 PROCEDURE — 85027 COMPLETE CBC AUTOMATED: CPT | Performed by: STUDENT IN AN ORGANIZED HEALTH CARE EDUCATION/TRAINING PROGRAM

## 2022-11-20 PROCEDURE — 87040 BLOOD CULTURE FOR BACTERIA: CPT | Performed by: STUDENT IN AN ORGANIZED HEALTH CARE EDUCATION/TRAINING PROGRAM

## 2022-11-20 PROCEDURE — 93010 EKG 12-LEAD: ICD-10-PCS | Mod: ,,, | Performed by: INTERNAL MEDICINE

## 2022-11-20 RX ORDER — ERGOCALCIFEROL 1.25 MG/1
1 CAPSULE ORAL WEEKLY
COMMUNITY

## 2022-11-20 RX ORDER — ONDANSETRON 2 MG/ML
4 INJECTION INTRAMUSCULAR; INTRAVENOUS
Status: COMPLETED | OUTPATIENT
Start: 2022-11-20 | End: 2022-11-20

## 2022-11-20 RX ORDER — PANTOPRAZOLE SODIUM 40 MG/1
1 TABLET, DELAYED RELEASE ORAL DAILY
Status: ON HOLD | COMMUNITY
Start: 2022-02-24 | End: 2022-11-24 | Stop reason: HOSPADM

## 2022-11-20 RX ORDER — ACETAMINOPHEN 500 MG
1000 TABLET ORAL
Status: COMPLETED | OUTPATIENT
Start: 2022-11-20 | End: 2022-11-20

## 2022-11-20 RX ORDER — ALLOPURINOL 300 MG/1
1 TABLET ORAL DAILY
COMMUNITY
Start: 2022-02-24

## 2022-11-20 RX ORDER — RANOLAZINE 500 MG/1
1 TABLET, EXTENDED RELEASE ORAL 2 TIMES DAILY
COMMUNITY
Start: 2022-02-24

## 2022-11-20 RX ORDER — ROSUVASTATIN CALCIUM 5 MG/1
1 TABLET, COATED ORAL DAILY
COMMUNITY

## 2022-11-20 RX ORDER — MORPHINE SULFATE 4 MG/ML
4 INJECTION, SOLUTION INTRAMUSCULAR; INTRAVENOUS
Status: COMPLETED | OUTPATIENT
Start: 2022-11-20 | End: 2022-11-20

## 2022-11-20 RX ORDER — VANCOMYCIN HCL IN 5 % DEXTROSE 1G/250ML
2000 PLASTIC BAG, INJECTION (ML) INTRAVENOUS ONCE
Status: COMPLETED | OUTPATIENT
Start: 2022-11-20 | End: 2022-11-20

## 2022-11-20 RX ORDER — QUINAPRIL 40 MG/1
1 TABLET ORAL DAILY
COMMUNITY
Start: 2022-02-24

## 2022-11-20 RX ORDER — ASPIRIN 81 MG/1
81 TABLET ORAL DAILY
COMMUNITY
Start: 2022-02-24

## 2022-11-20 RX ADMIN — VANCOMYCIN HYDROCHLORIDE 2000 MG: 1 INJECTION, POWDER, LYOPHILIZED, FOR SOLUTION INTRAVENOUS at 03:11

## 2022-11-20 RX ADMIN — ONDANSETRON 4 MG: 2 INJECTION INTRAMUSCULAR; INTRAVENOUS at 03:11

## 2022-11-20 RX ADMIN — ACETAMINOPHEN 1000 MG: 500 TABLET ORAL at 03:11

## 2022-11-20 RX ADMIN — PIPERACILLIN AND TAZOBACTAM 4.5 G: 4; .5 INJECTION, POWDER, LYOPHILIZED, FOR SOLUTION INTRAVENOUS; PARENTERAL at 03:11

## 2022-11-20 RX ADMIN — IOPAMIDOL 100 ML: 755 INJECTION, SOLUTION INTRAVENOUS at 05:11

## 2022-11-20 RX ADMIN — MORPHINE SULFATE 4 MG: 4 INJECTION INTRAVENOUS at 03:11

## 2022-11-20 RX ADMIN — SODIUM CHLORIDE, POTASSIUM CHLORIDE, SODIUM LACTATE AND CALCIUM CHLORIDE 3171 ML: 600; 310; 30; 20 INJECTION, SOLUTION INTRAVENOUS at 04:11

## 2022-11-20 NOTE — ED PROVIDER NOTES
Encounter Date: 11/20/2022    SCRIBE #1 NOTE: I, Simi Bourne, am scribing for, and in the presence of,  Jude Gonzalez MD. I have scribed the following portions of the note - the EKG reading. Other sections scribed: HPI,ROS,PE.     History     Chief Complaint   Patient presents with    Fatigue     Patient has been complaining of weakness.  Pt recently diagnosed with a mass in his abdomen with possible other areas, but was unable to have a biopsy done yet.  Pt had blood transfusion done earlier this week. Pt occasionally coughing up blood.     Mr. Vallecillo is a 82-year-old male with past medical history CAD/stents and HTN presenting to ED c/o weakness today. Pt was seen here on 11/11 for further evaluation by his PCP  Dr. Iam Rojo who has been monitoring his white blood cell count and hemoglobin; patient states that he has very dark stool and is occasionally coughing up blood. CT then of Abd/pelvis showed large aggressive mass seen in the left upper quadrant malignancy. Had EGD done 11/14 with SOULEYMANE Nunn MD and recent transfusion done. Today, pt states he is unable to stand up with increased flatulence, abdominal pain, nausea, and fever of 102.4. States he has been taking Protonix and methadone.     The history is provided by the patient. No  was used.   Fatigue  This is a new problem. The current episode started more than 1 week ago. The problem occurs constantly. The problem has not changed since onset.Associated symptoms include abdominal pain. Pertinent negatives include no chest pain and no shortness of breath.       Review of patient's allergies indicates:  No Known Allergies  Past Medical History:   Diagnosis Date    Coronary artery disease     Sleep apnea      Past Surgical History:   Procedure Laterality Date    ESOPHAGOGASTRODUODENOSCOPY N/A 11/14/2022    Procedure: EGD (ESOPHAGOGASTRODUODENOSCOPY);  Surgeon: SOULEYMANE Nunn MD;  [facility] ENDOSCOPY;   Service: Gastroenterology;  Laterality: N/A;    JOINT REPLACEMENT       No family history on file.  Social History     Tobacco Use    Smoking status: Never    Smokeless tobacco: Never   Substance Use Topics    Alcohol use: Not Currently     Review of Systems   Constitutional:  Positive for fatigue and fever.   HENT:  Negative for sore throat.    Eyes:  Negative for visual disturbance.   Respiratory:  Negative for shortness of breath.    Cardiovascular:  Negative for chest pain.   Gastrointestinal:  Positive for abdominal pain, blood in stool, nausea and vomiting (Hemetemisis).   Genitourinary:  Negative for dysuria.   Musculoskeletal:  Negative for joint swelling.   Skin:  Negative for rash.   Neurological:  Positive for weakness.   Psychiatric/Behavioral:  Negative for confusion.      Physical Exam     Initial Vitals [11/20/22 1349]   BP Pulse Resp Temp SpO2   (!) 156/74 93 18 (!) 102.4 °F (39.1 °C) 98 %      MAP       --         Physical Exam    Nursing note and vitals reviewed.  Constitutional: He appears well-developed. He does not appear ill. No distress.   HENT:   Head: Normocephalic and atraumatic.   Mouth/Throat: Oropharynx is clear and moist.   Eyes: Conjunctivae and EOM are normal.   Neck: Neck supple.   Normal range of motion.  Cardiovascular:  Normal rate and regular rhythm.           No murmur heard.  Pulmonary/Chest: Breath sounds normal. No respiratory distress. He exhibits no tenderness.   Abdominal: Abdomen is soft. Bowel sounds are normal. He exhibits no distension. There is abdominal tenderness.   Diffuse abdominal tenderness to palpation    Musculoskeletal:         General: Normal range of motion.      Cervical back: Normal range of motion and neck supple.      Lumbar back: Normal. No tenderness. Normal range of motion.     Neurological: He is alert and oriented to person, place, and time. He has normal strength. No cranial nerve deficit or sensory deficit.   Psychiatric: He has a normal mood and  affect. His mood appears not anxious.       ED Course   Critical Care    Date/Time: 11/20/2022 4:00 PM  Performed by: Jude Gonzalez MD  Authorized by: Jude Gonzalez MD   Direct patient critical care time: 15 minutes  Additional history critical care time: 8 minutes  Ordering / reviewing critical care time: 5 minutes  Documentation critical care time: 4 minutes  Consulting other physicians critical care time: 5 minutes  Total critical care time (exclusive of procedural time) : 37 minutes  Critical care time was exclusive of separately billable procedures and treating other patients and teaching time.  Critical care was necessary to treat or prevent imminent or life-threatening deterioration of the following conditions: circulatory failure, metabolic crisis, renal failure, endocrine crisis and sepsis.  Critical care was time spent personally by me on the following activities: development of treatment plan with patient or surrogate, discussions with consultants, interpretation of cardiac output measurements, evaluation of patient's response to treatment, examination of patient, obtaining history from patient or surrogate, ordering and performing treatments and interventions, ordering and review of laboratory studies, ordering and review of radiographic studies, pulse oximetry, re-evaluation of patient's condition and review of old charts.      Labs Reviewed   COMPREHENSIVE METABOLIC PANEL - Abnormal; Notable for the following components:       Result Value    Carbon Dioxide 21 (*)     Glucose Level 145 (*)     Albumin Level 3.0 (*)     Globulin 4.1 (*)     Albumin/Globulin Ratio 0.7 (*)     All other components within normal limits   LACTIC ACID, PLASMA - Abnormal; Notable for the following components:    Lactic Acid Level 3.3 (*)     All other components within normal limits   URINALYSIS, REFLEX TO URINE CULTURE - Abnormal; Notable for the following components:    Protein, UA Trace (*)     All other components  within normal limits   TROPONIN I - Abnormal; Notable for the following components:    Troponin-I 0.059 (*)     All other components within normal limits   PROTIME-INR - Abnormal; Notable for the following components:    PT 15.4 (*)     All other components within normal limits   CBC WITH DIFFERENTIAL - Abnormal; Notable for the following components:    WBC 26.1 (*)     RBC 2.60 (*)     Hgb 8.2 (*)     Hct 25.4 (*)     MCV 97.7 (*)     MCH 31.5 (*)     MCHC 32.3 (*)     IG# 0.22 (*)     All other components within normal limits   MANUAL DIFFERENTIAL - Abnormal; Notable for the following components:    Abs Neut 23.751 (*)     RBC Morph Abnormal (*)     Anisocyte 1+ (*)     Macrocyte 1+ (*)     All other components within normal limits   B-TYPE NATRIURETIC PEPTIDE - Normal   APTT - Normal   COVID/FLU A&B PCR - Normal    Narrative:     The Xpert Xpress SARS-CoV-2/FLU/RSV plus is a rapid, multiplexed real-time PCR test intended for the simultaneous qualitative detection and differentiation of SARS-CoV-2, Influenza A, Influenza B, and respiratory syncytial virus (RSV) viral RNA in either nasopharyngeal swab or nasal swab specimens.         URINALYSIS, MICROSCOPIC - Normal   CBC W/ AUTO DIFFERENTIAL    Narrative:     The following orders were created for panel order CBC auto differential.  Procedure                               Abnormality         Status                     ---------                               -----------         ------                     CBC with Differential[[id number]]        Abnormal            Final result               Manual Differential[[id number]]          Abnormal            Final result                 Please view results for these tests on the individual orders.     EKG Readings: (Independently Interpreted)   EKG done at 1356: Sinus tachycardia at 105bpm with RBBB. No STEMI   ECG Results              EKG 12-lead (Final result)  Result time 11/20/22 14:25:08      Final result by Interface,  Lab In [facility] (11/20/22 14:25:08)                   Narrative:    Test Reason : R00.0,    Vent. Rate : 105 BPM     Atrial Rate : 105 BPM     P-R Int : 170 ms          QRS Dur : 116 ms      QT Int : 392 ms       P-R-T Axes : 023 048 020 degrees     QTc Int : 518 ms    Sinus tachycardia  Right bundle branch block  T wave abnormality, consider lateral ischemia  Abnormal ECG  No previous ECGs available  Confirmed by Ansley Jarquin MD ([id number]) on 11/20/2022 2:24:57 PM    Referred By: LUCRECIA   SELF           Confirmed By:Ansley Jarquin MD                                  Imaging Results              CT Abdomen Pelvis With Contrast (Final result)  Result time 11/20/22 17:55:18      Final result by Bari Cody MD (11/20/22 17:55:18)                   Impression:      Unchanged appearance of the masslike lesion likely arising from the tail the pancreas with extension into the lesser sac as described above.  Known diffuse metastatic disease is unchanged.      Electronically signed by: Bari Cody MD  Date:    11/20/2022  Time:    17:55               Narrative:    EXAMINATION:  CT ABDOMEN PELVIS WITH CONTRAST    CLINICAL HISTORY:  Abdominal abscess/infection suspected;Abdominal pain, acute, nonlocalized;    TECHNIQUE:  Multiple cross-sectional images of the abdomen and pelvis were obtained after the intravenous administration of contrast.  Coronal and sagittal reformatted images obtained.  An automated dose exposure technique was utilized.  This limits radiation does the patient.    COMPARISON:  11/16/2022    FINDINGS:  Dependent atelectatic changes lungs with small to moderate effusion on the left.  Known lesion within the right lung base is also unchanged.  Heart size within normal limits with coronary artery calcifications.  Cardiophrenic adenopathy again noted.    Likely aggressive lesion arising from the tail the pancreas and invading the spleen.  The overall appearance is not significantly changed also the masslike  lesion extends into the lesser sac and exerts mass effect on the gastric body.  This is also unchanged.  Metastatic lymph nodes in the lesser sac encasing the superior mesenteric vein/portal vein not significantly changed.  At least 2 liver metastases unchanged..  The adrenal glands and kidneys are grossly normal.  Bosniak type 1 cyst involving the left kidney.    Mass effect upon the gastric body which is unchanged.  Small bowel is of normal caliber.  Colon is also normal caliber with scattered colonic diverticuli.  Normal appendix with appendicoliths at the tip versus metallic density.    Prostate is enlarged.  Bladder is under distended.  The course and caliber of the abdominal aorta is normal with scattered calcified atheromatous disease.  No free fluid in the abdomen pelvis.  Adenopathy as above.  No definitive peritoneal seeding is identified.    Scattered sclerotic lesions are identified of the lumbar spine.  Spondylotic changes are identified.  These are unchanged.  Soft tissues are grossly normal with small ventral hernia.  Metallic foreign body is identified in the right inguinal region.                                       X-Ray Chest AP Portable (Final result)  Result time 11/20/22 15:04:18      Final result by Louis Alex MD (11/20/22 15:04:18)                   Narrative:    EXAMINATION  XR CHEST AP PORTABLE    CLINICAL HISTORY  Sepsis;    TECHNIQUE  A total of 1 frontal view(s) of the chest.    COMPARISON  11 November 2022    FINDINGS  Lines/tubes/devices: ECG leads overlie the imaged region.    The cardiac silhouette and central vascular structures are unchanged.  The trachea is midline. No new or worsening consolidation is identified. There is no enlarging pleural effusion or convincing pneumothorax.    Regional osseous structures and extrathoracic soft tissues are similar.    IMPRESSION  No significant interval change.      Electronically signed by: Louis  "Isai  Date:    11/20/2022  Time:    15:04                                     Medications   fentaNYL (SUBLIMAZE) 50 mcg/mL injection (has no administration in time range)   midazolam (VERSED) 1 mg/mL injection (has no administration in time range)   LIDOcaine (PF) 20 mg/mL (2%) 20 mg/mL (2 %) injection (has no administration in time range)   lactated ringers bolus 3,171 mL (0 mLs Intravenous Stopped 11/20/22 1711)   piperacillin-tazobactam (ZOSYN) 4.5 g in dextrose 5 % in water (D5W) 5 % 100 mL IVPB (MB+) (0 g Intravenous Stopped 11/20/22 1557)   vancomycin in dextrose 5 % 1 gram/250 mL IVPB 2,000 mg (0 mg Intravenous Stopped 11/20/22 1835)   acetaminophen tablet 1,000 mg (1,000 mg Oral Given 11/20/22 1516)   morphine injection 4 mg (4 mg Intravenous Given 11/20/22 1528)   ondansetron injection 4 mg (4 mg Intravenous Given 11/20/22 1527)   iopamidoL (ISOVUE-370) injection 100 mL (100 mLs Intravenous Given 11/20/22 1738)   midazolam (VERSED) 1 mg/mL injection (1 mg Intravenous Given 11/22/22 1149)   fentaNYL 50 mcg/mL injection (25 mcg Intravenous Given 11/22/22 1149)   LIDOcaine HCL 20 mg/ml (2%) injection (5 mLs Other Given 11/22/22 1150)   sodium,potassium,mag sulfates 17.5-3.13-1.6 gram SolR 354 mL (354 mLs Oral Given 11/23/22 0422)     Medical Decision Making:   Clinical Tests:   Lab Tests: Reviewed and Ordered  Radiological Study: Ordered and Reviewed  Medical Tests: Reviewed and Ordered  Sepsis Perfusion Assessment: "I attest a sepsis perfusion exam was performed within 6 hours of sepsis, severe sepsis, or septic shock presentation, following fluid resuscitation."    Sepsis Perfusion Assessment Complete: 11/20/2022 4:00 PM    ED Management:  Patient is a 83 y/o male who presents to the ED for fever, abdominal pain, generalized weakness.  Concern for sepsis, cultures obtained, started on abx.  Labs/imaging as noted.  Hx of metastatic cancer, anemia, hemoglobin stable.  Will admit to medicine. All results " discussed with patient and family.  Verbalized understanding and agreed to plan.         Scribe Attestation:   Scribe #1: I performed the above scribed service and the documentation accurately describes the services I performed. I attest to the accuracy of the note.    Attending Attestation:           Physician Attestation for Scribe:  Physician Attestation Statement for Scribe #1: I, Jude Gonzalez MD, reviewed documentation, as scribed by Simi Bourne in my presence, and it is both accurate and complete.           ED Course as of 12/04/22 1045   Sun Nov 20, 2022   1802 Paged hospitalist  [DP]   1840 Call and Consult Hospitalist, pt will be admitted at this time  [DP]      ED Course User Index  [DP] Ysabel Bourne                   Clinical Impression:   Final diagnoses:  [R00.0] Tachycardia  [R53.1] Weakness (Primary)  [A41.9] Sepsis, due to unspecified organism, unspecified whether acute organ dysfunction present  [C79.9] Metastatic malignant neoplasm, unspecified site  [A41.9] Sepsis  [D64.9] Anemia, unspecified type  [D72.829] Leukocytosis, unspecified type        ED Disposition Condition    Admit Stable                Jude Gonzalez MD  12/04/22 1045

## 2022-11-21 PROBLEM — A41.9 SEPSIS: Status: ACTIVE | Noted: 2022-11-21

## 2022-11-21 LAB
ABO + RH BLD: NORMAL
ABO + RH BLD: NORMAL
ALBUMIN SERPL-MCNC: 2.2 GM/DL (ref 3.4–4.8)
ALBUMIN/GLOB SERPL: 0.8 RATIO (ref 1.1–2)
ALP SERPL-CCNC: 82 UNIT/L (ref 40–150)
ALT SERPL-CCNC: 7 UNIT/L (ref 0–55)
AST SERPL-CCNC: 16 UNIT/L (ref 5–34)
BASOPHILS # BLD AUTO: 0.07 X10(3)/MCL (ref 0–0.2)
BASOPHILS NFR BLD AUTO: 0.3 %
BILIRUBIN DIRECT+TOT PNL SERPL-MCNC: 0.6 MG/DL
BLD PROD TYP BPU: NORMAL
BLD PROD TYP BPU: NORMAL
BLOOD UNIT EXPIRATION DATE: NORMAL
BLOOD UNIT EXPIRATION DATE: NORMAL
BLOOD UNIT TYPE CODE: 2800
BLOOD UNIT TYPE CODE: 2800
BUN SERPL-MCNC: 14.9 MG/DL (ref 8.4–25.7)
CALCIUM SERPL-MCNC: 8.3 MG/DL (ref 8.8–10)
CHLORIDE SERPL-SCNC: 103 MMOL/L (ref 98–107)
CO2 SERPL-SCNC: 20 MMOL/L (ref 23–31)
CREAT SERPL-MCNC: 0.73 MG/DL (ref 0.73–1.18)
CROSSMATCH INTERPRETATION: NORMAL
CROSSMATCH INTERPRETATION: NORMAL
DISPENSE STATUS: NORMAL
DISPENSE STATUS: NORMAL
EOSINOPHIL # BLD AUTO: 0.48 X10(3)/MCL (ref 0–0.9)
EOSINOPHIL NFR BLD AUTO: 2.3 %
ERYTHROCYTE [DISTWIDTH] IN BLOOD BY AUTOMATED COUNT: 15.9 % (ref 11.5–17)
GFR SERPLBLD CREATININE-BSD FMLA CKD-EPI: >60 MLS/MIN/1.73/M2
GLOBULIN SER-MCNC: 2.9 GM/DL (ref 2.4–3.5)
GLUCOSE SERPL-MCNC: 102 MG/DL (ref 82–115)
GROUP & RH: NORMAL
HCT VFR BLD AUTO: 20.5 % (ref 42–52)
HGB BLD-MCNC: 6.7 GM/DL (ref 14–18)
IMM GRANULOCYTES # BLD AUTO: 0.14 X10(3)/MCL (ref 0–0.04)
IMM GRANULOCYTES NFR BLD AUTO: 0.7 %
INDIRECT COOMBS GEL: NORMAL
LYMPHOCYTES # BLD AUTO: 1.73 X10(3)/MCL (ref 0.6–4.6)
LYMPHOCYTES NFR BLD AUTO: 8.4 %
MAGNESIUM SERPL-MCNC: 1.7 MG/DL (ref 1.6–2.6)
MCH RBC QN AUTO: 32.4 PG (ref 27–31)
MCHC RBC AUTO-ENTMCNC: 32.7 MG/DL (ref 33–36)
MCV RBC AUTO: 99 FL (ref 80–94)
MONOCYTES # BLD AUTO: 1.53 X10(3)/MCL (ref 0.1–1.3)
MONOCYTES NFR BLD AUTO: 7.4 %
NEUTROPHILS # BLD AUTO: 16.6 X10(3)/MCL (ref 2.1–9.2)
NEUTROPHILS NFR BLD AUTO: 80.9 %
NRBC BLD AUTO-RTO: 0 %
PHOSPHATE SERPL-MCNC: 2.3 MG/DL (ref 2.3–4.7)
PLATELET # BLD AUTO: 315 X10(3)/MCL (ref 130–400)
PMV BLD AUTO: 8.7 FL (ref 7.4–10.4)
POTASSIUM SERPL-SCNC: 3.9 MMOL/L (ref 3.5–5.1)
PROT SERPL-MCNC: 5.1 GM/DL (ref 5.8–7.6)
RBC # BLD AUTO: 2.07 X10(6)/MCL (ref 4.7–6.1)
SODIUM SERPL-SCNC: 132 MMOL/L (ref 136–145)
TROPONIN I SERPL-MCNC: 0.04 NG/ML (ref 0–0.04)
UNIT NUMBER: NORMAL
UNIT NUMBER: NORMAL
WBC # SPEC AUTO: 20.5 X10(3)/MCL (ref 4.5–11.5)

## 2022-11-21 PROCEDURE — 84100 ASSAY OF PHOSPHORUS: CPT | Performed by: INTERNAL MEDICINE

## 2022-11-21 PROCEDURE — 25000003 PHARM REV CODE 250: Performed by: INTERNAL MEDICINE

## 2022-11-21 PROCEDURE — 36430 TRANSFUSION BLD/BLD COMPNT: CPT

## 2022-11-21 PROCEDURE — 83735 ASSAY OF MAGNESIUM: CPT | Performed by: INTERNAL MEDICINE

## 2022-11-21 PROCEDURE — 63600175 PHARM REV CODE 636 W HCPCS: Performed by: INTERNAL MEDICINE

## 2022-11-21 PROCEDURE — 85025 COMPLETE CBC W/AUTO DIFF WBC: CPT | Performed by: INTERNAL MEDICINE

## 2022-11-21 PROCEDURE — 21400001 HC TELEMETRY ROOM

## 2022-11-21 PROCEDURE — C9113 INJ PANTOPRAZOLE SODIUM, VIA: HCPCS | Performed by: INTERNAL MEDICINE

## 2022-11-21 PROCEDURE — 84484 ASSAY OF TROPONIN QUANT: CPT | Performed by: INTERNAL MEDICINE

## 2022-11-21 PROCEDURE — 86850 RBC ANTIBODY SCREEN: CPT | Performed by: INTERNAL MEDICINE

## 2022-11-21 PROCEDURE — P9016 RBC LEUKOCYTES REDUCED: HCPCS | Performed by: INTERNAL MEDICINE

## 2022-11-21 PROCEDURE — 36415 COLL VENOUS BLD VENIPUNCTURE: CPT | Performed by: INTERNAL MEDICINE

## 2022-11-21 PROCEDURE — 80053 COMPREHEN METABOLIC PANEL: CPT | Performed by: INTERNAL MEDICINE

## 2022-11-21 PROCEDURE — 86923 COMPATIBILITY TEST ELECTRIC: CPT | Performed by: INTERNAL MEDICINE

## 2022-11-21 RX ORDER — PANTOPRAZOLE SODIUM 40 MG/1
40 TABLET, DELAYED RELEASE ORAL 2 TIMES DAILY
Status: DISCONTINUED | OUTPATIENT
Start: 2022-11-21 | End: 2022-11-21

## 2022-11-21 RX ORDER — AMOXICILLIN 250 MG
1 CAPSULE ORAL 2 TIMES DAILY PRN
Status: DISCONTINUED | OUTPATIENT
Start: 2022-11-21 | End: 2022-11-24 | Stop reason: HOSPADM

## 2022-11-21 RX ORDER — LISINOPRIL 20 MG/1
40 TABLET ORAL DAILY
Status: DISCONTINUED | OUTPATIENT
Start: 2022-11-21 | End: 2022-11-24 | Stop reason: HOSPADM

## 2022-11-21 RX ORDER — ACETAMINOPHEN 325 MG/1
650 TABLET ORAL EVERY 4 HOURS PRN
Status: DISCONTINUED | OUTPATIENT
Start: 2022-11-21 | End: 2022-11-24 | Stop reason: HOSPADM

## 2022-11-21 RX ORDER — RANOLAZINE 500 MG/1
500 TABLET, EXTENDED RELEASE ORAL 2 TIMES DAILY
Status: DISCONTINUED | OUTPATIENT
Start: 2022-11-21 | End: 2022-11-24 | Stop reason: HOSPADM

## 2022-11-21 RX ORDER — OXYCODONE AND ACETAMINOPHEN 5; 325 MG/1; MG/1
1 TABLET ORAL EVERY 4 HOURS PRN
Status: DISCONTINUED | OUTPATIENT
Start: 2022-11-21 | End: 2022-11-24 | Stop reason: HOSPADM

## 2022-11-21 RX ORDER — POLYETHYLENE GLYCOL 3350 17 G/17G
17 POWDER, FOR SOLUTION ORAL 2 TIMES DAILY PRN
Status: DISCONTINUED | OUTPATIENT
Start: 2022-11-21 | End: 2022-11-24 | Stop reason: HOSPADM

## 2022-11-21 RX ORDER — PROCHLORPERAZINE EDISYLATE 5 MG/ML
5 INJECTION INTRAMUSCULAR; INTRAVENOUS EVERY 6 HOURS PRN
Status: DISCONTINUED | OUTPATIENT
Start: 2022-11-21 | End: 2022-11-24 | Stop reason: HOSPADM

## 2022-11-21 RX ORDER — SODIUM CHLORIDE 0.9 % (FLUSH) 0.9 %
10 SYRINGE (ML) INJECTION
Status: DISCONTINUED | OUTPATIENT
Start: 2022-11-21 | End: 2022-11-24 | Stop reason: HOSPADM

## 2022-11-21 RX ORDER — SODIUM CHLORIDE, SODIUM LACTATE, POTASSIUM CHLORIDE, CALCIUM CHLORIDE 600; 310; 30; 20 MG/100ML; MG/100ML; MG/100ML; MG/100ML
INJECTION, SOLUTION INTRAVENOUS CONTINUOUS
Status: DISCONTINUED | OUTPATIENT
Start: 2022-11-21 | End: 2022-11-21

## 2022-11-21 RX ORDER — MORPHINE SULFATE 4 MG/ML
4 INJECTION, SOLUTION INTRAMUSCULAR; INTRAVENOUS EVERY 4 HOURS PRN
Status: DISCONTINUED | OUTPATIENT
Start: 2022-11-21 | End: 2022-11-24 | Stop reason: HOSPADM

## 2022-11-21 RX ORDER — PANTOPRAZOLE SODIUM 40 MG/1
40 TABLET, DELAYED RELEASE ORAL DAILY
Status: DISCONTINUED | OUTPATIENT
Start: 2022-11-21 | End: 2022-11-24 | Stop reason: HOSPADM

## 2022-11-21 RX ORDER — ALLOPURINOL 300 MG/1
300 TABLET ORAL DAILY
Status: DISCONTINUED | OUTPATIENT
Start: 2022-11-21 | End: 2022-11-24 | Stop reason: HOSPADM

## 2022-11-21 RX ORDER — ATORVASTATIN CALCIUM 10 MG/1
20 TABLET, FILM COATED ORAL DAILY
Status: DISCONTINUED | OUTPATIENT
Start: 2022-11-21 | End: 2022-11-24 | Stop reason: HOSPADM

## 2022-11-21 RX ORDER — HYDROCODONE BITARTRATE AND ACETAMINOPHEN 500; 5 MG/1; MG/1
TABLET ORAL
Status: DISCONTINUED | OUTPATIENT
Start: 2022-11-21 | End: 2022-11-24 | Stop reason: HOSPADM

## 2022-11-21 RX ORDER — MAG HYDROX/ALUMINUM HYD/SIMETH 200-200-20
30 SUSPENSION, ORAL (FINAL DOSE FORM) ORAL 4 TIMES DAILY PRN
Status: DISCONTINUED | OUTPATIENT
Start: 2022-11-21 | End: 2022-11-24 | Stop reason: HOSPADM

## 2022-11-21 RX ORDER — ONDANSETRON 2 MG/ML
4 INJECTION INTRAMUSCULAR; INTRAVENOUS EVERY 4 HOURS PRN
Status: DISCONTINUED | OUTPATIENT
Start: 2022-11-21 | End: 2022-11-24 | Stop reason: HOSPADM

## 2022-11-21 RX ORDER — SIMETHICONE 80 MG
1 TABLET,CHEWABLE ORAL 4 TIMES DAILY PRN
Status: DISCONTINUED | OUTPATIENT
Start: 2022-11-21 | End: 2022-11-24 | Stop reason: HOSPADM

## 2022-11-21 RX ORDER — PANTOPRAZOLE SODIUM 40 MG/10ML
40 INJECTION, POWDER, LYOPHILIZED, FOR SOLUTION INTRAVENOUS EVERY 12 HOURS
Status: DISCONTINUED | OUTPATIENT
Start: 2022-11-21 | End: 2022-11-21

## 2022-11-21 RX ORDER — TALC
6 POWDER (GRAM) TOPICAL NIGHTLY PRN
Status: DISCONTINUED | OUTPATIENT
Start: 2022-11-21 | End: 2022-11-24 | Stop reason: HOSPADM

## 2022-11-21 RX ORDER — ACETAMINOPHEN 500 MG
1000 TABLET ORAL EVERY 6 HOURS PRN
Status: DISCONTINUED | OUTPATIENT
Start: 2022-11-21 | End: 2022-11-24 | Stop reason: HOSPADM

## 2022-11-21 RX ADMIN — MORPHINE SULFATE 4 MG: 4 INJECTION INTRAVENOUS at 05:11

## 2022-11-21 RX ADMIN — RANOLAZINE 500 MG: 500 TABLET, EXTENDED RELEASE ORAL at 09:11

## 2022-11-21 RX ADMIN — LISINOPRIL 40 MG: 20 TABLET ORAL at 11:11

## 2022-11-21 RX ADMIN — PANTOPRAZOLE SODIUM 40 MG: 40 INJECTION, POWDER, FOR SOLUTION INTRAVENOUS at 05:11

## 2022-11-21 RX ADMIN — PIPERACILLIN AND TAZOBACTAM 4.5 G: 4; .5 INJECTION, POWDER, LYOPHILIZED, FOR SOLUTION INTRAVENOUS; PARENTERAL at 06:11

## 2022-11-21 RX ADMIN — PIPERACILLIN AND TAZOBACTAM 4.5 G: 4; .5 INJECTION, POWDER, LYOPHILIZED, FOR SOLUTION INTRAVENOUS; PARENTERAL at 05:11

## 2022-11-21 RX ADMIN — PANTOPRAZOLE SODIUM 40 MG: 40 TABLET, DELAYED RELEASE ORAL at 10:11

## 2022-11-21 RX ADMIN — ALLOPURINOL 300 MG: 300 TABLET ORAL at 11:11

## 2022-11-21 RX ADMIN — SODIUM CHLORIDE, POTASSIUM CHLORIDE, SODIUM LACTATE AND CALCIUM CHLORIDE: 600; 310; 30; 20 INJECTION, SOLUTION INTRAVENOUS at 05:11

## 2022-11-21 RX ADMIN — ATORVASTATIN CALCIUM 20 MG: 10 TABLET, FILM COATED ORAL at 11:11

## 2022-11-21 NOTE — CONSULTS
Gastroenterology Consultation Note    Reason for Consult:  Anemia/Dark Stools    PCP:   Primary Doctor No    Referring MD: Poonam Gaxiola MD      History of Present Illness (HPI):  Patient is an 82 year old male with Hx CAD/PCI on aspirin and Brilinta, and HTN who presented to the ED with fatigue/weakness and dark stools.     On arrival, H&H 8.2/25.4 associated with temp of 102.4F.    Chest X-ray with no acute findings. CT Abdomen pelvis with contrast showing no acute changes in comparison to recent Cts on 11/11 and 11/16: Unchanged appearance of the masslike lesion likely arising from the tail the pancreas with extension into the lesser sac as described above.  Known diffuse metastatic disease is unchanged.    Previous records reviewed.     Of note, patient was recently admitted with GI bleeding, persistent leukocytosis of unclear etiology with CT imaging noted to have LUQ abdominal mass that appeared to be arising from the tail of the pancreas with extension into the lesser sac and finding of diffuse metastatic disease.     EGD at this time, on 11/14/22 showed normal esophagus, extrinsic compression in the gastric body, normal duodenal bulb, first portion of the duodenum, second portion of the duodenum and third portion of the duodenum. No specimens collected.     Most recent colonoscopy in September 2022 with Dr. Hitchcock in [place] for abdominal pain- unremarkable per patient. No records able to be reviwed.     He was discharged given no IR availability last week and was planned to follow up with IR today 11/21/2022 for a CT-guided biopsy.      Family at bedside.   Patient states that upon discharge, dark stools never completely resolved, continuing to be described as dark red/black. He endorses diffuse abdominal discomfort, something to be chronic in nature to him. He denies any localized abdominal pain, N/V. Patient states he began to experience worsening fatigue and chills at home which is what led him to  presenting to the ER.        ROS:  Review of Systems   Constitutional:  Positive for chills, fever and malaise/fatigue.   HENT:  Negative for sore throat.    Respiratory:  Negative for sputum production, shortness of breath, wheezing and stridor.    Cardiovascular:  Negative for chest pain.   Gastrointestinal:  Positive for abdominal pain (diffuse abdominal discomfort) and melena. Negative for heartburn, nausea and vomiting.   Musculoskeletal:  Negative for falls.   Skin:  Negative for rash.   Neurological:  Positive for weakness. Negative for dizziness, seizures, loss of consciousness and headaches.   Psychiatric/Behavioral:  The patient is not nervous/anxious.      Medical History:   Past Medical History:   Diagnosis Date    Coronary artery disease     Sleep apnea        Surgical History:   Past Surgical History:   Procedure Laterality Date    ESOPHAGOGASTRODUODENOSCOPY N/A 11/14/2022    Procedure: EGD (ESOPHAGOGASTRODUODENOSCOPY);  Surgeon: SOULEYMANE Nunn MD;  Location: [facility] ENDOSCOPY;  Service: Gastroenterology;  Laterality: N/A;    JOINT REPLACEMENT         Family History:   No family history on file..     Social History:   Social History     Tobacco Use    Smoking status: Never    Smokeless tobacco: Never   Substance Use Topics    Alcohol use: Not Currently       Allergies:  Review of patient's allergies indicates:  No Known Allergies    Medications Prior to Admission   Medication Sig Dispense Refill Last Dose    allopurinoL (ZYLOPRIM) 300 MG tablet Take 1 tablet by mouth once daily.   11/20/2022    aspirin (ECOTRIN) 81 MG EC tablet Take 81 mg by mouth once daily.   11/20/2022    pantoprazole (PROTONIX) 40 MG tablet Take 1 tablet (40 mg total) by mouth once daily. 30 tablet 11 11/20/2022    pantoprazole (PROTONIX) 40 MG tablet Take 1 tablet by mouth once daily.   11/20/2022    quinapriL (ACCUPRIL) 40 MG tablet Take 1 tablet by mouth once daily.   11/20/2022    ranolazine (RANEXA) 500 MG Tb12 Take  "1 tablet by mouth 2 (two) times a day.   11/20/2022    rosuvastatin (CRESTOR) 5 MG tablet Take 1 tablet by mouth once daily.   11/20/2022    ergocalciferol (VITAMIN D2) 50,000 unit Cap Take 1 capsule by mouth once a week.       nitroGLYCERIN (NITROSTAT) 0.4 MG SL tablet Place 0.4 mg under the tongue.   Unknown         Objective Findings:    Vital Signs:  BP (!) 144/73   Pulse 60   Temp 98 °F (36.7 °C)   Resp 16   Ht 6' 3" (1.905 m)   Wt 105.6 kg (232 lb 12.9 oz)   SpO2 95%   BMI 29.10 kg/m²   Body mass index is 29.1 kg/m².    Physical Exam:  Physical Exam  Constitutional:       General: He is not in acute distress.  HENT:      Head: Normocephalic.      Nose: Nose normal.   Eyes:      General: No scleral icterus.     Extraocular Movements: Extraocular movements intact.      Conjunctiva/sclera: Conjunctivae normal.      Pupils: Pupils are equal, round, and reactive to light.   Cardiovascular:      Rate and Rhythm: Normal rate.      Pulses: Normal pulses.   Pulmonary:      Effort: Pulmonary effort is normal. No respiratory distress.      Breath sounds: No stridor.   Abdominal:      General: Abdomen is flat. Bowel sounds are normal. There is no distension.      Palpations: Abdomen is soft. There is no mass.      Tenderness: There is no abdominal tenderness. There is no guarding.   Musculoskeletal:         General: No swelling.      Cervical back: Normal range of motion.   Skin:     Coloration: Skin is not jaundiced.   Neurological:      General: No focal deficit present.      Mental Status: He is alert. Mental status is at baseline.   Psychiatric:         Mood and Affect: Mood normal.         Thought Content: Thought content normal.       Labs:  Recent Results (from the past 24 hour(s))   Comprehensive metabolic panel    Collection Time: 11/20/22  2:51 PM   Result Value Ref Range    Sodium Level 138 136 - 145 mmol/L    Potassium Level 4.3 3.5 - 5.1 mmol/L    Chloride 104 98 - 107 mmol/L    Carbon Dioxide 21 (L) " 23 - 31 mmol/L    Glucose Level 145 (H) 82 - 115 mg/dL    Blood Urea Nitrogen 19.4 8.4 - 25.7 mg/dL    Creatinine 1.10 0.73 - 1.18 mg/dL    Calcium Level Total 9.3 8.8 - 10.0 mg/dL    Protein Total 7.1 5.8 - 7.6 gm/dL    Albumin Level 3.0 (L) 3.4 - 4.8 gm/dL    Globulin 4.1 (H) 2.4 - 3.5 gm/dL    Albumin/Globulin Ratio 0.7 (L) 1.1 - 2.0 ratio    Bilirubin Total 0.9 <=1.5 mg/dL    Alkaline Phosphatase 119 40 - 150 unit/L    Alanine Aminotransferase 11 0 - 55 unit/L    Aspartate Aminotransferase 23 5 - 34 unit/L    eGFR >60 mls/min/1.73/m2   Lactic acid, plasma    Collection Time: 11/20/22  2:51 PM   Result Value Ref Range    Lactic Acid Level 3.3 (H) 0.5 - 2.2 mmol/L   Troponin I    Collection Time: 11/20/22  2:51 PM   Result Value Ref Range    Troponin-I 0.059 (H) 0.000 - 0.045 ng/mL   Brain natriuretic peptide    Collection Time: 11/20/22  2:51 PM   Result Value Ref Range    Natriuretic Peptide 65.3 <=100.0 pg/mL   Protime-INR    Collection Time: 11/20/22  2:51 PM   Result Value Ref Range    PT 15.4 (H) 12.5 - 14.5 seconds    INR 1.23 0.00 - 1.30   APTT    Collection Time: 11/20/22  2:51 PM   Result Value Ref Range    PTT 30.2 23.2 - 33.7 seconds   CBC with Differential    Collection Time: 11/20/22  3:46 PM   Result Value Ref Range    WBC 26.1 (H) 4.5 - 11.5 x10(3)/mcL    RBC 2.60 (L) 4.70 - 6.10 x10(6)/mcL    Hgb 8.2 (L) 14.0 - 18.0 gm/dL    Hct 25.4 (L) 42.0 - 52.0 %    MCV 97.7 (H) 80.0 - 94.0 fL    MCH 31.5 (H) 27.0 - 31.0 pg    MCHC 32.3 (L) 33.0 - 36.0 mg/dL    RDW 15.9 11.5 - 17.0 %    Platelet 367 130 - 400 x10(3)/mcL    MPV 8.5 7.4 - 10.4 fL    IG# 0.22 (H) 0 - 0.04 x10(3)/mcL    IG% 0.8 %    NRBC% 0.0 %   Manual Differential    Collection Time: 11/20/22  3:46 PM   Result Value Ref Range    Neut Man 91 %    Lymph Man 4 %    Monocyte Man 4 %    Eos Man 1 %    Instr WBC 26.1 x10(3)/mcL    Abs Mono 1.044 0.1 - 1.3 x10(3)/mcL    Abs Eos  0.261 0 - 0.9 x10(3)/mcL    Abs Lymp 1.044 0.6 - 4.6 x10(3)/mcL    Abs  Neut 23.751 (H) 2.1 - 9.2 x10(3)/mcL    RBC Morph Abnormal (A) Normal    Anisocyte 1+ (A) (none)    Macrocyte 1+ (A) (none)    Platelet Est Normal Normal, Adequate    Giant Platelets 1+    Urinalysis, Reflex to Urine Culture Urine, Clean Catch    Collection Time: 11/20/22  6:26 PM    Specimen: Urine   Result Value Ref Range    Color, UA Dark Yellow Yellow, Light-Yellow, Dark Yellow, Zaina, Straw    Appearance, UA Clear Clear    Specific Gravity, UA 1.019 1.001 - 1.030    pH, UA 5.5 5.0 - 8.5    Protein, UA Trace (A) Negative mg/dL    Glucose, UA Negative Negative, Normal mg/dL    Ketones, UA Negative Negative mg/dL    Blood, UA Negative Negative unit/L    Bilirubin, UA Negative Negative mg/dL    Urobilinogen, UA 1.0 0.2, 1.0, Normal mg/dL    Nitrites, UA Negative Negative    Leukocyte Esterase, UA Negative Negative unit/L   COVID/FLU A&B PCR    Collection Time: 11/20/22  6:26 PM   Result Value Ref Range    Influenza A PCR Not Detected Not Detected    Influenza B PCR Not Detected Not Detected    SARS-CoV-2 PCR Not Detected Not Detected   Urinalysis, Microscopic    Collection Time: 11/20/22  6:26 PM   Result Value Ref Range    RBC, UA <5 <=5 /HPF    WBC, UA <5 <=5 /HPF    Squamous Epithelial Cells, UA <5 <=5 /HPF    Bacteria, UA None Seen None Seen, Rare, Occasional /HPF   Comprehensive Metabolic Panel    Collection Time: 11/21/22  5:05 AM   Result Value Ref Range    Sodium Level 132 (L) 136 - 145 mmol/L    Potassium Level 3.9 3.5 - 5.1 mmol/L    Chloride 103 98 - 107 mmol/L    Carbon Dioxide 20 (L) 23 - 31 mmol/L    Glucose Level 102 82 - 115 mg/dL    Blood Urea Nitrogen 14.9 8.4 - 25.7 mg/dL    Creatinine 0.73 0.73 - 1.18 mg/dL    Calcium Level Total 8.3 (L) 8.8 - 10.0 mg/dL    Protein Total 5.1 (L) 5.8 - 7.6 gm/dL    Albumin Level 2.2 (L) 3.4 - 4.8 gm/dL    Globulin 2.9 2.4 - 3.5 gm/dL    Albumin/Globulin Ratio 0.8 (L) 1.1 - 2.0 ratio    Bilirubin Total 0.6 <=1.5 mg/dL    Alkaline Phosphatase 82 40 - 150 unit/L     Alanine Aminotransferase 7 0 - 55 unit/L    Aspartate Aminotransferase 16 5 - 34 unit/L    eGFR >60 mls/min/1.73/m2   Magnesium    Collection Time: 11/21/22  5:05 AM   Result Value Ref Range    Magnesium Level 1.70 1.60 - 2.60 mg/dL   Phosphorus    Collection Time: 11/21/22  5:05 AM   Result Value Ref Range    Phosphorus Level 2.3 2.3 - 4.7 mg/dL   Type & Screen    Collection Time: 11/21/22  5:05 AM   Result Value Ref Range    Group & Rh AB NEG     Indirect Amirah GEL NEG    CBC with Differential    Collection Time: 11/21/22  5:05 AM   Result Value Ref Range    WBC 20.5 (H) 4.5 - 11.5 x10(3)/mcL    RBC 2.07 (L) 4.70 - 6.10 x10(6)/mcL    Hgb 6.7 (L) 14.0 - 18.0 gm/dL    Hct 20.5 (L) 42.0 - 52.0 %    MCV 99.0 (H) 80.0 - 94.0 fL    MCH 32.4 (H) 27.0 - 31.0 pg    MCHC 32.7 (L) 33.0 - 36.0 mg/dL    RDW 15.9 11.5 - 17.0 %    Platelet 315 130 - 400 x10(3)/mcL    MPV 8.7 7.4 - 10.4 fL    Neut % 80.9 %    Lymph % 8.4 %    Mono % 7.4 %    Eos % 2.3 %    Basophil % 0.3 %    Lymph # 1.73 0.6 - 4.6 x10(3)/mcL    Neut # 16.6 (H) 2.1 - 9.2 x10(3)/mcL    Mono # 1.53 (H) 0.1 - 1.3 x10(3)/mcL    Eos # 0.48 0 - 0.9 x10(3)/mcL    Baso # 0.07 0 - 0.2 x10(3)/mcL    IG# 0.14 (H) 0 - 0.04 x10(3)/mcL    IG% 0.7 %    NRBC% 0.0 %   Troponin I    Collection Time: 11/21/22  5:05 AM   Result Value Ref Range    Troponin-I 0.038 0.000 - 0.045 ng/mL   Prepare RBC 2 Units; 2    Collection Time: 11/21/22  5:05 AM   Result Value Ref Range    UNIT NUMBER [id number]     UNIT ABO/RH AB NEG     DISPENSE STATUS Issued     Unit Expiration [id number]     Product Code L5311C96     Unit Blood Type Code 2800     CROSSMATCH INTERPRETATION Compatible     UNIT NUMBER [id number]     UNIT ABO/RH AB NEG     DISPENSE STATUS Selected     Unit Expiration [id number]     Product Code E9252E87     Unit Blood Type Code 2800     CROSSMATCH INTERPRETATION Compatible        CT Abdomen Pelvis With Contrast   Final Result      Unchanged appearance of the masslike  lesion likely arising from the tail the pancreas with extension into the lesser sac as described above.  Known diffuse metastatic disease is unchanged.         Electronically signed by: Bari Cody MD   Date:    11/20/2022   Time:    17:55      X-Ray Chest AP Portable   Final Result      CT Biopsy Abd Retro Mass    (Results Pending)       Assessment/Plan:  1. Weakness    2. Tachycardia    3. Sepsis, due to unspecified organism, unspecified whether acute organ dysfunction present    4. Metastatic malignant neoplasm, unspecified site    5. Sepsis    6. Chest pain      Patient is an 82 year old male with Hx CAD/PCI on aspirin and Brilinta, and HTN who presented to the ED with fatigue/weakness and dark stools. Patient was recently admitted with GI bleeding, persistent leukocytosis of unclear etiology with CT imaging noted to have LUQ abdominal mass that appeared to be arising from the tail of the pancreas with extension into the lesser sac and finding of diffuse metastatic disease. He was discharged given no IR availability last week and was planned to follow up with IR today 11/21/2022 for a CT-guided biopsy    LUQ mass(likely pancreatic in origin) w/Liver Mets, on CT  -s/p EGD 11/14 with normal-appearing upper mid and lower esophagus. Showing Extrinsic mass stomach possibly of pancreatic origin (as per CT findings). Repeat on 11/20 showing unchanged appearance   - Scheduled for IR biopsy tomorrow, 11/22    2. Anemia, stable   - H&H 8.2/25.4-->6.7/20.5--with plans to receive 2u PRBC  - Continue to monitor and transfuse as needed   - Patient with dark stools throughout his previous admit. At that time, EGD was done (11/14) negative for any acute sources of GI bleeding. Following discharge, these symptoms never resolved, but have not worsened in nature. No plans for immediate endoscopy today given recent negative EGD and we will continue to closely monitor     3. Leukocytosis  - WBC 20.5, on Catalina Perales  BAYRON  Gastroenertology  [facility]     Thank you for allowing us to participate in the care of Bari Vallecillo.

## 2022-11-21 NOTE — PROGRESS NOTES
[facility] Hospital Medicine Progress Note        Chief Complaint: Inpatient Follow-up for severe anemia     HPI:   This is an 82-year-old male medical history of CAD/PCI on aspirin and Brilinta, hypertension, and recently admitted with GI bleeding, persistent leukocytosis of unclear etiology, CT imaging and noted to have left upper quadrant abdominal mass that appeared to be arising from the tail of the pancreas with extension into the lesser sac and finding of diffuse metastatic disease, he was discharged given no IR availability last week and was planned to follow up with IR tomorrow Monday 11/21/2022 for a CT-guided biopsy.     Present to the ED today complaining of fatigue and weakness that started the night prior, report he continued to have dark stool since his discharge from the hospital and intermittent abdominal pain/cramps.  Today he was febrile on arrival with temp 102.4F otherwise hemodynamically stable.  Labs notable for hemoglobin 8.2, 3 days ago was 8.6.  Chest x-ray show no acute finding.  And repeat CT abdomen pelvis show stable malignancy findings.     He was given antipyretics, IV fluids, and Zosyn and vancomycin and referred to hospital medicine service for further evaluation and management.  Interval Hx:   Patient awake and comfortable. Has generalized weakness and mild abdominal pain. No fever, chills or chest pain. Family at bedside. Explained in detail about the treatment plan.     Objective/physical exam:  General: In no acute distress, afebrile  Chest: Clear to auscultation bilaterally  Heart: RRR, +S1, S2, no appreciable murmur  Abdomen: Soft, nontender, BS +  MSK: Warm, no lower extremity edema, no clubbing or cyanosis  Neurologic: Alert and oriented x4, Cranial nerve II-XII intact, Strength 5/5 in all 4 extremities    VITAL SIGNS: 24 HRS MIN & MAX LAST   Temp  Min: 97.7 °F (36.5 °C)  Max: 98.7 °F (37.1 °C) 98.6 °F (37 °C)   BP  Min: 113/60  Max: 144/73  134/65   Pulse  Min: 59  Max: 78  69   Resp  Min: 9  Max: 22 19   SpO2  Min: 92 %  Max: 97 % 96 %       Recent Labs   Lab 11/17/22  0408 11/20/22  1546 11/21/22  0505   WBC 26.8* 26.1* 20.5*   RBC 2.67* 2.60* 2.07*   HGB 8.6* 8.2* 6.7*   HCT 26.1* 25.4* 20.5*   MCV 97.8* 97.7* 99.0*   MCH 32.2* 31.5* 32.4*   MCHC 33.0 32.3* 32.7*   RDW 16.8 15.9 15.9    367 315   MPV 8.4 8.5 8.7       Recent Labs   Lab 11/16/22  0337 11/17/22  0408 11/20/22  1451 11/21/22  0505    135* 138 132*   K 4.1 4.1 4.3 3.9   CO2 25 26 21* 20*   BUN 19.3 12.8 19.4 14.9   CREATININE 1.07 1.11 1.10 0.73   CALCIUM 8.3* 8.7* 9.3 8.3*   MG 1.90 1.90  --  1.70   ALBUMIN 2.7* 2.6* 3.0* 2.2*   ALKPHOS 109 106 119 82   ALT 9 11 11 7   AST 23 22 23 16   BILITOT 1.1 0.7 0.9 0.6          Microbiology Results (last 7 days)       Procedure Component Value Units Date/Time    Blood culture x two cultures. Draw prior to antibiotics. [[id number]] Collected: 11/20/22 1450    Order Status: Resulted Specimen: Blood Updated: 11/20/22 1507    Blood culture x two cultures. Draw prior to antibiotics. [[id number]] Collected: 11/20/22 1454    Order Status: Resulted Specimen: Blood Updated: 11/20/22 1454             See below for Radiology    Scheduled Med:   allopurinoL  300 mg Oral Daily    atorvastatin  20 mg Oral Daily    lisinopriL  40 mg Oral Daily    pantoprazole  40 mg Oral Daily    piperacillin-tazobactam (ZOSYN) IVPB  4.5 g Intravenous Q8H    ranolazine  500 mg Oral BID        Continuous Infusions:       PRN Meds:  sodium chloride, acetaminophen, acetaminophen, aluminum-magnesium hydroxide-simethicone, melatonin, morphine, ondansetron, oxyCODONE-acetaminophen, polyethylene glycol, prochlorperazine, senna-docusate 8.6-50 mg, simethicone, sodium chloride 0.9%       Assessment/Plan:  SIRS--- probably neoplastic fever  Severe anemia   Metastatic malignancy of unknown primary  LUQ mass appeared to be arising from the pancreas  Acute on chronic blood-loss  anemia     HX CAD/stents, hypertension, hyperlipidemia, GERD     Plan:  Patient feels weak but no other issues  His Hb Is <7 and will be transfused 2 units of PRBC today   Consult GI team for colonoscopy  Biopsy of mass in am by IR team   Monitor H&H closely   Will cont iv zosyn for now, if cultures negative will stop in 24-48 hrs   Cont supportive care     Discussed about goals of care with patient and family. Patient wants to be treated for his cancer. He is functional and has good performance/ nutritional status.     Critical care note:  Critical care diagnosis: Anemia needing blood transfusion   Critical care interventions: Hands-on evaluation, review of labs/radiographs/records and discussion with patient and family if present  Critical care time spent: 35 minutes     VTE prophylaxis: SCDS    Patient condition:  Guarded    Anticipated discharge and Disposition:         All diagnosis and differential diagnosis have been reviewed; assessment and plan has been documented; I have personally reviewed the labs and test results that are presently available; I have reviewed the patients medication list; I have reviewed the consulting providers response and recommendations. I have reviewed or attempted to review medical records based upon their availability    All of the patient's questions have been  addressed and answered. Patient's is agreeable to the above stated plan. I will continue to monitor closely and make adjustments to medical management as needed.  _____________________________________________________________________    Nutrition Status:    Radiology:  CT Abdomen Pelvis With Contrast  Narrative: EXAMINATION:  CT ABDOMEN PELVIS WITH CONTRAST    CLINICAL HISTORY:  Abdominal abscess/infection suspected;Abdominal pain, acute, nonlocalized;    TECHNIQUE:  Multiple cross-sectional images of the abdomen and pelvis were obtained after the intravenous administration of contrast.  Coronal and sagittal reformatted  images obtained.  An automated dose exposure technique was utilized.  This limits radiation does the patient.    COMPARISON:  11/16/2022    FINDINGS:  Dependent atelectatic changes lungs with small to moderate effusion on the left.  Known lesion within the right lung base is also unchanged.  Heart size within normal limits with coronary artery calcifications.  Cardiophrenic adenopathy again noted.    Likely aggressive lesion arising from the tail the pancreas and invading the spleen.  The overall appearance is not significantly changed also the masslike lesion extends into the lesser sac and exerts mass effect on the gastric body.  This is also unchanged.  Metastatic lymph nodes in the lesser sac encasing the superior mesenteric vein/portal vein not significantly changed.  At least 2 liver metastases unchanged..  The adrenal glands and kidneys are grossly normal.  Bosniak type 1 cyst involving the left kidney.    Mass effect upon the gastric body which is unchanged.  Small bowel is of normal caliber.  Colon is also normal caliber with scattered colonic diverticuli.  Normal appendix with appendicoliths at the tip versus metallic density.    Prostate is enlarged.  Bladder is under distended.  The course and caliber of the abdominal aorta is normal with scattered calcified atheromatous disease.  No free fluid in the abdomen pelvis.  Adenopathy as above.  No definitive peritoneal seeding is identified.    Scattered sclerotic lesions are identified of the lumbar spine.  Spondylotic changes are identified.  These are unchanged.  Soft tissues are grossly normal with small ventral hernia.  Metallic foreign body is identified in the right inguinal region.  Impression: Unchanged appearance of the masslike lesion likely arising from the tail the pancreas with extension into the lesser sac as described above.  Known diffuse metastatic disease is unchanged.    Electronically signed by: Bari Cody  MD  Date:    11/20/2022  Time:    17:55  X-Ray Chest AP Portable  EXAMINATION  XR CHEST AP PORTABLE    CLINICAL HISTORY  Sepsis;    TECHNIQUE  A total of 1 frontal view(s) of the chest.    COMPARISON  11 November 2022    FINDINGS  Lines/tubes/devices: ECG leads overlie the imaged region.    The cardiac silhouette and central vascular structures are unchanged.  The trachea is midline. No new or worsening consolidation is identified. There is no enlarging pleural effusion or convincing pneumothorax.    Regional osseous structures and extrathoracic soft tissues are similar.    IMPRESSION  No significant interval change.    Electronically signed by: Louis Alex  Date:    11/20/2022  Time:    15:04      Dominic Nowak MD   11/21/2022

## 2022-11-21 NOTE — H&P
[facility] Hospital Medicine   History & Physical Note      Patient Name: Bari Vallecillo  : 1940  MRN: [id number]  PCP: Primary Doctor No  Admitting Service: Hospital Medicine  Attending Physician: Eleazar Ovalles MD  Admission Date: 2022 - IP- Inpatient   Length of Stay: 0  History source: EMR, patient and/or patient's family  Face-to-Face encounter date: 2022  Code status: Full    Chief Complaint   Fatigue and generalized weakness    History of Present Illness   This is an 82-year-old male medical history of CAD/PCI on aspirin and Brilinta, hypertension, and recently admitted with GI bleeding, persistent leukocytosis of unclear etiology, CT imaging and noted to have left upper quadrant abdominal mass that appeared to be arising from the tail of the pancreas with extension into the lesser sac and finding of diffuse metastatic disease, he was discharged given no IR availability last week and was planned to follow up with IR tomorrow 2022 for a CT-guided biopsy.    Present to the ED today complaining of fatigue and weakness that started the night prior, report he continued to have dark stool since his discharge from the hospital and intermittent abdominal pain/cramps.  Today he was febrile on arrival with temp 102.4F otherwise hemodynamically stable.  Labs notable for hemoglobin 8.2, 3 days ago was 8.6.  Chest x-ray show no acute finding.  And repeat CT abdomen pelvis show stable malignancy findings.    He was given antipyretics, IV fluids, and Zosyn and vancomycin and referred to hospital medicine service for further evaluation and management.    ROS   Except as documented, all other systems reviewed and negative     Past Medical History   CAD/stent on aspirin and Brilinta  Hypertension  Hyperlipidemia  Hyperuricemia    Past Surgical History     Past Surgical History:   Procedure Laterality Date    ESOPHAGOGASTRODUODENOSCOPY N/A 2022    Procedure: EGD  (ESOPHAGOGASTRODUODENOSCOPY);  Surgeon: SOULEYMANE Nunn MD;  Location: [facility] ENDOSCOPY;  Service: Gastroenterology;  Laterality: N/A;    JOINT REPLACEMENT         Social History     Social History     Tobacco Use    Smoking status: Never    Smokeless tobacco: Never   Substance Use Topics    Alcohol use: Not Currently        Family History   Reviewed and negative    Allergies   Patient has no known allergies.    Home Medications     Prior to Admission medications    Medication Sig Start Date End Date Taking? Authorizing Provider   allopurinoL (ZYLOPRIM) 300 MG tablet Take 1 tablet by mouth once daily. 2/24/22  Yes Historical Provider   aspirin (ECOTRIN) 81 MG EC tablet Take 81 mg by mouth once daily. 2/24/22  Yes Historical Provider   pantoprazole (PROTONIX) 40 MG tablet Take 1 tablet (40 mg total) by mouth once daily. 11/17/22 11/17/23 Yes Koffi Kellogg MD   pantoprazole (PROTONIX) 40 MG tablet Take 1 tablet by mouth once daily. 2/24/22  Yes Historical Provider   quinapriL (ACCUPRIL) 40 MG tablet Take 1 tablet by mouth once daily. 2/24/22  Yes Historical Provider   ranolazine (RANEXA) 500 MG Tb12 Take 1 tablet by mouth 2 (two) times a day. 2/24/22  Yes Historical Provider   BRILINTA 60 mg tablet Take 60 mg by mouth 2 (two) times daily. 10/13/22   Historical Provider   ergocalciferol (VITAMIN D2) 50,000 unit Cap Take 1 capsule by mouth once a week.    Historical Provider   GENTLE LAXATIVE, BISACODYL, 5 mg EC tablet Take by mouth once daily. 9/16/22   Historical Provider   nitroGLYCERIN (NITROSTAT) 0.4 MG SL tablet Place 0.4 mg under the tongue. 2/24/22   Historical Provider   rosuvastatin (CRESTOR) 5 MG tablet Take 1 tablet by mouth once daily.    Historical Provider   allopurinoL (ZYLOPRIM) 300 MG tablet Take 300 mg by mouth once daily. 9/9/22 11/20/22  Historical Provider   amLODIPine (NORVASC) 5 MG tablet Take 5 mg by mouth once daily. 7/19/22 11/20/22  Historical Provider   aspirin (ECOTRIN) 81 MG  EC tablet Take 81 mg by mouth once daily. 10/16/22 11/20/22  Historical Provider   ergocalciferol (ERGOCALCIFEROL) 50,000 unit Cap Take 50,000 Units by mouth every Sunday. 10/28/22 11/20/22  Historical Provider   quinapriL (ACCUPRIL) 40 MG tablet Take 40 mg by mouth once daily. 10/8/22 11/20/22  Historical Provider   ranolazine (RANEXA) 500 MG Tb12 Take 500 mg by mouth 2 (two) times daily. 10/16/22 11/20/22  Historical Provider   rosuvastatin (CRESTOR) 5 MG tablet Take 5 mg by mouth once daily. 9/18/22 11/20/22  Historical Provider        Physical Exam   Vital Signs  Temp:  [98.7 °F (37.1 °C)-102.4 °F (39.1 °C)]   Pulse:  []   Resp:  [14-21]   BP: (113-156)/(57-74)   SpO2:  [93 %-98 %]    General: Appears comfortable  HEENT: NC/AT  Neck:  No JVD  Chest: CTABL  CVS: Regular rhythm. Normal S1/S2.  Abdomen: nondistended, normoactive BS, soft and non-tender.  MSK: No obvious deformity or joint swelling  Skin: Warm and dry  Neuro: AAOx3, no focal neurological deficit  Psych: Cooperative    Labs     Recent Labs     11/20/22  1546   WBC 26.1*   RBC 2.60*   HGB 8.2*   HCT 25.4*   MCV 97.7*   MCH 31.5*   MCHC 32.3*   RDW 15.9        Recent Labs     11/20/22  1451   LACTIC 3.3*     Recent Labs     11/20/22  1451   INR 1.23        Recent Labs     11/20/22  1451      K 4.3   CHLORIDE 104   CO2 21*   BUN 19.4   CREATININE 1.10   GLUCOSE 145*   CALCIUM 9.3   ALBUMIN 3.0*   GLOBULIN 4.1*   ALKPHOS 119   ALT 11   AST 23   BILITOT 0.9     Recent Labs     11/20/22  1451   BNP 65.3   TROPONINI 0.059*          Microbiology Results (last 7 days)       Procedure Component Value Units Date/Time    Blood culture x two cultures. Draw prior to antibiotics. [[id number]] Collected: 11/20/22 1450    Order Status: Resulted Specimen: Blood Updated: 11/20/22 1507    Blood culture x two cultures. Draw prior to antibiotics. [[id number]] Collected: 11/20/22 1454    Order Status: Resulted Specimen: Blood Updated: 11/20/22 1454            Imaging     CT Abdomen Pelvis With Contrast   Final Result      Unchanged appearance of the masslike lesion likely arising from the tail the pancreas with extension into the lesser sac as described above.  Known diffuse metastatic disease is unchanged.         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  IR consult for biopsy  Hold aspirin and Brilinta  VTE Prophylaxis:  SCDs    Critical care time:  35 minutes  Critical care diagnosis: SIRS, anemia    Eleazar Ovalles MD  Internal Medicine

## 2022-11-21 NOTE — PLAN OF CARE
11/21/22 1356   Discharge Assessment   Assessment Type Discharge Planning Assessment   Confirmed/corrected address, phone number and insurance Yes   Confirmed Demographics Correct on Facesheet   Source of Information patient   Communicated TELLY with patient/caregiver Date not available/Unable to determine   Lives With alone   Do you expect to return to your current living situation? Yes   Do you have help at home or someone to help you manage your care at home? No   Prior to hospitilization cognitive status: Alert/Oriented   Current cognitive status: Alert/Oriented   Walking or Climbing Stairs Difficulty none   Dressing/Bathing Difficulty none   Home Accessibility stairs to enter home   Number of Stairs, Main Entrance four   Equipment Currently Used at Home none   Readmission within 30 days? Yes   Patient currently being followed by outpatient case management? No   Do you currently have service(s) that help you manage your care at home? No   Do you take prescription medications? Yes   Do you have prescription coverage? Yes   Do you have any problems affording any of your prescribed medications? No   How do you get to doctors appointments? car, drives self   Discharge Plan A Home Health   Discharge Plan B Home   DME Needed Upon Discharge  none   Discharge Plan discussed with: Patient   Discharge Barriers Identified None

## 2022-11-22 LAB
ANION GAP SERPL CALC-SCNC: 12 MEQ/L
BASOPHILS # BLD AUTO: 0.11 X10(3)/MCL (ref 0–0.2)
BASOPHILS NFR BLD AUTO: 0.5 %
BUN SERPL-MCNC: 15 MG/DL (ref 8.4–25.7)
CALCIUM SERPL-MCNC: 8.8 MG/DL (ref 8.8–10)
CHLORIDE SERPL-SCNC: 105 MMOL/L (ref 98–107)
CO2 SERPL-SCNC: 21 MMOL/L (ref 23–31)
CREAT SERPL-MCNC: 0.85 MG/DL (ref 0.73–1.18)
CREAT/UREA NIT SERPL: 18
EOSINOPHIL # BLD AUTO: 0.68 X10(3)/MCL (ref 0–0.9)
EOSINOPHIL NFR BLD AUTO: 2.8 %
ERYTHROCYTE [DISTWIDTH] IN BLOOD BY AUTOMATED COUNT: 17.6 % (ref 11.5–17)
GFR SERPLBLD CREATININE-BSD FMLA CKD-EPI: >60 MLS/MIN/1.73/M2
GLUCOSE SERPL-MCNC: 103 MG/DL (ref 82–115)
HCT VFR BLD AUTO: 28.4 % (ref 42–52)
HGB BLD-MCNC: 9.1 GM/DL (ref 14–18)
IMM GRANULOCYTES # BLD AUTO: 0.23 X10(3)/MCL (ref 0–0.04)
IMM GRANULOCYTES NFR BLD AUTO: 1 %
LYMPHOCYTES # BLD AUTO: 2.03 X10(3)/MCL (ref 0.6–4.6)
LYMPHOCYTES NFR BLD AUTO: 8.4 %
MCH RBC QN AUTO: 30.7 PG (ref 27–31)
MCHC RBC AUTO-ENTMCNC: 32 MG/DL (ref 33–36)
MCV RBC AUTO: 95.9 FL (ref 80–94)
MONOCYTES # BLD AUTO: 1.72 X10(3)/MCL (ref 0.1–1.3)
MONOCYTES NFR BLD AUTO: 7.1 %
NEUTROPHILS # BLD AUTO: 19.4 X10(3)/MCL (ref 2.1–9.2)
NEUTROPHILS NFR BLD AUTO: 80.2 %
NRBC BLD AUTO-RTO: 0 %
PLATELET # BLD AUTO: 366 X10(3)/MCL (ref 130–400)
PMV BLD AUTO: 9.1 FL (ref 7.4–10.4)
POTASSIUM SERPL-SCNC: 4.2 MMOL/L (ref 3.5–5.1)
RBC # BLD AUTO: 2.96 X10(6)/MCL (ref 4.7–6.1)
SODIUM SERPL-SCNC: 138 MMOL/L (ref 136–145)
WBC # SPEC AUTO: 24.2 X10(3)/MCL (ref 4.5–11.5)

## 2022-11-22 PROCEDURE — 63600175 PHARM REV CODE 636 W HCPCS: Performed by: INTERNAL MEDICINE

## 2022-11-22 PROCEDURE — 88305 TISSUE EXAM BY PATHOLOGIST: CPT | Performed by: INTERNAL MEDICINE

## 2022-11-22 PROCEDURE — 25000003 PHARM REV CODE 250: Performed by: RADIOLOGY

## 2022-11-22 PROCEDURE — 36415 COLL VENOUS BLD VENIPUNCTURE: CPT | Performed by: INTERNAL MEDICINE

## 2022-11-22 PROCEDURE — 99223 PR INITIAL HOSPITAL CARE,LEVL III: ICD-10-PCS | Mod: ,,, | Performed by: INTERNAL MEDICINE

## 2022-11-22 PROCEDURE — 85025 COMPLETE CBC W/AUTO DIFF WBC: CPT | Performed by: INTERNAL MEDICINE

## 2022-11-22 PROCEDURE — 25000003 PHARM REV CODE 250: Performed by: INTERNAL MEDICINE

## 2022-11-22 PROCEDURE — 21400001 HC TELEMETRY ROOM

## 2022-11-22 PROCEDURE — 99223 1ST HOSP IP/OBS HIGH 75: CPT | Mod: ,,, | Performed by: INTERNAL MEDICINE

## 2022-11-22 PROCEDURE — 63600175 PHARM REV CODE 636 W HCPCS: Performed by: RADIOLOGY

## 2022-11-22 PROCEDURE — 80048 BASIC METABOLIC PNL TOTAL CA: CPT | Performed by: INTERNAL MEDICINE

## 2022-11-22 RX ORDER — FENTANYL CITRATE 50 UG/ML
INJECTION, SOLUTION INTRAMUSCULAR; INTRAVENOUS
Status: DISPENSED
Start: 2022-11-22 | End: 2022-11-22

## 2022-11-22 RX ORDER — DOCUSATE SODIUM 100 MG/1
100 CAPSULE, LIQUID FILLED ORAL 2 TIMES DAILY
Status: DISCONTINUED | OUTPATIENT
Start: 2022-11-22 | End: 2022-11-24 | Stop reason: HOSPADM

## 2022-11-22 RX ORDER — POLYETHYLENE GLYCOL 3350 17 G/17G
17 POWDER, FOR SOLUTION ORAL DAILY
Status: DISCONTINUED | OUTPATIENT
Start: 2022-11-22 | End: 2022-11-24

## 2022-11-22 RX ORDER — LIDOCAINE HYDROCHLORIDE 20 MG/ML
INJECTION, SOLUTION EPIDURAL; INFILTRATION; INTRACAUDAL; PERINEURAL
Status: DISPENSED
Start: 2022-11-22 | End: 2022-11-22

## 2022-11-22 RX ORDER — SODIUM, POTASSIUM,MAG SULFATES 17.5-3.13G
1 SOLUTION, RECONSTITUTED, ORAL ORAL ONCE
Status: COMPLETED | OUTPATIENT
Start: 2022-11-22 | End: 2022-11-23

## 2022-11-22 RX ORDER — FENTANYL CITRATE 50 UG/ML
INJECTION, SOLUTION INTRAMUSCULAR; INTRAVENOUS
Status: COMPLETED | OUTPATIENT
Start: 2022-11-22 | End: 2022-11-22

## 2022-11-22 RX ORDER — LIDOCAINE HYDROCHLORIDE 20 MG/ML
INJECTION, SOLUTION INFILTRATION; PERINEURAL
Status: COMPLETED | OUTPATIENT
Start: 2022-11-22 | End: 2022-11-22

## 2022-11-22 RX ORDER — MIDAZOLAM HYDROCHLORIDE 1 MG/ML
INJECTION INTRAMUSCULAR; INTRAVENOUS
Status: COMPLETED | OUTPATIENT
Start: 2022-11-22 | End: 2022-11-22

## 2022-11-22 RX ORDER — MIDAZOLAM HYDROCHLORIDE 1 MG/ML
INJECTION INTRAMUSCULAR; INTRAVENOUS
Status: DISPENSED
Start: 2022-11-22 | End: 2022-11-22

## 2022-11-22 RX ADMIN — RANOLAZINE 500 MG: 500 TABLET, EXTENDED RELEASE ORAL at 09:11

## 2022-11-22 RX ADMIN — DOCUSATE SODIUM 100 MG: 100 CAPSULE, LIQUID FILLED ORAL at 02:11

## 2022-11-22 RX ADMIN — LIDOCAINE HYDROCHLORIDE 5 ML: 20 INJECTION, SOLUTION INFILTRATION; PERINEURAL at 11:11

## 2022-11-22 RX ADMIN — PIPERACILLIN AND TAZOBACTAM 4.5 G: 4; .5 INJECTION, POWDER, LYOPHILIZED, FOR SOLUTION INTRAVENOUS; PARENTERAL at 09:11

## 2022-11-22 RX ADMIN — MIDAZOLAM HYDROCHLORIDE 1 MG: 1 INJECTION, SOLUTION INTRAMUSCULAR; INTRAVENOUS at 11:11

## 2022-11-22 RX ADMIN — LISINOPRIL 40 MG: 20 TABLET ORAL at 09:11

## 2022-11-22 RX ADMIN — DOCUSATE SODIUM 100 MG: 100 CAPSULE, LIQUID FILLED ORAL at 08:11

## 2022-11-22 RX ADMIN — ATORVASTATIN CALCIUM 20 MG: 10 TABLET, FILM COATED ORAL at 09:11

## 2022-11-22 RX ADMIN — FENTANYL CITRATE 25 MCG: 50 INJECTION, SOLUTION INTRAMUSCULAR; INTRAVENOUS at 11:11

## 2022-11-22 RX ADMIN — PIPERACILLIN AND TAZOBACTAM 4.5 G: 4; .5 INJECTION, POWDER, LYOPHILIZED, FOR SOLUTION INTRAVENOUS; PARENTERAL at 02:11

## 2022-11-22 RX ADMIN — RANOLAZINE 500 MG: 500 TABLET, EXTENDED RELEASE ORAL at 08:11

## 2022-11-22 RX ADMIN — POLYETHYLENE GLYCOL 3350 17 G: 17 POWDER, FOR SOLUTION ORAL at 02:11

## 2022-11-22 RX ADMIN — PANTOPRAZOLE SODIUM 40 MG: 40 TABLET, DELAYED RELEASE ORAL at 09:11

## 2022-11-22 RX ADMIN — ALLOPURINOL 300 MG: 300 TABLET ORAL at 09:11

## 2022-11-22 RX ADMIN — SODIUM SULFATE, POTASSIUM SULFATE, MAGNESIUM SULFATE 354 ML: 17.5; 3.13; 1.6 SOLUTION, CONCENTRATE ORAL at 11:11

## 2022-11-22 NOTE — CONSULTS
Subjective:       Patient ID: Bari Vallecillo is a 82 y.o. male.    Chief Complaint: Fatigue (Patient has been complaining of weakness.  Pt recently diagnosed with a mass in his abdomen with possible other areas, but was unable to have a biopsy done yet.  Pt had blood transfusion done earlier this week. 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    Current Treatment:   Pending workup and diagnosis    Treatment History:  N/A    HPI:  82-year-old male with a history of coronary artery disease underwent an EGD and colonoscopy in September of 2022 for a workup of abdominal pain, these were unremarkable.  On 11/11/2022, the patient was having persistent abdominal pain and had outpatient labs revealed leukocytosis.  Was referred to the emergency department.  He was admitted to the hospitalist service.  A CT of the abdomen and pelvis was obtained on 11/11/2022 and showed an aggressive mass in the left upper quadrant measuring 12 cm x 9 cm.  This involved the tail of the pancreas, the stomach, and the spleen.  There was metastasis seen in the liver.  CA 19 9 and CEA were done on 11/14/2022, CA 19 9 was elevated at 867.48, CEA was elevated at 3.74.  EGD on 11/14/2022 revealed normal esophagus and stomach.  There was an extrinsic mass noted to be compressing the body of the stomach.  A repeat CT of the abdomen and pelvis on 11/16/2022 showed similar findings.  There was presumed metastatic lesions at the right lower lobe, liver, and L1.  The patient was then discharged home on 11/17/2022 with plans for outpatient IR guided biopsy.  Prior to having this biopsy, the patient presented to the emergency department on 11/20/2022 with fatigue and weakness and continued dark stool.  He also was noted to have a fever of 102.4.  Repeat CT of the abdomen and pelvis on 11/20/2022 revealed unchanged appearance of the masslike lesion arising from the tail  of the pancreas with known diffuse metastatic disease being unchanged.  The patient then underwent biopsy of the pancreatic tail mass on 11/22/2022.  Oncology consulted further discussion.  I saw the patient on 11/22/2022.  At that initial consult, the patient stated that he continued to have weakness in dark stool.    Interval History:   Initial consult  Past Medical History:   Diagnosis Date    Coronary artery disease     Sleep apnea       Past Surgical History:   Procedure Laterality Date    ESOPHAGOGASTRODUODENOSCOPY N/A 11/14/2022    Procedure: EGD (ESOPHAGOGASTRODUODENOSCOPY);  Surgeon: SOULEYMANE Nunn MD;  Location: [facility] ENDOSCOPY;  Service: Gastroenterology;  Laterality: N/A;    JOINT REPLACEMENT       Social History     Socioeconomic History    Marital status:    Tobacco Use    Smoking status: Never    Smokeless tobacco: Never   Substance and Sexual Activity    Alcohol use: Not Currently      No family history on file.   Review of patient's allergies indicates:  No Known Allergies   Review of Systems   Constitutional:  Positive for fatigue and fever. Negative for unexpected weight change.   HENT:  Negative for mouth dryness, nosebleeds and sore throat.    Respiratory:  Negative for chest tightness and shortness of breath.    Cardiovascular:  Negative for chest pain and palpitations.   Gastrointestinal:  Positive for abdominal pain and blood in stool. Negative for constipation and diarrhea.   Genitourinary:  Negative for difficulty urinating and dysuria.   Musculoskeletal:  Negative for arthralgias and back pain.   Integumentary:  Negative for rash.   Neurological:  Positive for weakness. Negative for headaches.   Hematological:  Negative for adenopathy.   Psychiatric/Behavioral:  Negative for agitation.        Objective:      Physical Exam  Vitals reviewed.   Constitutional:       General: He is awake.      Appearance: Normal appearance.   HENT:      Head: Normocephalic and atraumatic.       Right Ear: Hearing normal.      Left Ear: Hearing normal.      Nose: Nose normal.   Eyes:      General: Lids are normal. Vision grossly intact.      Extraocular Movements: Extraocular movements intact.      Conjunctiva/sclera: Conjunctivae normal.   Cardiovascular:      Rate and Rhythm: Normal rate and regular rhythm.      Pulses: Normal pulses.      Heart sounds: Normal heart sounds.   Pulmonary:      Effort: Pulmonary effort is normal.      Breath sounds: Normal breath sounds. No wheezing, rhonchi or rales.   Abdominal:      General: Bowel sounds are normal. There is distension.      Palpations: Abdomen is soft.      Tenderness: There is abdominal tenderness.   Musculoskeletal:      Cervical back: Full passive range of motion without pain.      Right lower leg: No edema.      Left lower leg: No edema.   Lymphadenopathy:      Cervical: No cervical adenopathy.      Upper Body:      Right upper body: No supraclavicular or axillary adenopathy.      Left upper body: No supraclavicular or axillary adenopathy.   Skin:     General: Skin is warm.   Neurological:      General: No focal deficit present.      Mental Status: He is alert and oriented to person, place, and time.   Psychiatric:         Attention and Perception: Attention normal.         Mood and Affect: Mood and affect normal.         Behavior: Behavior is cooperative.       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        Plan:       At this time, the patient has findings concerning for a pancreatic versus gastric cancer with metastatic disease.    Patient underwent biopsy on 11/22/2022.    Patient being evaluated and followed by GI.  No plans for endoscopy as he recently had them and they were normal.    We will watch his counts closely, we may need to consider a bone marrow biopsy to rule out any metastatic involvement in the future if we do not see improvement after  transfusion.    Discuss with the patient and his family that we would know more once we got the results of the biopsy.      Trell Munoz II, MD

## 2022-11-22 NOTE — PROGRESS NOTES
[facility] Hospital Medicine Progress Note        Chief Complaint: Inpatient Follow-up for severe anemia     HPI:   This is an 82-year-old male medical history of CAD/PCI on aspirin and Brilinta, hypertension, and recently admitted with GI bleeding, persistent leukocytosis of unclear etiology, CT imaging and noted to have left upper quadrant abdominal mass that appeared to be arising from the tail of the pancreas with extension into the lesser sac and finding of diffuse metastatic disease, he was discharged given no IR availability last week and was planned to follow up with IR tomorrow Monday 11/21/2022 for a CT-guided biopsy.     Present to the ED today complaining of fatigue and weakness that started the night prior, report he continued to have dark stool since his discharge from the hospital and intermittent abdominal pain/cramps.  Today he was febrile on arrival with temp 102.4F otherwise hemodynamically stable.  Labs notable for hemoglobin 8.2, 3 days ago was 8.6.  Chest x-ray show no acute finding.  And repeat CT abdomen pelvis show stable malignancy findings.     He was given antipyretics, IV fluids, and Zosyn and vancomycin and referred to hospital medicine service for further evaluation and management.    Patients Hb dropped to < 7 and was transfused 2 units of PRBC. He was scheduled for a IR guided biopsy of the mass on 11/22/22.   Interval Hx:   Patient awake and comfortable. He has no new issues. Abdominal pain well controlled. He has been constipated for past week. Will start on stool softeners and laxatives. He is scheduled for biopsy of the mass today by IR team.     Objective/physical exam:  General: In no acute distress, afebrile  Chest: Clear to auscultation bilaterally  Heart: RRR, +S1, S2, no appreciable murmur  Abdomen: Soft, Mild tenderness in all quadrants, BS +  MSK: Warm, no lower extremity edema, no clubbing or cyanosis  Neurologic: Alert and oriented x4,  Cranial nerve II-XII intact, Strength 5/5 in all 4 extremities    VITAL SIGNS: 24 HRS MIN & MAX LAST   Temp  Min: 97.4 °F (36.3 °C)  Max: 98.9 °F (37.2 °C) 97.4 °F (36.3 °C)   BP  Min: 124/67  Max: 144/73 (!) 140/84   Pulse  Min: 60  Max: 73  60   Resp  Min: 16  Max: 20 18   SpO2  Min: 92 %  Max: 98 % 95 %       Recent Labs   Lab 11/20/22  1546 11/21/22  0505 11/22/22  0351   WBC 26.1* 20.5* 24.2*   RBC 2.60* 2.07* 2.96*   HGB 8.2* 6.7* 9.1*   HCT 25.4* 20.5* 28.4*   MCV 97.7* 99.0* 95.9*   MCH 31.5* 32.4* 30.7   MCHC 32.3* 32.7* 32.0*   RDW 15.9 15.9 17.6*    315 366   MPV 8.5 8.7 9.1       Recent Labs   Lab 11/16/22  0337 11/17/22  0408 11/20/22  1451 11/21/22  0505 11/22/22  0351    135* 138 132* 138   K 4.1 4.1 4.3 3.9 4.2   CO2 25 26 21* 20* 21*   BUN 19.3 12.8 19.4 14.9 15.0   CREATININE 1.07 1.11 1.10 0.73 0.85   CALCIUM 8.3* 8.7* 9.3 8.3* 8.8   MG 1.90 1.90  --  1.70  --    ALBUMIN 2.7* 2.6* 3.0* 2.2*  --    ALKPHOS 109 106 119 82  --    ALT 9 11 11 7  --    AST 23 22 23 16  --    BILITOT 1.1 0.7 0.9 0.6  --           Microbiology Results (last 7 days)       Procedure Component Value Units Date/Time    Blood culture x two cultures. Draw prior to antibiotics. [[id number]]  (Normal) Collected: 11/20/22 1450    Order Status: Completed Specimen: Blood Updated: 11/21/22 1901     CULTURE, BLOOD ([facility]) No Growth At 24 Hours    Blood culture x two cultures. Draw prior to antibiotics. [[id number]]  (Normal) Collected: 11/20/22 1454    Order Status: Completed Specimen: Blood Updated: 11/21/22 1901     CULTURE, BLOOD ([facility]) No Growth At 24 Hours             See below for Radiology    Scheduled Med:   allopurinoL  300 mg Oral Daily    atorvastatin  20 mg Oral Daily    lisinopriL  40 mg Oral Daily    pantoprazole  40 mg Oral Daily    piperacillin-tazobactam (ZOSYN) IVPB  4.5 g Intravenous Q8H    ranolazine  500 mg Oral BID        Continuous Infusions:       PRN Meds:  sodium chloride, acetaminophen,  acetaminophen, aluminum-magnesium hydroxide-simethicone, melatonin, morphine, ondansetron, oxyCODONE-acetaminophen, polyethylene glycol, prochlorperazine, senna-docusate 8.6-50 mg, simethicone, sodium chloride 0.9%       Assessment/Plan:  SIRS--- probably neoplastic fever  Severe anemia, suspect GI bleed   Metastatic malignancy of unknown primary  LUQ mass appeared to be arising from the pancreas vs colon      HX CAD/stents, hypertension, hyperlipidemia, GERD     Plan:  Patient is constipated, will start on colace and miralax   Hb better today, was transfused 2 units of PRBC on 11/21/22  His anemia is coming from GI loss. F/U by GI team ? Colonoscopy   Biopsy of mass today by IR team   Monitor H&H closely   Dc iv zosyn. No signs of sepsis     Cont supportive care     Discussed about goals of care with patient and family. Patient wants to be treated for his cancer. He is functional and has good performance/ nutritional status.     Will consult oncology team.     Critical care note:  Critical care diagnosis: Anemia needing blood transfusion   Critical care interventions: Hands-on evaluation, review of labs/radiographs/records and discussion with patient and family if present  Critical care time spent: 35 minutes     VTE prophylaxis: SCDS    Patient condition:  Guarded    Anticipated discharge and Disposition:         All diagnosis and differential diagnosis have been reviewed; assessment and plan has been documented; I have personally reviewed the labs and test results that are presently available; I have reviewed the patients medication list; I have reviewed the consulting providers response and recommendations. I have reviewed or attempted to review medical records based upon their availability    All of the patient's questions have been  addressed and answered. Patient's is agreeable to the above stated plan. I will continue to monitor closely and make adjustments to medical management as  needed.  _____________________________________________________________________    Nutrition Status:    Radiology:  CT Abdomen Pelvis With Contrast  Narrative: EXAMINATION:  CT ABDOMEN PELVIS WITH CONTRAST    CLINICAL HISTORY:  Abdominal abscess/infection suspected;Abdominal pain, acute, nonlocalized;    TECHNIQUE:  Multiple cross-sectional images of the abdomen and pelvis were obtained after the intravenous administration of contrast.  Coronal and sagittal reformatted images obtained.  An automated dose exposure technique was utilized.  This limits radiation does the patient.    COMPARISON:  11/16/2022    FINDINGS:  Dependent atelectatic changes lungs with small to moderate effusion on the left.  Known lesion within the right lung base is also unchanged.  Heart size within normal limits with coronary artery calcifications.  Cardiophrenic adenopathy again noted.    Likely aggressive lesion arising from the tail the pancreas and invading the spleen.  The overall appearance is not significantly changed also the masslike lesion extends into the lesser sac and exerts mass effect on the gastric body.  This is also unchanged.  Metastatic lymph nodes in the lesser sac encasing the superior mesenteric vein/portal vein not significantly changed.  At least 2 liver metastases unchanged..  The adrenal glands and kidneys are grossly normal.  Bosniak type 1 cyst involving the left kidney.    Mass effect upon the gastric body which is unchanged.  Small bowel is of normal caliber.  Colon is also normal caliber with scattered colonic diverticuli.  Normal appendix with appendicoliths at the tip versus metallic density.    Prostate is enlarged.  Bladder is under distended.  The course and caliber of the abdominal aorta is normal with scattered calcified atheromatous disease.  No free fluid in the abdomen pelvis.  Adenopathy as above.  No definitive peritoneal seeding is identified.    Scattered sclerotic lesions are identified of the  lumbar spine.  Spondylotic changes are identified.  These are unchanged.  Soft tissues are grossly normal with small ventral hernia.  Metallic foreign body is identified in the right inguinal region.  Impression: Unchanged appearance of the masslike lesion likely arising from the tail the pancreas with extension into the lesser sac as described above.  Known diffuse metastatic disease is unchanged.    Electronically signed by: Bari Cody MD  Date:    11/20/2022  Time:    17:55  X-Ray Chest AP Portable  EXAMINATION  XR CHEST AP PORTABLE    CLINICAL HISTORY  Sepsis;    TECHNIQUE  A total of 1 frontal view(s) of the chest.    COMPARISON  11 November 2022    FINDINGS  Lines/tubes/devices: ECG leads overlie the imaged region.    The cardiac silhouette and central vascular structures are unchanged.  The trachea is midline. No new or worsening consolidation is identified. There is no enlarging pleural effusion or convincing pneumothorax.    Regional osseous structures and extrathoracic soft tissues are similar.    IMPRESSION  No significant interval change.    Electronically signed by: Louis Alex  Date:    11/20/2022  Time:    15:04      Dominic Nowak MD   11/22/2022

## 2022-11-22 NOTE — PLAN OF CARE
Problem: Adult Inpatient Plan of Care  Goal: Plan of Care Review  Outcome: Ongoing, Progressing  Goal: Patient-Specific Goal (Individualized)  Outcome: Ongoing, Progressing  Goal: Absence of Hospital-Acquired Illness or Injury  Outcome: Ongoing, Progressing  Goal: Optimal Comfort and Wellbeing  Outcome: Ongoing, Progressing  Goal: Readiness for Transition of Care  Outcome: Ongoing, Progressing     Problem: Adjustment to Illness (Sepsis/Septic Shock)  Goal: Optimal Coping  Outcome: Ongoing, Progressing     Problem: Bleeding (Sepsis/Septic Shock)  Goal: Absence of Bleeding  Outcome: Ongoing, Progressing     Problem: Glycemic Control Impaired (Sepsis/Septic Shock)  Goal: Blood Glucose Level Within Desired Range  Outcome: Ongoing, Progressing     Problem: Infection Progression (Sepsis/Septic Shock)  Goal: Absence of Infection Signs and Symptoms  Outcome: Ongoing, Progressing     Problem: Nutrition Impaired (Sepsis/Septic Shock)  Goal: Optimal Nutrition Intake  Outcome: Ongoing, Progressing     Problem: Skin Injury Risk Increased  Goal: Skin Health and Integrity  Outcome: Ongoing, Progressing

## 2022-11-22 NOTE — OP NOTE
Radiology Post-Procedure Note    Pre Op Diagnosis: Pancreatic Mass    Post Op Diagnosis:  mass    Procedure: Pancreatic biopsy    Procedure performed by: Eb Wallace M.D.    Written Informed Consent Obtained: Yes    Specimen Removed: YES    Estimated Blood Loss: Minimal    Findings:   Standard CT Biopsy    Patient tolerated procedure well.    Eb Wallace MD

## 2022-11-22 NOTE — NURSING
1242 pt returned from IR, bandaid to the left lateral abdominal area dry and intact, pt AAO, family at the bedside

## 2022-11-23 LAB
ANION GAP SERPL CALC-SCNC: 9 MEQ/L
BASOPHILS # BLD AUTO: 0.1 X10(3)/MCL (ref 0–0.2)
BASOPHILS NFR BLD AUTO: 0.5 %
BUN SERPL-MCNC: 14.4 MG/DL (ref 8.4–25.7)
CALCIUM SERPL-MCNC: 8.7 MG/DL (ref 8.8–10)
CHLORIDE SERPL-SCNC: 105 MMOL/L (ref 98–107)
CO2 SERPL-SCNC: 24 MMOL/L (ref 23–31)
CREAT SERPL-MCNC: 1.02 MG/DL (ref 0.73–1.18)
CREAT/UREA NIT SERPL: 14
EOSINOPHIL # BLD AUTO: 0.42 X10(3)/MCL (ref 0–0.9)
EOSINOPHIL NFR BLD AUTO: 1.9 %
ERYTHROCYTE [DISTWIDTH] IN BLOOD BY AUTOMATED COUNT: 17.2 % (ref 11.5–17)
GFR SERPLBLD CREATININE-BSD FMLA CKD-EPI: >60 MLS/MIN/1.73/M2
GLUCOSE SERPL-MCNC: 121 MG/DL (ref 82–115)
HCT VFR BLD AUTO: 28.6 % (ref 42–52)
HGB BLD-MCNC: 9.1 GM/DL (ref 14–18)
IMM GRANULOCYTES # BLD AUTO: 0.1 X10(3)/MCL (ref 0–0.04)
IMM GRANULOCYTES NFR BLD AUTO: 0.5 %
LYMPHOCYTES # BLD AUTO: 2.02 X10(3)/MCL (ref 0.6–4.6)
LYMPHOCYTES NFR BLD AUTO: 9.1 %
MCH RBC QN AUTO: 30.6 PG (ref 27–31)
MCHC RBC AUTO-ENTMCNC: 31.8 MG/DL (ref 33–36)
MCV RBC AUTO: 96.3 FL (ref 80–94)
MONOCYTES # BLD AUTO: 1.54 X10(3)/MCL (ref 0.1–1.3)
MONOCYTES NFR BLD AUTO: 7 %
NEUTROPHILS # BLD AUTO: 18 X10(3)/MCL (ref 2.1–9.2)
NEUTROPHILS NFR BLD AUTO: 81 %
NRBC BLD AUTO-RTO: 0 %
PLATELET # BLD AUTO: 398 X10(3)/MCL (ref 130–400)
PMV BLD AUTO: 8.4 FL (ref 7.4–10.4)
POTASSIUM SERPL-SCNC: 4.4 MMOL/L (ref 3.5–5.1)
RBC # BLD AUTO: 2.97 X10(6)/MCL (ref 4.7–6.1)
SODIUM SERPL-SCNC: 138 MMOL/L (ref 136–145)
WBC # SPEC AUTO: 22.2 X10(3)/MCL (ref 4.5–11.5)

## 2022-11-23 PROCEDURE — 25000003 PHARM REV CODE 250: Performed by: INTERNAL MEDICINE

## 2022-11-23 PROCEDURE — 21400001 HC TELEMETRY ROOM

## 2022-11-23 PROCEDURE — 36415 COLL VENOUS BLD VENIPUNCTURE: CPT | Performed by: INTERNAL MEDICINE

## 2022-11-23 PROCEDURE — 80048 BASIC METABOLIC PNL TOTAL CA: CPT | Performed by: INTERNAL MEDICINE

## 2022-11-23 PROCEDURE — 85025 COMPLETE CBC W/AUTO DIFF WBC: CPT | Performed by: INTERNAL MEDICINE

## 2022-11-23 PROCEDURE — 99233 SBSQ HOSP IP/OBS HIGH 50: CPT | Mod: ,,, | Performed by: INTERNAL MEDICINE

## 2022-11-23 PROCEDURE — 27201423 OPTIME MED/SURG SUP & DEVICES STERILE SUPPLY: Performed by: INTERNAL MEDICINE

## 2022-11-23 PROCEDURE — 99233 PR SUBSEQUENT HOSPITAL CARE,LEVL III: ICD-10-PCS | Mod: ,,, | Performed by: INTERNAL MEDICINE

## 2022-11-23 PROCEDURE — 91110 GI TRC IMG INTRAL ESOPH-ILE: CPT | Mod: TC | Performed by: INTERNAL MEDICINE

## 2022-11-23 RX ADMIN — LISINOPRIL 40 MG: 20 TABLET ORAL at 08:11

## 2022-11-23 RX ADMIN — RANOLAZINE 500 MG: 500 TABLET, EXTENDED RELEASE ORAL at 08:11

## 2022-11-23 RX ADMIN — SODIUM SULFATE, POTASSIUM SULFATE, MAGNESIUM SULFATE 354 ML: 17.5; 3.13; 1.6 SOLUTION, CONCENTRATE ORAL at 04:11

## 2022-11-23 RX ADMIN — PANTOPRAZOLE SODIUM 40 MG: 40 TABLET, DELAYED RELEASE ORAL at 12:11

## 2022-11-23 RX ADMIN — ALLOPURINOL 300 MG: 300 TABLET ORAL at 12:11

## 2022-11-23 RX ADMIN — ATORVASTATIN CALCIUM 20 MG: 10 TABLET, FILM COATED ORAL at 12:11

## 2022-11-23 RX ADMIN — DOCUSATE SODIUM 100 MG: 100 CAPSULE, LIQUID FILLED ORAL at 08:11

## 2022-11-23 NOTE — PROGRESS NOTES
Progress Note         Hospital follow up  CC:    Subjective:   Status post biopsy of liver mass earlier today.  GI also following for symptoms of melena and anemia.  EGD colonoscopy negative.  We spoke of proceeding with prep tonight with video capsule examination tomorrow morning to complete the bowel evaluation.      Review of Systems:     Review of Systems    Objective:   Scheduled Meds:   allopurinoL  300 mg Oral Daily    atorvastatin  20 mg Oral Daily    docusate sodium  100 mg Oral BID    fentaNYL        LIDOcaine (PF) 20 mg/mL (2%)        lisinopriL  40 mg Oral Daily    midazolam        pantoprazole  40 mg Oral Daily    polyethylene glycol  17 g Oral Daily    ranolazine  500 mg Oral BID     Continuous Infusions:  PRN Meds:  sodium chloride, acetaminophen, acetaminophen, aluminum-magnesium hydroxide-simethicone, melatonin, morphine, ondansetron, oxyCODONE-acetaminophen, polyethylene glycol, prochlorperazine, senna-docusate 8.6-50 mg, simethicone, sodium chloride 0.9%      VITAL SIGNS: 24 HR MIN & MAX LAST    Temp  Min: 97.4 °F (36.3 °C)  Max: 98.9 °F (37.2 °C)  98.3 °F (36.8 °C)        BP  Min: 124/67  Max: 140/84  136/71     Pulse  Min: 60  Max: 78  66     Resp  Min: 18  Max: 20  20   SpO2  Min: 92 %  Max: 97 %  97 %        Intake/Output Summary (Last 24 hours) at 11/22/2022 1812  Last data filed at 11/22/2022 1500  Gross per 24 hour   Intake 720 ml   Output --   Net 720 ml       Physical Exam       Recent Results (from the past 24 hour(s))   Basic Metabolic Panel    Collection Time: 11/22/22  3:51 AM   Result Value Ref Range    Sodium Level 138 136 - 145 mmol/L    Potassium Level 4.2 3.5 - 5.1 mmol/L    Chloride 105 98 - 107 mmol/L    Carbon Dioxide 21 (L) 23 - 31 mmol/L    Glucose Level 103 82 - 115 mg/dL    Blood Urea Nitrogen 15.0 8.4 - 25.7 mg/dL    Creatinine 0.85 0.73 - 1.18 mg/dL    BUN/Creatinine Ratio 18     Calcium Level Total 8.8 8.8 - 10.0 mg/dL    Anion Gap 12.0 mEq/L    eGFR >60 mls/min/1.73/m2    CBC with Differential    Collection Time: 11/22/22  3:51 AM   Result Value Ref Range    WBC 24.2 (H) 4.5 - 11.5 x10(3)/mcL    RBC 2.96 (L) 4.70 - 6.10 x10(6)/mcL    Hgb 9.1 (L) 14.0 - 18.0 gm/dL    Hct 28.4 (L) 42.0 - 52.0 %    MCV 95.9 (H) 80.0 - 94.0 fL    MCH 30.7 27.0 - 31.0 pg    MCHC 32.0 (L) 33.0 - 36.0 mg/dL    RDW 17.6 (H) 11.5 - 17.0 %    Platelet 366 130 - 400 x10(3)/mcL    MPV 9.1 7.4 - 10.4 fL    Neut % 80.2 %    Lymph % 8.4 %    Mono % 7.1 %    Eos % 2.8 %    Basophil % 0.5 %    Lymph # 2.03 0.6 - 4.6 x10(3)/mcL    Neut # 19.4 (H) 2.1 - 9.2 x10(3)/mcL    Mono # 1.72 (H) 0.1 - 1.3 x10(3)/mcL    Eos # 0.68 0 - 0.9 x10(3)/mcL    Baso # 0.11 0 - 0.2 x10(3)/mcL    IG# 0.23 (H) 0 - 0.04 x10(3)/mcL    IG% 1.0 %    NRBC% 0.0 %       X-Ray Chest PA And Lateral    Result Date: 11/11/2022  EXAMINATION: XR CHEST PA AND LATERAL CLINICAL HISTORY: cough; TECHNIQUE: Two-view COMPARISON: None available. FINDINGS: Cardiopericardial silhouette is within normal limits.  No acute dense focal or segmental consolidation, congestion, pleural effusion or pneumothorax.  Bilateral shoulder arthroplasties.     No acute cardiopulmonary process identified. Electronically signed by: Toby Lee Date:    11/11/2022 Time:    12:51    CT Abdomen Pelvis With Contrast    Result Date: 11/20/2022  EXAMINATION: CT ABDOMEN PELVIS WITH CONTRAST CLINICAL HISTORY: Abdominal abscess/infection suspected;Abdominal pain, acute, nonlocalized; TECHNIQUE: Multiple cross-sectional images of the abdomen and pelvis were obtained after the intravenous administration of contrast.  Coronal and sagittal reformatted images obtained.  An automated dose exposure technique was utilized.  This limits radiation does the patient. COMPARISON: 11/16/2022 FINDINGS: Dependent atelectatic changes lungs with small to moderate effusion on the left.  Known lesion within the right lung base is also unchanged.  Heart size within normal limits with coronary artery  calcifications.  Cardiophrenic adenopathy again noted. Likely aggressive lesion arising from the tail the pancreas and invading the spleen.  The overall appearance is not significantly changed also the masslike lesion extends into the lesser sac and exerts mass effect on the gastric body.  This is also unchanged.  Metastatic lymph nodes in the lesser sac encasing the superior mesenteric vein/portal vein not significantly changed.  At least 2 liver metastases unchanged..  The adrenal glands and kidneys are grossly normal.  Bosniak type 1 cyst involving the left kidney. Mass effect upon the gastric body which is unchanged.  Small bowel is of normal caliber.  Colon is also normal caliber with scattered colonic diverticuli.  Normal appendix with appendicoliths at the tip versus metallic density. Prostate is enlarged.  Bladder is under distended.  The course and caliber of the abdominal aorta is normal with scattered calcified atheromatous disease.  No free fluid in the abdomen pelvis.  Adenopathy as above.  No definitive peritoneal seeding is identified. Scattered sclerotic lesions are identified of the lumbar spine.  Spondylotic changes are identified.  These are unchanged.  Soft tissues are grossly normal with small ventral hernia.  Metallic foreign body is identified in the right inguinal region.     Unchanged appearance of the masslike lesion likely arising from the tail the pancreas with extension into the lesser sac as described above.  Known diffuse metastatic disease is unchanged. Electronically signed by: Bari Cody MD Date:    11/20/2022 Time:    17:55    CT Abdomen Pelvis With Contrast    Result Date: 11/16/2022  EXAMINATION: CT ABDOMEN PELVIS WITH CONTRAST CLINICAL HISTORY: Abdominal abscess/infection suspected; TECHNIQUE: Helically acquired images with axial, sagittal and coronal reformations were obtained from the lung bases to the pubic symphysis after the IV administration of contrast. Automated tube  current modulation, weight-based exposure dosing, and/or iterative reconstruction technique utilized to reach lowest reasonably achievable exposure rate. DLP: 866 mGy*cm COMPARISON: CT abdomen pelvis 11/11/2022 FINDINGS: HEART: There are coronary artery calcifications. LUNG BASES: There is a heterogeneously enhancing 2.2 cm nodule at the right costophrenic sulcus most compatible with metastasis.  Trace left pleural fluid. LIVER: There are hypovascular lesions in the liver measuring 3.8 cm (2, 18) and 1.8 cm (2, 30).  Trace biliary ectasia in the medial right lobe of the liver, segment 5. BILIARY: No calcified gallstones. PANCREAS: There is a heterogeneously enhancing hypovascular mass lesion involving the tail of the pancreas with contiguous extension to the posterior wall of the stomach and the spleen.  Lesion abuts the left diaphragm.  This is similar in configuration compared to prior exam.  No internal foci of gas. SPLEEN: See PANCREAS discussion above.  Mass lesion replaces/invade splenic parenchyma anteriorly.  There is a more discretely organized subcapsular component laterally which is similar to prior measuring approximately 11 cm. ADRENALS: No mass. KIDNEYS/URETERS: The kidneys enhance symmetrically.  No hydronephrosis. Incidental left renal cyst.   No follow-up imaging is recommended as incidental lesions are likely benign. GI TRACT/MESENTERY: Abnormal mass lesion infiltrating the posterior wall of the proximal stomach.  See PANCREAS section above.  The bowel is normal in caliber without evidence of obstruction. PERITONEUM: Small volume free intraperitoneal fluid.No free air. LYMPH NODES: There are nonspecific peripancreatic and periportal lymph nodes.  There are enlarged cardiophrenic lymph nodes at the left lung base. VASCULATURE: Aortoiliac atherosclerosis.  The splenic vein is occluded at the tail the pancreas. BLADDER: Normal appearance given degree of distention. REPRODUCTIVE ORGANS: Prostatomegaly.  SOFT TISSUES: Unremarkable. BONES: There is a 1.5 cm lesion at L1 posteriorly on the left with sclerotic rim.     1. Similar appearance to previous exam. 2. Heterogeneously enhancing, hypovascular mass in the left upper quadrant involving the tail the pancreas, stomach, spleen and left hemidiaphragm most compatible with locally infiltrative malignancy.  There are hypodense components including a subcapsular component along the lateral margin of the spleen which may be related to lesion necrosis or other fluid collections associated with malignancy such as a old hematoma or seroma.  There are no internal foci of gas or overt changes to suggest superinfection. 3. Presumed metastatic lesions at the right lower lobe, liver and L1 4. Peripancreatic, periportal and cardiophrenic lymph nodes Electronically signed by: Elvira Lino Date:    11/16/2022 Time:    10:14    CT Abdomen Pelvis With Contrast    Result Date: 11/11/2022  EXAMINATION: CT ABDOMEN PELVIS WITH CONTRAST CLINICAL HISTORY: Abdominal pain, acute, nonlocalized; TECHNIQUE: Low dose axial images, sagittal and coronal reformations were obtained from the lung bases to the pubic symphysis following the IV administration of contrast. Automatic exposure control (AEC) is utilized to reduce patient radiation exposure. COMPARISON: None. FINDINGS: There is a area of nodularity in the right lower lobe posterior basal aspect.  It measures 2 cm by 1.8 cm.  Another punctate nodule seen in the right lower lobe on image number 18 series 3.  It is 4 mm in size.. There is a mass seen in the dome of the liver is in segment 4.  It measures 3.3 by 3.3 cm.  There is another mass seen along the inferior margin liver on image number 30 of series 2.  It is seen in segment 6.  It measures 1.2 cm.. There is a large mass seen in the stomach involving the fundus of the stomach and the greater curvature.  There is circumferential wall thickening seen in the body of the stomach.  The  mass has a heterogeneous appearance.  Rough measurements are 12 cm x 9 cm.  There is a mass also seen in the tail the pancreas which extends into the region of the spleen and the stomach.  It is uncertain if the mass is originating from the tail the pancreas or the stomach.  Mass extends into the left upper quadrant and measures 10 cm x 10 cm.. Gallbladder appears unremarkable. The adrenal glands appear normal.  No adrenal nodule is seen. The kidneys appear normal.  There is a cyst in the left kidney in the midpole.  No hydronephrosis is seen.  No hydroureter is seen.  No nephrolithiasis is seen.  No obvious ureteral stones are seen. Urinary bladder appears grossly unremarkable. The prostate is enlarged in size.. No colitis is seen.  No diverticulitis is seen.  No obvious colonic mass or lesion is seen. No free air is seen.  No free fluid is seen.     Very large aggressive mass seen in the left upper quadrant.  It involves the tail the pancreas and the stomach and the spleen.  It is difficult to tell with their the masses originating from the stomach or the pancreas. There is metastasis seen in the liver in segment 4 and segment 6 Right lower lobe lung nodules concerning for metastatic disease Electronically signed by: Jj Granado Date:    11/11/2022 Time:    18:21    X-Ray Chest AP Portable    Result Date: 11/20/2022  EXAMINATION XR CHEST AP PORTABLE CLINICAL HISTORY Sepsis; TECHNIQUE A total of 1 frontal view(s) of the chest. COMPARISON 11 November 2022 FINDINGS Lines/tubes/devices: ECG leads overlie the imaged region. The cardiac silhouette and central vascular structures are unchanged.  The trachea is midline. No new or worsening consolidation is identified. There is no enlarging pleural effusion or convincing pneumothorax. Regional osseous structures and extrathoracic soft tissues are similar. IMPRESSION No significant interval change. Electronically signed by: Louis Alex Date:    11/20/2022  Time:    15:04    CT Biopsy Abd Retro Mass    Result Date: 11/22/2022  EXAMINATION: CT BIOPSY ABD RETRO MASS CLINICAL HISTORY: abdominal mass; ATTENDING: Eb Wallace ANESTHESIA: Local and Moderate Sedation - Sedation was provided by physician: An independent trained observer, sedation nurse was present to assist in the monitoring of the patients level of consciousness and physiologic status. Moderate Sedation was performed for 30 minutes. TECHNIQUE: CT guided Biopsy of a pancreatic tail mass. Automatic exposure control was utilized to reduce patient radiation dosage. DLP = 346 After the risks, benefits and alternatives were discussed, consent was obtained. A time out was performed to verify the patient's identity and procedure. The pancreatic tail mass was localized with CT to confirm a window for biopsy. The skin over the biopsy site was cleaned and prepped in normal sterile fashion. Subcutaneous tissues were anesthetized with a lidocaine solution. A 5 cm 19 gauge coaxial needle was advanced to the mass. After confirming the position the stylus was removed and 3 core biopsies were obtained.  The pathologist was present and confirmed adequacy of the samples. The patient tolerated the procedure well. Estimated blood loss: < 10ml. Followup CT scan demonstrated no evidence of hematoma.     CT-guided pancreatic tail mass biopsy as described above. Electronically signed by: Eb Wallace Date:    11/22/2022 Time:    13:25      Imaging personally reviewed by myself and SP.    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

## 2022-11-23 NOTE — PROGRESS NOTES
[facility] Hospital Medicine Progress Note        Chief Complaint: Inpatient Follow-up for severe anemia     HPI:   This is an 82-year-old male medical history of CAD/PCI on aspirin and Brilinta, hypertension, and recently admitted with GI bleeding, persistent leukocytosis of unclear etiology, CT imaging and noted to have left upper quadrant abdominal mass that appeared to be arising from the tail of the pancreas with extension into the lesser sac and finding of diffuse metastatic disease, he was discharged given no IR availability last week and was planned to follow up with IR tomorrow Monday 11/21/2022 for a CT-guided biopsy.     Present to the ED today complaining of fatigue and weakness that started the night prior, report he continued to have dark stool since his discharge from the hospital and intermittent abdominal pain/cramps.  Today he was febrile on arrival with temp 102.4F otherwise hemodynamically stable.  Labs notable for hemoglobin 8.2, 3 days ago was 8.6.  Chest x-ray show no acute finding.  And repeat CT abdomen pelvis show stable malignancy findings.     He was given antipyretics, IV fluids, and Zosyn and vancomycin and referred to hospital medicine service for further evaluation and management.    Patients Hb dropped to < 7 and was transfused 2 units of PRBC. He was scheduled for a IR guided biopsy of the mass on 11/22/22. Seen by GI team and VCE was done.     Interval Hx:   Patient awake and comfortable.No acute issues overnight. Denies any abdominal pain, fever or chills    Objective/physical exam:  General: In no acute distress, afebrile  Chest: Clear to auscultation bilaterally  Heart: RRR, +S1, S2, no appreciable murmur  Abdomen: Soft, Mild tenderness in all quadrants, BS +  MSK: Warm, no lower extremity edema, no clubbing or cyanosis  Neurologic: Alert and oriented x4, Cranial nerve II-XII intact, Strength 5/5 in all 4 extremities    VITAL SIGNS: 24 HRS MIN &  MAX LAST   Temp  Min: 97.6 °F (36.4 °C)  Max: 99.1 °F (37.3 °C) 98.6 °F (37 °C)   BP  Min: 128/62  Max: 147/70 (!) 144/70   Pulse  Min: 64  Max: 69  69   Resp  Min: 18  Max: 20 18   SpO2  Min: 94 %  Max: 97 % (!) 94 %       Recent Labs   Lab 11/21/22  0505 11/22/22  0351 11/23/22  0409   WBC 20.5* 24.2* 22.2*   RBC 2.07* 2.96* 2.97*   HGB 6.7* 9.1* 9.1*   HCT 20.5* 28.4* 28.6*   MCV 99.0* 95.9* 96.3*   MCH 32.4* 30.7 30.6   MCHC 32.7* 32.0* 31.8*   RDW 15.9 17.6* 17.2*    366 398   MPV 8.7 9.1 8.4       Recent Labs   Lab 11/17/22  0408 11/20/22  1451 11/21/22  0505 11/22/22  0351 11/23/22  0409   * 138 132* 138 138   K 4.1 4.3 3.9 4.2 4.4   CO2 26 21* 20* 21* 24   BUN 12.8 19.4 14.9 15.0 14.4   CREATININE 1.11 1.10 0.73 0.85 1.02   CALCIUM 8.7* 9.3 8.3* 8.8 8.7*   MG 1.90  --  1.70  --   --    ALBUMIN 2.6* 3.0* 2.2*  --   --    ALKPHOS 106 119 82  --   --    ALT 11 11 7  --   --    AST 22 23 16  --   --    BILITOT 0.7 0.9 0.6  --   --           Microbiology Results (last 7 days)       Procedure Component Value Units Date/Time    Blood culture x two cultures. Draw prior to antibiotics. [[id number]]  (Normal) Collected: 11/20/22 1450    Order Status: Completed Specimen: Blood Updated: 11/22/22 1901     CULTURE, BLOOD ([facility]) No Growth At 48 Hours    Blood culture x two cultures. Draw prior to antibiotics. [[id number]]  (Normal) Collected: 11/20/22 5528    Order Status: Completed Specimen: Blood Updated: 11/22/22 1901     CULTURE, BLOOD ([facility]) No Growth At 48 Hours             See below for Radiology    Scheduled Med:   allopurinoL  300 mg Oral Daily    atorvastatin  20 mg Oral Daily    docusate sodium  100 mg Oral BID    lisinopriL  40 mg Oral Daily    pantoprazole  40 mg Oral Daily    polyethylene glycol  17 g Oral Daily    ranolazine  500 mg Oral BID        Continuous Infusions:       PRN Meds:  sodium chloride, acetaminophen, acetaminophen, aluminum-magnesium hydroxide-simethicone, melatonin, morphine,  ondansetron, oxyCODONE-acetaminophen, polyethylene glycol, prochlorperazine, senna-docusate 8.6-50 mg, simethicone, sodium chloride 0.9%       Assessment/Plan:  SIRS--- probably neoplastic fever  Severe anemia, suspect GI bleed   Metastatic malignancy of unknown primary  LUQ mass appeared to be arising from the pancreas vs colon      HX CAD/stents, hypertension, hyperlipidemia, GERD     Plan:  Patient doing well. S/P VCE by GI team  Had loose stools and was dark in color. No bringht blood seen  Hb better stable today, was transfused 2 units of PRBC on 11/21/22  Biopsy of mass today by IR team   Monitor H&H closely    Cont supportive care     Discussed about goals of care with patient and family. Patient wants to be treated for his cancer. He is functional and has good performance/ nutritional status.     F/U in oncology clinic       VTE prophylaxis: SCDS    Patient condition:  fair     Anticipated discharge and Disposition:   Dc to home in am if stable       All diagnosis and differential diagnosis have been reviewed; assessment and plan has been documented; I have personally reviewed the labs and test results that are presently available; I have reviewed the patients medication list; I have reviewed the consulting providers response and recommendations. I have reviewed or attempted to review medical records based upon their availability    All of the patient's questions have been  addressed and answered. Patient's is agreeable to the above stated plan. I will continue to monitor closely and make adjustments to medical management as needed.  _____________________________________________________________________    Nutrition Status:    Radiology:  CT Biopsy Abd Retro Mass  Narrative: EXAMINATION:  CT BIOPSY ABD RETRO MASS    CLINICAL HISTORY:  abdominal mass;    ATTENDING: Eb Wallace    ANESTHESIA:    Local and Moderate Sedation - Sedation was provided by physician: An independent trained observer, sedation nurse  was present to assist in the monitoring of the patients level of consciousness and physiologic status.    Moderate Sedation was performed for 30 minutes.    TECHNIQUE:  CT guided Biopsy of a pancreatic tail mass.    Automatic exposure control was utilized to reduce patient radiation dosage.    DLP = 346    After the risks, benefits and alternatives were discussed, consent was obtained. A time out was performed to verify the patient's identity and procedure.    The pancreatic tail mass was localized with CT to confirm a window for biopsy. The skin over the biopsy site was cleaned and prepped in normal sterile fashion. Subcutaneous tissues were anesthetized with a lidocaine solution. A 5 cm 19 gauge coaxial needle was advanced to the mass.    After confirming the position the stylus was removed and 3 core biopsies were obtained.  The pathologist was present and confirmed adequacy of the samples.    The patient tolerated the procedure well.    Estimated blood loss: < 10ml.    Followup CT scan demonstrated no evidence of hematoma.  Impression: CT-guided pancreatic tail mass biopsy as described above.    Electronically signed by: Eb Wallace  Date:    11/22/2022  Time:    13:25      Dominic Nowak MD   11/23/2022

## 2022-11-23 NOTE — PROGRESS NOTES
"Gastroenterology Progress Note    Subjective/Interval History:  H&H 9.1/28.6 - stable  Video capsule deployed at 1000. GI lab nurse educated patient on diet, activity, device instructions.  Patient states he received half of a colonoscopy prep last night which helped him to have several Bms.    ROS:  Review of Systems   Constitutional:  Negative for malaise/fatigue.   Respiratory:  Negative for cough and shortness of breath.    Cardiovascular:  Negative for chest pain.   Gastrointestinal:  Negative for abdominal pain, constipation, nausea and vomiting.   Neurological:  Negative for weakness.     Vital Signs:  /62 (BP Location: Left arm)   Pulse 64   Temp 98.8 °F (37.1 °C) (Oral)   Resp 20   Ht 6' 3" (1.905 m)   Wt 105.6 kg (232 lb 12.9 oz)   SpO2 (!) 94%   BMI 29.10 kg/m²   Body mass index is 29.1 kg/m².    Physical Exam:  Physical Exam  Constitutional:       General: He is not in acute distress.     Appearance: He is obese. He is not ill-appearing.   HENT:      Head: Normocephalic and atraumatic.      Right Ear: External ear normal.      Left Ear: External ear normal.      Nose: Nose normal.      Mouth/Throat:      Pharynx: Oropharynx is clear.   Eyes:      Conjunctiva/sclera: Conjunctivae normal.   Cardiovascular:      Rate and Rhythm: Normal rate.   Pulmonary:      Effort: Pulmonary effort is normal. No respiratory distress.   Musculoskeletal:      Cervical back: Normal range of motion.   Skin:     General: Skin is warm and dry.   Neurological:      Mental Status: He is alert and oriented to person, place, and time. Mental status is at baseline.      Motor: No weakness.   Psychiatric:         Mood and Affect: Mood normal.         Behavior: Behavior normal.         Thought Content: Thought content normal.       Labs:  Recent Results (from the past 24 hour(s))   Basic Metabolic Panel    Collection Time: 11/23/22  4:09 AM   Result Value Ref Range    Sodium Level 138 136 - 145 mmol/L    Potassium Level " 4.4 3.5 - 5.1 mmol/L    Chloride 105 98 - 107 mmol/L    Carbon Dioxide 24 23 - 31 mmol/L    Glucose Level 121 (H) 82 - 115 mg/dL    Blood Urea Nitrogen 14.4 8.4 - 25.7 mg/dL    Creatinine 1.02 0.73 - 1.18 mg/dL    BUN/Creatinine Ratio 14     Calcium Level Total 8.7 (L) 8.8 - 10.0 mg/dL    Anion Gap 9.0 mEq/L    eGFR >60 mls/min/1.73/m2   CBC with Differential    Collection Time: 11/23/22  4:09 AM   Result Value Ref Range    WBC 22.2 (H) 4.5 - 11.5 x10(3)/mcL    RBC 2.97 (L) 4.70 - 6.10 x10(6)/mcL    Hgb 9.1 (L) 14.0 - 18.0 gm/dL    Hct 28.6 (L) 42.0 - 52.0 %    MCV 96.3 (H) 80.0 - 94.0 fL    MCH 30.6 27.0 - 31.0 pg    MCHC 31.8 (L) 33.0 - 36.0 mg/dL    RDW 17.2 (H) 11.5 - 17.0 %    Platelet 398 130 - 400 x10(3)/mcL    MPV 8.4 7.4 - 10.4 fL    Neut % 81.0 %    Lymph % 9.1 %    Mono % 7.0 %    Eos % 1.9 %    Basophil % 0.5 %    Lymph # 2.02 0.6 - 4.6 x10(3)/mcL    Neut # 18.0 (H) 2.1 - 9.2 x10(3)/mcL    Mono # 1.54 (H) 0.1 - 1.3 x10(3)/mcL    Eos # 0.42 0 - 0.9 x10(3)/mcL    Baso # 0.10 0 - 0.2 x10(3)/mcL    IG# 0.10 (H) 0 - 0.04 x10(3)/mcL    IG% 0.5 %    NRBC% 0.0 %         Assessment:  Melena/anemia-EGD/colonoscopy-normal  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 Please call with any questions.     Stephie Mcconnell NP/Leana Mancera PA-C acting as scribe for Aaron Soto MD  Gastroenterology  [facility]

## 2022-11-23 NOTE — PROGRESS NOTES
Subjective:       Patient ID: Bari Vallecillo is a 82 y.o. male.    Chief Complaint: Fatigue (Patient has been complaining of weakness.  Pt recently diagnosed with a mass in his abdomen with possible other areas, but was unable to have a biopsy done yet.  Pt had blood transfusion done earlier this week. 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    Current Treatment:   Pending workup and diagnosis    Treatment History:  N/A    HPI:  82-year-old male with a history of coronary artery disease underwent an EGD and colonoscopy in September of 2022 for a workup of abdominal pain, these were unremarkable.  On 11/11/2022, the patient was having persistent abdominal pain and had outpatient labs revealed leukocytosis.  Was referred to the emergency department.  He was admitted to the hospitalist service.  A CT of the abdomen and pelvis was obtained on 11/11/2022 and showed an aggressive mass in the left upper quadrant measuring 12 cm x 9 cm.  This involved the tail of the pancreas, the stomach, and the spleen.  There was metastasis seen in the liver.  CA 19 9 and CEA were done on 11/14/2022, CA 19 9 was elevated at 867.48, CEA was elevated at 3.74.  EGD on 11/14/2022 revealed normal esophagus and stomach.  There was an extrinsic mass noted to be compressing the body of the stomach.  A repeat CT of the abdomen and pelvis on 11/16/2022 showed similar findings.  There was presumed metastatic lesions at the right lower lobe, liver, and L1.  The patient was then discharged home on 11/17/2022 with plans for outpatient IR guided biopsy.  Prior to having this biopsy, the patient presented to the emergency department on 11/20/2022 with fatigue and weakness and continued dark stool.  He also was noted to have a fever of 102.4.  Repeat CT of the abdomen and pelvis on 11/20/2022 revealed unchanged appearance of the masslike lesion arising from the tail  of the pancreas with known diffuse metastatic disease being unchanged.  The patient then underwent biopsy of the pancreatic tail mass on 11/22/2022.  Oncology consulted further discussion.  I saw the patient on 11/22/2022.  At that initial consult, the patient stated that he continued to have weakness in dark stool.    Interval History:   Patient seen and examined on rounds.  No acute events occurred overnight.  He is overall doing well.  He will undergo video capsule endoscopy today.    Past Medical History:   Diagnosis Date    Coronary artery disease     Sleep apnea       Past Surgical History:   Procedure Laterality Date    ESOPHAGOGASTRODUODENOSCOPY N/A 11/14/2022    Procedure: EGD (ESOPHAGOGASTRODUODENOSCOPY);  Surgeon: SOULEYMANE Nunn MD;  Location: [facility] ENDOSCOPY;  Service: Gastroenterology;  Laterality: N/A;    JOINT REPLACEMENT       Social History     Socioeconomic History    Marital status:    Tobacco Use    Smoking status: Never    Smokeless tobacco: Never   Substance and Sexual Activity    Alcohol use: Not Currently      No family history on file.   Review of patient's allergies indicates:  No Known Allergies   Review of Systems   Constitutional:  Positive for fatigue and fever. Negative for unexpected weight change.   HENT:  Negative for mouth dryness, nosebleeds and sore throat.    Respiratory:  Negative for chest tightness and shortness of breath.    Cardiovascular:  Negative for chest pain and palpitations.   Gastrointestinal:  Positive for abdominal pain and blood in stool. Negative for constipation and diarrhea.   Genitourinary:  Negative for difficulty urinating and dysuria.   Musculoskeletal:  Negative for arthralgias and back pain.   Integumentary:  Negative for rash.   Neurological:  Positive for weakness. Negative for headaches.   Hematological:  Negative for adenopathy.   Psychiatric/Behavioral:  Negative for agitation.        Objective:      Physical Exam  Vitals  reviewed.   Constitutional:       General: He is awake.      Appearance: Normal appearance.   HENT:      Head: Normocephalic and atraumatic.      Right Ear: Hearing normal.      Left Ear: Hearing normal.      Nose: Nose normal.   Eyes:      General: Lids are normal. Vision grossly intact.      Extraocular Movements: Extraocular movements intact.      Conjunctiva/sclera: Conjunctivae normal.   Cardiovascular:      Rate and Rhythm: Normal rate and regular rhythm.      Pulses: Normal pulses.      Heart sounds: Normal heart sounds.   Pulmonary:      Effort: Pulmonary effort is normal.      Breath sounds: Normal breath sounds. No wheezing, rhonchi or rales.   Abdominal:      General: Bowel sounds are normal. There is distension.      Palpations: Abdomen is soft.      Tenderness: There is abdominal tenderness.   Musculoskeletal:      Cervical back: Full passive range of motion without pain.      Right lower leg: No edema.      Left lower leg: No edema.   Lymphadenopathy:      Cervical: No cervical adenopathy.      Upper Body:      Right upper body: No supraclavicular or axillary adenopathy.      Left upper body: No supraclavicular or axillary adenopathy.   Skin:     General: Skin is warm.   Neurological:      General: No focal deficit present.      Mental Status: He is alert and oriented to person, place, and time.   Psychiatric:         Attention and Perception: Attention normal.         Mood and Affect: Mood and affect normal.         Behavior: Behavior is cooperative.       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        Plan:       At this time, the patient has findings concerning for a pancreatic versus gastric cancer with metastatic disease.    Patient underwent biopsy on 11/22/2022.    Patient being evaluated and followed by GI.  Patient will undergo capsule endoscopy today.  Per the GI note, they are signing off.    We will  watch his counts closely, we may need to consider a bone marrow biopsy to rule out any metastatic involvement in the future if we do not see improvement after transfusion, but I do not think this is necessary now.    Discuss with the patient and his family that we would know more once we got the results of the biopsy.      Trell Munoz II, MD

## 2022-11-24 VITALS
BODY MASS INDEX: 28.95 KG/M2 | HEIGHT: 75 IN | OXYGEN SATURATION: 95 % | WEIGHT: 232.81 LBS | DIASTOLIC BLOOD PRESSURE: 76 MMHG | SYSTOLIC BLOOD PRESSURE: 150 MMHG | HEART RATE: 62 BPM | TEMPERATURE: 98 F | RESPIRATION RATE: 17 BRPM

## 2022-11-24 LAB
BASOPHILS # BLD AUTO: 0.11 X10(3)/MCL (ref 0–0.2)
BASOPHILS NFR BLD AUTO: 0.5 %
EOSINOPHIL # BLD AUTO: 0.45 X10(3)/MCL (ref 0–0.9)
EOSINOPHIL NFR BLD AUTO: 2 %
ERYTHROCYTE [DISTWIDTH] IN BLOOD BY AUTOMATED COUNT: 16.8 % (ref 11.5–17)
HCT VFR BLD AUTO: 30 % (ref 42–52)
HGB BLD-MCNC: 9.7 GM/DL (ref 14–18)
IMM GRANULOCYTES # BLD AUTO: 0.11 X10(3)/MCL (ref 0–0.04)
IMM GRANULOCYTES NFR BLD AUTO: 0.5 %
LYMPHOCYTES # BLD AUTO: 1.97 X10(3)/MCL (ref 0.6–4.6)
LYMPHOCYTES NFR BLD AUTO: 8.8 %
MCH RBC QN AUTO: 30.9 PG (ref 27–31)
MCHC RBC AUTO-ENTMCNC: 32.3 MG/DL (ref 33–36)
MCV RBC AUTO: 95.5 FL (ref 80–94)
MONOCYTES # BLD AUTO: 1.38 X10(3)/MCL (ref 0.1–1.3)
MONOCYTES NFR BLD AUTO: 6.1 %
NEUTROPHILS # BLD AUTO: 18.5 X10(3)/MCL (ref 2.1–9.2)
NEUTROPHILS NFR BLD AUTO: 82.1 %
NRBC BLD AUTO-RTO: 0 %
PLATELET # BLD AUTO: 427 X10(3)/MCL (ref 130–400)
PMV BLD AUTO: 8.8 FL (ref 7.4–10.4)
RBC # BLD AUTO: 3.14 X10(6)/MCL (ref 4.7–6.1)
WBC # SPEC AUTO: 22.5 X10(3)/MCL (ref 4.5–11.5)

## 2022-11-24 PROCEDURE — 25000003 PHARM REV CODE 250: Performed by: INTERNAL MEDICINE

## 2022-11-24 PROCEDURE — 36415 COLL VENOUS BLD VENIPUNCTURE: CPT | Performed by: INTERNAL MEDICINE

## 2022-11-24 PROCEDURE — 85025 COMPLETE CBC W/AUTO DIFF WBC: CPT | Performed by: INTERNAL MEDICINE

## 2022-11-24 RX ORDER — POLYETHYLENE GLYCOL 3350 17 G/17G
17 POWDER, FOR SOLUTION ORAL 2 TIMES DAILY PRN
Status: DISCONTINUED | OUTPATIENT
Start: 2022-11-24 | End: 2022-11-24 | Stop reason: HOSPADM

## 2022-11-24 RX ORDER — SUCRALFATE 1 G/1
1 TABLET ORAL
Qty: 28 TABLET | Refills: 0 | Status: SHIPPED | OUTPATIENT
Start: 2022-11-24 | End: 2022-12-01

## 2022-11-24 RX ORDER — SUCRALFATE 1 G/1
1 TABLET ORAL
Status: DISCONTINUED | OUTPATIENT
Start: 2022-11-24 | End: 2022-11-24 | Stop reason: HOSPADM

## 2022-11-24 RX ORDER — POLYETHYLENE GLYCOL 3350 17 G/17G
17 POWDER, FOR SOLUTION ORAL 2 TIMES DAILY PRN
Refills: 0
Start: 2022-11-24

## 2022-11-24 RX ORDER — SIMETHICONE 80 MG
80 TABLET,CHEWABLE ORAL 4 TIMES DAILY PRN
Refills: 0
Start: 2022-11-24

## 2022-11-24 RX ADMIN — ALLOPURINOL 300 MG: 300 TABLET ORAL at 09:11

## 2022-11-24 RX ADMIN — LISINOPRIL 40 MG: 20 TABLET ORAL at 09:11

## 2022-11-24 RX ADMIN — SIMETHICONE 80 MG: 80 TABLET, CHEWABLE ORAL at 01:11

## 2022-11-24 RX ADMIN — RANOLAZINE 500 MG: 500 TABLET, EXTENDED RELEASE ORAL at 09:11

## 2022-11-24 RX ADMIN — PANTOPRAZOLE SODIUM 40 MG: 40 TABLET, DELAYED RELEASE ORAL at 09:11

## 2022-11-24 RX ADMIN — DOCUSATE SODIUM 100 MG: 100 CAPSULE, LIQUID FILLED ORAL at 09:11

## 2022-11-24 RX ADMIN — ATORVASTATIN CALCIUM 20 MG: 10 TABLET, FILM COATED ORAL at 09:11

## 2022-11-24 NOTE — DISCHARGE SUMMARY
[facility] Hospital Medicine Discharge Summary    Admit Date: 11/20/2022  Discharge Date and Time: 11/24/20229:21 AM  Admitting Physician:  Team  Discharging Physician: Dominic Nowak MD.  Primary Care Physician: Primary Doctor No  Consults: Hematology/Oncology    Discharge Diagnoses:  Persistent leukocytosis, prob fro cancer   Severe anemia, suspect GI bleed : resolved   Metastatic malignancy of unknown primary  LUQ mass appeared to be arising from the pancreas vs colon      HX CAD/stents, hypertension, hyperlipidemia, GERD    Hospital Course:   This is an 82-year-old male medical history of CAD/PCI on aspirin and Brilinta, hypertension, and recently admitted with GI bleeding, persistent leukocytosis of unclear etiology, CT imaging and noted to have left upper quadrant abdominal mass that appeared to be arising from the tail of the pancreas with extension into the lesser sac and finding of diffuse metastatic disease, he was discharged given no IR availability last week and was planned to follow up with IR tomorrow Monday 11/21/2022 for a CT-guided biopsy.     Present to the ED today complaining of fatigue and weakness that started the night prior, report he continued to have dark stool since his discharge from the hospital and intermittent abdominal pain/cramps.  Today he was febrile on arrival with temp 102.4F otherwise hemodynamically stable.  Labs notable for hemoglobin 8.2, 3 days ago was 8.6.  Chest x-ray show no acute finding.  And repeat CT abdomen pelvis show stable malignancy findings.     He was given antipyretics, IV fluids, and Zosyn and vancomycin and referred to hospital medicine service for further evaluation and management.     Patients Hb dropped to < 7 and was transfused 2 units of PRBC. He had a IR guided biopsy of the mass on 11/22/22. Seen by GI team and VCE was done. He had mild abdominal discomfort and constipation. Added stool softeners and laxatives.  He started to have BM. We also set up a consult with oncology team. He will have f/u in GI and oncology clinic. Rpt H&H was stable. He had no active bleeding. He was discharged home.     Discussed about goals of care with patient and family. Patient wants to be treated for his cancer. He is functional and has good performance/ nutritional status.     Pt was seen and examined on the day of discharge  Vitals:  VITAL SIGNS: 24 HRS MIN & MAX LAST   Temp  Min: 97.6 °F (36.4 °C)  Max: 98.6 °F (37 °C) 98.2 °F (36.8 °C)   BP  Min: 144/68  Max: 153/74 (!) 150/76     Pulse  Min: 62  Max: 72  62   Resp  Min: 17  Max: 18 17   SpO2  Min: 94 %  Max: 97 % 95 %       Physical Exam:  Heart RRR  Lungs clear   Abdomen soft and non tender   Neuro: No FND      Procedures Performed: No admission procedures for hospital encounter.     Significant Diagnostic Studies: See Full reports for all details    Recent Labs   Lab 11/22/22  0351 11/23/22  0409 11/24/22  0502   WBC 24.2* 22.2* 22.5*   RBC 2.96* 2.97* 3.14*   HGB 9.1* 9.1* 9.7*   HCT 28.4* 28.6* 30.0*   MCV 95.9* 96.3* 95.5*   MCH 30.7 30.6 30.9   MCHC 32.0* 31.8* 32.3*   RDW 17.6* 17.2* 16.8    398 427*   MPV 9.1 8.4 8.8       Recent Labs   Lab 11/20/22  1451 11/21/22  0505 11/22/22  0351 11/23/22  0409    132* 138 138   K 4.3 3.9 4.2 4.4   CO2 21* 20* 21* 24   BUN 19.4 14.9 15.0 14.4   CREATININE 1.10 0.73 0.85 1.02   CALCIUM 9.3 8.3* 8.8 8.7*   MG  --  1.70  --   --    ALBUMIN 3.0* 2.2*  --   --    ALKPHOS 119 82  --   --    ALT 11 7  --   --    AST 23 16  --   --    BILITOT 0.9 0.6  --   --         Microbiology Results (last 7 days)       Procedure Component Value Units Date/Time    Blood culture x two cultures. Draw prior to antibiotics. [[id number]]  (Normal) Collected: 11/20/22 1450    Order Status: Completed Specimen: Blood Updated: 11/23/22 1901     CULTURE, BLOOD ([facility]) No Growth At 72 Hours    Blood culture x two cultures. Draw prior to antibiotics. [[id number]]   (Normal) Collected: 11/20/22 2041    Order Status: Completed Specimen: Blood Updated: 11/23/22 1901     CULTURE, BLOOD ([facility]) No Growth At 72 Hours             CT Biopsy Abd Retro Mass  Narrative: EXAMINATION:  CT BIOPSY ABD RETRO MASS    CLINICAL HISTORY:  abdominal mass;    ATTENDING: Eb Wallace    ANESTHESIA:    Local and Moderate Sedation - Sedation was provided by physician: An independent trained observer, sedation nurse was present to assist in the monitoring of the patients level of consciousness and physiologic status.    Moderate Sedation was performed for 30 minutes.    TECHNIQUE:  CT guided Biopsy of a pancreatic tail mass.    Automatic exposure control was utilized to reduce patient radiation dosage.    DLP = 346    After the risks, benefits and alternatives were discussed, consent was obtained. A time out was performed to verify the patient's identity and procedure.    The pancreatic tail mass was localized with CT to confirm a window for biopsy. The skin over the biopsy site was cleaned and prepped in normal sterile fashion. Subcutaneous tissues were anesthetized with a lidocaine solution. A 5 cm 19 gauge coaxial needle was advanced to the mass.    After confirming the position the stylus was removed and 3 core biopsies were obtained.  The pathologist was present and confirmed adequacy of the samples.    The patient tolerated the procedure well.    Estimated blood loss: < 10ml.    Followup CT scan demonstrated no evidence of hematoma.  Impression: CT-guided pancreatic tail mass biopsy as described above.    Electronically signed by: Eb Wallace  Date:    11/22/2022  Time:    13:25         Medication List        START taking these medications      polyethylene glycol 17 gram Pwpk  Commonly known as: GLYCOLAX  Take 17 g by mouth 2 (two) times daily as needed.     simethicone 80 MG chewable tablet  Commonly known as: MYLICON  Take 1 tablet (80 mg total) by mouth 4 (four) times daily as needed for  Flatulence.     sucralfate 1 gram tablet  Commonly known as: CARAFATE  Take 1 tablet (1 g total) by mouth 4 (four) times daily before meals and nightly. for 7 days            CHANGE how you take these medications      pantoprazole 40 MG tablet  Commonly known as: PROTONIX  Take 1 tablet (40 mg total) by mouth once daily.  What changed: Another medication with the same name was removed. Continue taking this medication, and follow the directions you see here.            CONTINUE taking these medications      allopurinoL 300 MG tablet  Commonly known as: ZYLOPRIM     aspirin 81 MG EC tablet  Commonly known as: ECOTRIN     nitroGLYCERIN 0.4 MG SL tablet  Commonly known as: NITROSTAT     quinapriL 40 MG tablet  Commonly known as: ACCUPRIL     ranolazine 500 MG Tb12  Commonly known as: RANEXA     rosuvastatin 5 MG tablet  Commonly known as: CRESTOR     VITAMIN D2 50,000 unit Cap  Generic drug: ergocalciferol               Where to Get Your Medications        These medications were sent to [facility]/pharmacy #[id number] [place], [place]  [facility] [address] [address]      Phone: [phone]   sucralfate 1 gram tablet       Information about where to get these medications is not yet available    Ask your nurse or doctor about these medications  polyethylene glycol 17 gram Pwpk  simethicone 80 MG chewable tablet          Explained in detail to the patient about the discharge plan, medications, and follow-up visits. Pt understands and agrees with the treatment plan  Discharge Disposition: Home or Self Care   Discharged Condition: stable  Diet-   Dietary Orders (From admission, onward)       Start     Ordered    11/23/22 1659  Diet Adult Regular  Diet effective now         11/23/22 1658                   Medications Per DC med rec  Activities as tolerated   Follow-up Information       Mable Calloway PA-C Follow up on 12/2/2022.    Specialty: Physician Assistant  Why: Hospital follow up for liver  biopsy results and VCE results on 12/02/22 at 09:30 am.  Contact information:  [facility].  [place] [address]  [phone]               Trell Munoz II, MD Follow up.    Specialties: Oncology, Hematology and Oncology  Why: As scheduled  Contact information:  [address] [facility] [address]  [address]  [facility] [address]  [phone]                           For further questions contact hospitalist office    Discharge time 33 minutes    For worsening symptoms, chest pain, shortness of breath, increased abdominal pain, high grade fever, stroke or stroke like symptoms, immediately go to the nearest Emergency Room or call 911 as soon as possible.      Dominic Floyd M.D, on 11/24/2022. at 9:21 AM.

## 2022-11-25 LAB
BACTERIA BLD CULT: NORMAL
BACTERIA BLD CULT: NORMAL
DHEA SERPL-MCNC: NORMAL
ESTROGEN SERPL-MCNC: NORMAL PG/ML
INSULIN SERPL-ACNC: NORMAL U[IU]/ML
LAB AP CLINICAL INFORMATION: NORMAL
LAB AP GROSS DESCRIPTION: NORMAL
LAB AP INTRA OP: NORMAL
LAB AP REPORT FOOTNOTES: NORMAL
T3RU NFR SERPL: NORMAL %

## 2022-11-28 ENCOUNTER — PATIENT OUTREACH (OUTPATIENT)
Dept: ADMINISTRATIVE | Facility: CLINIC | Age: 82
End: 2022-11-28
Payer: MEDICARE

## 2022-11-28 DIAGNOSIS — D64.9 SYMPTOMATIC ANEMIA: Primary | ICD-10-CM

## 2022-11-28 NOTE — PROGRESS NOTES
C3 nurse attempted to contact Bari Vallecillo   for a TCC post hospital discharge follow up call. No answer at phone number listed.   Bad phone connection he asked for a call back later see if it would be better called x 2 on different phone numbers he had will try again.   He has apt with Mable Calloway on 12/2/202 @ulysses 9:30 AM

## 2022-11-29 NOTE — PROGRESS NOTES
Contacted Bari Vallecillo   for a TCC post hospital discharge follow up call. No answer at phone number listed.   Bad phone connection he asked for a call back later see if it would be better called x 2 on different phone numbers he had will try again.   He has apt with Mable Calloway on 12/2/202 @ulysses 9:30 AM

## 2022-12-02 ENCOUNTER — LAB VISIT (OUTPATIENT)
Dept: LAB | Facility: HOSPITAL | Age: 82
End: 2022-12-02
Attending: PHYSICIAN ASSISTANT
Payer: MEDICARE

## 2022-12-02 DIAGNOSIS — K86.89 MASS OF PANCREAS: ICD-10-CM

## 2022-12-02 DIAGNOSIS — D62 ANEMIA DUE TO BLOOD LOSS, ACUTE: ICD-10-CM

## 2022-12-02 DIAGNOSIS — K92.1 MELENA: Primary | ICD-10-CM

## 2022-12-02 DIAGNOSIS — C80.0 DISSEMINATED MALIGNANT NEOPLASM: ICD-10-CM

## 2022-12-02 LAB
ABS NEUT (OLG): 25.33 X10(3)/MCL (ref 2.1–9.2)
ALBUMIN SERPL-MCNC: 3.2 GM/DL (ref 3.4–4.8)
ALBUMIN/GLOB SERPL: 0.8 RATIO (ref 1.1–2)
ALP SERPL-CCNC: 133 UNIT/L (ref 40–150)
ALT SERPL-CCNC: 10 UNIT/L (ref 0–55)
ANISOCYTOSIS BLD QL SMEAR: ABNORMAL
AST SERPL-CCNC: 20 UNIT/L (ref 5–34)
BASOPHILS NFR BLD MANUAL: 0.3 X10(3)/MCL (ref 0–0.2)
BASOPHILS NFR BLD MANUAL: 1 %
BILIRUBIN DIRECT+TOT PNL SERPL-MCNC: 0.5 MG/DL
BUN SERPL-MCNC: 18.8 MG/DL (ref 8.4–25.7)
CALCIUM SERPL-MCNC: 9.7 MG/DL (ref 8.8–10)
CHLORIDE SERPL-SCNC: 100 MMOL/L (ref 98–107)
CO2 SERPL-SCNC: 26 MMOL/L (ref 23–31)
CREAT SERPL-MCNC: 1.06 MG/DL (ref 0.73–1.18)
EOSINOPHIL NFR BLD MANUAL: 1.19 X10(3)/MCL (ref 0–0.9)
EOSINOPHIL NFR BLD MANUAL: 4 %
ERYTHROCYTE [DISTWIDTH] IN BLOOD BY AUTOMATED COUNT: 16 % (ref 11.5–17)
GFR SERPLBLD CREATININE-BSD FMLA CKD-EPI: >60 MLS/MIN/1.73/M2
GLOBULIN SER-MCNC: 4 GM/DL (ref 2.4–3.5)
GLUCOSE SERPL-MCNC: 133 MG/DL (ref 82–115)
HCT VFR BLD AUTO: 31.7 % (ref 42–52)
HGB BLD-MCNC: 9.7 GM/DL (ref 14–18)
IMM GRANULOCYTES # BLD AUTO: 0.26 X10(3)/MCL (ref 0–0.04)
IMM GRANULOCYTES NFR BLD AUTO: 0.9 %
INSTRUMENT WBC (OLG): 29.8 X10(3)/MCL
LYMPHOCYTES NFR BLD MANUAL: 2.68 X10(3)/MCL
LYMPHOCYTES NFR BLD MANUAL: 9 %
MACROCYTES BLD QL SMEAR: ABNORMAL
MCH RBC QN AUTO: 30.7 PG (ref 27–31)
MCHC RBC AUTO-ENTMCNC: 30.6 MG/DL (ref 33–36)
MCV RBC AUTO: 100.3 FL (ref 80–94)
MONOCYTES NFR BLD MANUAL: 0.6 X10(3)/MCL (ref 0.1–1.3)
MONOCYTES NFR BLD MANUAL: 2 %
NEUTROPHILS NFR BLD MANUAL: 85 %
NRBC BLD AUTO-RTO: 0 %
PLATELET # BLD AUTO: 541 X10(3)/MCL (ref 130–400)
PLATELET # BLD EST: ABNORMAL 10*3/UL
PMV BLD AUTO: 8.4 FL (ref 7.4–10.4)
POTASSIUM SERPL-SCNC: 5.3 MMOL/L (ref 3.5–5.1)
PROT SERPL-MCNC: 7.2 GM/DL (ref 5.8–7.6)
RBC # BLD AUTO: 3.16 X10(6)/MCL (ref 4.7–6.1)
RBC MORPH BLD: ABNORMAL
SODIUM SERPL-SCNC: 136 MMOL/L (ref 136–145)
WBC # SPEC AUTO: 29.8 X10(3)/MCL (ref 4.5–11.5)

## 2022-12-02 PROCEDURE — 36415 COLL VENOUS BLD VENIPUNCTURE: CPT

## 2022-12-02 PROCEDURE — 85007 BL SMEAR W/DIFF WBC COUNT: CPT

## 2022-12-02 PROCEDURE — 80053 COMPREHEN METABOLIC PANEL: CPT

## 2022-12-12 ENCOUNTER — TELEPHONE (OUTPATIENT)
Dept: HEMATOLOGY/ONCOLOGY | Facility: CLINIC | Age: 82
End: 2022-12-12
Payer: MEDICARE

## 2022-12-13 ENCOUNTER — ANESTHESIA EVENT (OUTPATIENT)
Dept: SURGERY | Facility: HOSPITAL | Age: 82
End: 2022-12-13
Payer: MEDICARE

## 2022-12-14 ENCOUNTER — ANESTHESIA (OUTPATIENT)
Dept: SURGERY | Facility: HOSPITAL | Age: 82
End: 2022-12-14
Payer: MEDICARE

## 2022-12-14 ENCOUNTER — HOSPITAL ENCOUNTER (OUTPATIENT)
Facility: HOSPITAL | Age: 82
Discharge: HOME | End: 2022-12-14
Attending: INTERNAL MEDICINE | Admitting: INTERNAL MEDICINE
Payer: MEDICARE

## 2022-12-14 VITALS
WEIGHT: 211.88 LBS | SYSTOLIC BLOOD PRESSURE: 122 MMHG | HEART RATE: 84 BPM | OXYGEN SATURATION: 97 % | RESPIRATION RATE: 16 BRPM | BODY MASS INDEX: 26.35 KG/M2 | DIASTOLIC BLOOD PRESSURE: 69 MMHG | HEIGHT: 75 IN

## 2022-12-14 DIAGNOSIS — R16.0 MASS OF MULTIPLE SITES OF LIVER: ICD-10-CM

## 2022-12-14 DIAGNOSIS — R93.3 GASTROINTESTINAL TRACT IMAGING ABNORMALITY: ICD-10-CM

## 2022-12-14 DIAGNOSIS — K86.89 MASS OF PANCREAS: ICD-10-CM

## 2022-12-14 LAB
ALBUMIN SERPL-MCNC: 2.8 G/DL (ref 3.4–4.8)
ALBUMIN/GLOB SERPL: 0.6 RATIO (ref 1.1–2)
ALP SERPL-CCNC: 133 UNIT/L (ref 40–150)
ALT SERPL-CCNC: 10 UNIT/L (ref 0–55)
AST SERPL-CCNC: 33 UNIT/L (ref 5–34)
BASOPHILS # BLD AUTO: 0.12 X10(3)/MCL (ref 0–0.2)
BASOPHILS NFR BLD AUTO: 0.4 %
BILIRUBIN DIRECT+TOT PNL SERPL-MCNC: 0.5 MG/DL
BUN SERPL-MCNC: 19.1 MG/DL (ref 8.4–25.7)
CALCIUM SERPL-MCNC: 9.9 MG/DL (ref 8.8–10)
CHLORIDE SERPL-SCNC: 98 MMOL/L (ref 98–107)
CO2 SERPL-SCNC: 25 MMOL/L (ref 23–31)
CREAT SERPL-MCNC: 1.11 MG/DL (ref 0.73–1.18)
EOSINOPHIL # BLD AUTO: 0.37 X10(3)/MCL (ref 0–0.9)
EOSINOPHIL NFR BLD AUTO: 1.3 %
ERYTHROCYTE [DISTWIDTH] IN BLOOD BY AUTOMATED COUNT: 16.2 % (ref 11.6–14.4)
GFR SERPLBLD CREATININE-BSD FMLA CKD-EPI: >60 MLS/MIN/1.73/M2
GLOBULIN SER-MCNC: 4.8 GM/DL (ref 2.4–3.5)
GLUCOSE SERPL-MCNC: 140 MG/DL (ref 82–115)
HCT VFR BLD AUTO: 25.1 % (ref 42–52)
HGB BLD-MCNC: 7.9 GM/DL (ref 14–18)
IMM GRANULOCYTES # BLD AUTO: 0.23 X10(3)/MCL (ref 0–0.04)
IMM GRANULOCYTES NFR BLD AUTO: 0.8 %
INR BLD: 1.11 (ref 0–1.3)
LYMPHOCYTES # BLD AUTO: 1.92 X10(3)/MCL (ref 0.6–4.6)
LYMPHOCYTES NFR BLD AUTO: 6.7 %
MCH RBC QN AUTO: 31 PG
MCHC RBC AUTO-ENTMCNC: 31.5 MG/DL (ref 33–36)
MCV RBC AUTO: 98.4 FL (ref 80–94)
MONOCYTES # BLD AUTO: 1.77 X10(3)/MCL (ref 0.1–1.3)
MONOCYTES NFR BLD AUTO: 6.2 %
NEUTROPHILS # BLD AUTO: 24.11 X10(3)/MCL (ref 2.1–9.2)
NEUTROPHILS NFR BLD AUTO: 84.6 %
NRBC BLD AUTO-RTO: 0 % (ref 0–1)
PLATELET # BLD AUTO: 477 X10(3)/MCL (ref 140–371)
PMV BLD AUTO: 9.4 FL (ref 9.4–12.4)
POTASSIUM SERPL-SCNC: 5.7 MMOL/L (ref 3.5–5.1)
PROT SERPL-MCNC: 7.6 GM/DL (ref 5.8–7.6)
PROTHROMBIN TIME: 14.2 SECONDS (ref 12.5–14.5)
RBC # BLD AUTO: 2.55 X10(6)/MCL (ref 4.7–6.1)
SODIUM SERPL-SCNC: 134 MMOL/L (ref 136–145)
WBC # SPEC AUTO: 28.5 X10(3)/MCL (ref 4.5–11.5)

## 2022-12-14 PROCEDURE — 36415 COLL VENOUS BLD VENIPUNCTURE: CPT | Performed by: INTERNAL MEDICINE

## 2022-12-14 PROCEDURE — 27201423 OPTIME MED/SURG SUP & DEVICES STERILE SUPPLY: Performed by: INTERNAL MEDICINE

## 2022-12-14 PROCEDURE — 37000008 HC ANESTHESIA 1ST 15 MINUTES: Performed by: INTERNAL MEDICINE

## 2022-12-14 PROCEDURE — 63600175 PHARM REV CODE 636 W HCPCS: Performed by: NURSE ANESTHETIST, CERTIFIED REGISTERED

## 2022-12-14 PROCEDURE — 43242 EGD US FINE NEEDLE BX/ASPIR: CPT | Performed by: INTERNAL MEDICINE

## 2022-12-14 PROCEDURE — 85610 PROTHROMBIN TIME: CPT | Performed by: INTERNAL MEDICINE

## 2022-12-14 PROCEDURE — C1726 CATH, BAL DIL, NON-VASCULAR: HCPCS | Performed by: INTERNAL MEDICINE

## 2022-12-14 PROCEDURE — 37000009 HC ANESTHESIA EA ADD 15 MINS: Performed by: INTERNAL MEDICINE

## 2022-12-14 PROCEDURE — 88173 CYTOPATH EVAL FNA REPORT: CPT | Performed by: INTERNAL MEDICINE

## 2022-12-14 PROCEDURE — 25000003 PHARM REV CODE 250: Performed by: INTERNAL MEDICINE

## 2022-12-14 PROCEDURE — 25000003 PHARM REV CODE 250: Performed by: NURSE ANESTHETIST, CERTIFIED REGISTERED

## 2022-12-14 PROCEDURE — 80053 COMPREHEN METABOLIC PANEL: CPT | Performed by: INTERNAL MEDICINE

## 2022-12-14 PROCEDURE — 85025 COMPLETE CBC W/AUTO DIFF WBC: CPT | Performed by: INTERNAL MEDICINE

## 2022-12-14 RX ORDER — LEVOFLOXACIN 5 MG/ML
INJECTION, SOLUTION INTRAVENOUS
Status: DISCONTINUED | OUTPATIENT
Start: 2022-12-14 | End: 2022-12-14

## 2022-12-14 RX ORDER — HYDROCODONE BITARTRATE AND ACETAMINOPHEN 5; 325 MG/1; MG/1
1 TABLET ORAL
Status: DISCONTINUED | OUTPATIENT
Start: 2022-12-14 | End: 2022-12-14 | Stop reason: HOSPADM

## 2022-12-14 RX ORDER — LEVOFLOXACIN 5 MG/ML
500 INJECTION, SOLUTION INTRAVENOUS ONCE
Status: DISCONTINUED | OUTPATIENT
Start: 2022-12-14 | End: 2022-12-14 | Stop reason: HOSPADM

## 2022-12-14 RX ORDER — LIDOCAINE HYDROCHLORIDE 20 MG/ML
INJECTION INTRAVENOUS
Status: DISCONTINUED | OUTPATIENT
Start: 2022-12-14 | End: 2022-12-14

## 2022-12-14 RX ORDER — GLYCOPYRROLATE 0.2 MG/ML
INJECTION INTRAMUSCULAR; INTRAVENOUS
Status: DISCONTINUED | OUTPATIENT
Start: 2022-12-14 | End: 2022-12-14

## 2022-12-14 RX ORDER — PROPOFOL 10 MG/ML
VIAL (ML) INTRAVENOUS
Status: DISCONTINUED | OUTPATIENT
Start: 2022-12-14 | End: 2022-12-14

## 2022-12-14 RX ORDER — PROPOFOL 10 MG/ML
VIAL (ML) INTRAVENOUS CONTINUOUS PRN
Status: DISCONTINUED | OUTPATIENT
Start: 2022-12-14 | End: 2022-12-14

## 2022-12-14 RX ADMIN — LEVOFLOXACIN 500 MG: 500 INJECTION, SOLUTION INTRAVENOUS at 11:12

## 2022-12-14 RX ADMIN — Medication 50 MCG/KG/MIN: at 10:12

## 2022-12-14 RX ADMIN — LIDOCAINE HYDROCHLORIDE 60 MG: 20 INJECTION INTRAVENOUS at 11:12

## 2022-12-14 RX ADMIN — HYDROCODONE BITARTRATE AND ACETAMINOPHEN 1 TABLET: 5; 325 TABLET ORAL at 12:12

## 2022-12-14 RX ADMIN — LIDOCAINE HYDROCHLORIDE 80 MG: 20 INJECTION INTRAVENOUS at 10:12

## 2022-12-14 RX ADMIN — GLYCOPYRROLATE 0.1 MG: 0.2 INJECTION INTRAMUSCULAR; INTRAVENOUS at 10:12

## 2022-12-14 RX ADMIN — PROPOFOL 50 MG: 10 INJECTION, EMULSION INTRAVENOUS at 10:12

## 2022-12-14 RX ADMIN — PROPOFOL 40 MG: 10 INJECTION, EMULSION INTRAVENOUS at 10:12

## 2022-12-14 RX ADMIN — Medication 125 MCG/KG/MIN: at 10:12

## 2022-12-14 RX ADMIN — PROPOFOL 60 MG: 10 INJECTION, EMULSION INTRAVENOUS at 10:12

## 2022-12-14 RX ADMIN — SODIUM CHLORIDE, SODIUM GLUCONATE, SODIUM ACETATE, POTASSIUM CHLORIDE AND MAGNESIUM CHLORIDE: 526; 502; 368; 37; 30 INJECTION, SOLUTION INTRAVENOUS at 10:12

## 2022-12-14 NOTE — H&P
Endoscopy History and Physical    PCP - Iam Rojo MD    Procedure - EUS  ASA & Mallampati - per anesthesia      HPI:  This is a 82 y.o. male here for evaluation of a large LUQ mass and presumed liver mets.      ROS:  Constitutional: No fevers, chills, No weight loss  ENT: No allergies  CV: No chest pain  Pulm: No shortness of breath  GI: see HPI  Derm: No rash    Medical History:  has a past medical history of Acid reflux, Coronary artery disease, Pancreatic mass, and Sleep apnea.    Surgical History:  has a past surgical history that includes Joint replacement; Esophagogastroduodenoscopy (N/A, 11/14/2022); bilatteral shoulder replacement; arthoscopic surgery on a knee; and angfiogram with stent placement.    Family History: family history is not on file.     Social History:  reports that he has never smoked. He has never used smokeless tobacco. He reports that he does not currently use alcohol. He reports that he does not use drugs.    Review of patient's allergies indicates:  No Known Allergies    Medications:   Medications Prior to Admission   Medication Sig Dispense Refill Last Dose    aspirin (ECOTRIN) 81 MG EC tablet Take 81 mg by mouth once daily.   12/13/2022    pantoprazole (PROTONIX) 40 MG tablet Take 1 tablet (40 mg total) by mouth once daily. 30 tablet 11 12/13/2022 at 0600    quinapriL (ACCUPRIL) 40 MG tablet Take 1 tablet by mouth once daily.   12/13/2022 at 0600    ranolazine (RANEXA) 500 MG Tb12 Take 1 tablet by mouth 2 (two) times a day.   12/13/2022 at 2000    rosuvastatin (CRESTOR) 5 MG tablet Take 1 tablet by mouth once daily.   12/13/2022 at 2000    allopurinoL (ZYLOPRIM) 300 MG tablet Take 1 tablet by mouth once daily.       ergocalciferol (ERGOCALCIFEROL) 50,000 unit Cap Take 1 capsule by mouth once a week.       nitroGLYCERIN (NITROSTAT) 0.4 MG SL tablet Place 0.4 mg under the tongue.       polyethylene glycol (GLYCOLAX) 17 gram PwPk Take 17 g by mouth 2 (two) times daily as needed.  (Patient not taking: Reported on 11/29/2022)  0     simethicone (MYLICON) 80 MG chewable tablet Take 1 tablet (80 mg total) by mouth 4 (four) times daily as needed for Flatulence.  0          Objective Findings:    Vital Signs: see nursing notes  Physical Exam:  General Appearance: Well appearing in no acute distress  Eyes:    No scleral icterus  ENT: Neck supple  Lungs: CTA anteriorly  Heart:  S1, S2 normal, no murmurs heard  Abdomen: Soft, non tender, non distended with positive bowel sounds. No hepatosplenomegaly, ascites, or mass  Extremities: no edema  Skin: No rash      Labs:  Lab Results   Component Value Date    WBC 29.8 (H) 12/02/2022    HGB 9.7 (L) 12/02/2022    HCT 31.7 (L) 12/02/2022     (H) 12/02/2022    ALT 10 12/02/2022    AST 20 12/02/2022     12/02/2022    K 5.3 (H) 12/02/2022    CREATININE 1.06 12/02/2022    BUN 18.8 12/02/2022    CO2 26 12/02/2022    INR 1.23 11/20/2022       I have explained the risks and benefits of endoscopy procedures to the patient including but not limited to bleeding, perforation, infection, and death.    David Lobo MD

## 2022-12-14 NOTE — ANESTHESIA PREPROCEDURE EVALUATION
12/14/2022  Bair Vallecillo is a 82 y.o., male with pancreatic mass and multiple liver masses for EGD and EUS guided biopsy.  Multiple other medical problems as noted here, including CAD,ANA, GERD.  He is feeling well today, conversant and calm.  He denies cardiopulmonary complaints.  Reports that he has been stable from a cardiac standpoint for years.      Pre-op Assessment    I have reviewed the Patient Summary Reports.     I have reviewed the Nursing Notes. I have reviewed the NPO Status.   I have reviewed the Medications.     Review of Systems  Anesthesia Hx:  No problems with previous Anesthesia  Denies Family Hx of Anesthesia complications.   Denies Personal Hx of Anesthesia complications.   Cardiovascular:  Cardiovascular Normal CAD       Pulmonary:   Sleep Apnea    Hepatic/GI:   GERD Pancreatic mass, liver mass       Physical Exam  General: Well nourished, Cooperative, Alert and Oriented    Airway:  Mallampati: II   Mouth Opening: Normal  TM Distance: Normal  Tongue: Normal  Neck ROM: Normal ROM    Chest/Lungs:  Clear to auscultation, Normal Respiratory Rate    Heart:  Rate: Normal  Rhythm: Regular Rhythm        Anesthesia Plan  Type of Anesthesia, risks & benefits discussed:    Anesthesia Type: Gen Natural Airway  Intra-op Monitoring Plan: Standard ASA Monitors  Post Op Pain Control Plan: multimodal analgesia  Induction:  IV  Informed Consent: Informed consent signed with the Patient and all parties understand the risks and agree with anesthesia plan.  All questions answered.   ASA Score: 3  Day of Surgery Review of History & Physical: H&P Update referred to the surgeon/provider.    Ready For Surgery From Anesthesia Perspective.     .

## 2022-12-14 NOTE — PROVATION PATIENT INSTRUCTIONS
Discharge Summary/Instructions after an Endoscopic Procedure  Patient Name: Bari Vallecillo  Patient MRN: [id number]  Patient YOB: 1940 Wednesday, December 14, 2022  David Lobo MD  Dear patient,  As a result of recent federal legislation (The Federal Cures Act), you may   receive lab or pathology results from your procedure in your [facility]   account before your physician is able to contact you. Your physician or   their representative will relay the results to you with their   recommendations at their soonest availability.  Thank you,  RESTRICTIONS:  During your procedure today, you received medications for sedation.  These   medications may affect your judgment, balance and coordination.  Therefore,   for 24 hours, you have the following restrictions:   - DO NOT drive a car, operate machinery, make legal/financial decisions,   sign important papers or drink alcohol.    ACTIVITY:  Today: no heavy lifting, straining or running due to procedural   sedation/anesthesia.  The following day: return to full activity including work.  DIET:  Eat and drink normally unless instructed otherwise.     TREATMENT FOR COMMON SIDE EFFECTS:  - Mild abdominal pain, nausea, belching, bloating or excessive gas:  rest,   eat lightly and use a heating pad.  - Sore Throat: treat with throat lozenges and/or gargle with warm salt   water.  - Because air was used during the procedure, expelling large amounts of air   from your rectum or belching is normal.  - If a bowel prep was taken, you may not have a bowel movement for 1-3 days.    This is normal.  SYMPTOMS TO WATCH FOR AND REPORT TO YOUR PHYSICIAN:  1. Abdominal pain or bloating, other than gas cramps.  2. Chest pain.  3. Back pain.  4. Signs of infection such as: chills or fever occurring within 24 hours   after the procedure.  5. Rectal bleeding, which would show as bright red, maroon, or black stools.   (A tablespoon of blood from the rectum is not serious, especially  if   hemorrhoids are present.)  6. Vomiting.  7. Weakness or dizziness.  GO DIRECTLY TO THE NEAREST EMERGENCY ROOM IF YOU HAVE ANY OF THE FOLLOWING:      Difficulty breathing              Chills and/or fever over 101 F   Persistent vomiting and/or vomiting blood   Severe abdominal pain   Severe chest pain   Black, tarry stools   Bleeding- more than one tablespoon   Any other symptom or condition that you feel may need urgent attention  Your doctor recommends these additional instructions:  If any biopsies were taken, your doctors clinic will contact you in 1 to 2   weeks with any results.  - Discharge patient to home (with spouse).   - Low residue diet today.   - Continue present medications.   - Observe patient's clinical course.   - Await cytology results.   - Return to referring physician as previously scheduled.   - Appointment with Dr. Munoz, oncologist on Monday as scheduled.   - The findings and recommendations were discussed with the patient and their   family.  For questions, problems or results please call your physician - David Lobo MD at Work:  [phone].  [facility], EMERGENCY ROOM PHONE NUMBER: [phone]  IF A COMPLICATION OR EMERGENCY SITUATION ARISES AND YOU ARE UNABLE TO REACH   YOUR PHYSICIAN - GO DIRECTLY TO THE EMERGENCY ROOM.  David Lobo MD  12/14/2022 11:46:06 AM  This report has been verified and signed electronically.  Dear patient,  As a result of recent federal legislation (The Federal Cures Act), you may   receive lab or pathology results from your procedure in your [facility]   account before your physician is able to contact you. Your physician or   their representative will relay the results to you with their   recommendations at their soonest availability.  Thank you,  PROVATION

## 2022-12-14 NOTE — TRANSFER OF CARE
"Anesthesia Transfer of Care Note    Patient: Bari Vallecillo    Procedure(s) Performed: Procedure(s) (LRB):  Upper EUS w/poss FNA (N/A)  EGD (ESOPHAGOGASTRODUODENOSCOPY) (N/A)    Patient location: [facility]    Anesthesia Type: general    Transport from OR: Transported from OR on room air with adequate spontaneous ventilation    Post pain: adequate analgesia    Post assessment: no apparent anesthetic complications    Post vital signs: stable    Level of consciousness: awake and alert    Nausea/Vomiting: no nausea/vomiting    Complications: none    Transfer of care protocol was followed      Last vitals:   Visit Vitals  Ht 6' 3" (1.905 m)   Wt 96.1 kg (211 lb 13.8 oz)   BMI 26.48 kg/m²     "
your pcp

## 2022-12-14 NOTE — ANESTHESIA POSTPROCEDURE EVALUATION
Anesthesia Post Evaluation    Patient: Bari Vallecillo    Procedure(s) Performed: Procedure(s) (LRB):  Upper EUS w/poss FNA (N/A)  EGD (ESOPHAGOGASTRODUODENOSCOPY) (N/A)    Final Anesthesia Type: general      Patient location during evaluation: OPS  Patient participation: Yes- Able to Participate  Level of consciousness: awake and alert  Post-procedure vital signs: reviewed and stable  Pain management: adequate  Airway patency: patent    PONV status at discharge: No PONV  Anesthetic complications: no      Cardiovascular status: hemodynamically stable  Respiratory status: room air  Hydration status: euvolemic  Follow-up not needed.          Vitals Value Taken Time   /65 12/14/22 1143   Temp 37 12/14/22 1143   Pulse 65 12/14/22 1143   Resp 14 12/14/22 1143   SpO2 99 12/14/22 1143         No case tracking events are documented in the log.      Pain/Bijan Score: No data recorded

## 2022-12-16 LAB
DHEA SERPL-MCNC: NORMAL
ESTROGEN SERPL-MCNC: NORMAL PG/ML
INSULIN SERPL-ACNC: NORMAL U[IU]/ML
LAB AP CLINICAL INFORMATION: NORMAL
LAB AP GROSS DESCRIPTION: NORMAL
LAB AP INTRA OP: NORMAL
LAB AP REPORT FOOTNOTES: NORMAL
T3RU NFR SERPL: NORMAL %

## 2022-12-19 ENCOUNTER — OFFICE VISIT (OUTPATIENT)
Dept: HEMATOLOGY/ONCOLOGY | Facility: CLINIC | Age: 82
End: 2022-12-19
Payer: MEDICARE

## 2022-12-19 VITALS
BODY MASS INDEX: 26.78 KG/M2 | DIASTOLIC BLOOD PRESSURE: 61 MMHG | WEIGHT: 215.38 LBS | HEART RATE: 90 BPM | HEIGHT: 75 IN | TEMPERATURE: 99 F | OXYGEN SATURATION: 98 % | SYSTOLIC BLOOD PRESSURE: 94 MMHG | RESPIRATION RATE: 18 BRPM

## 2022-12-19 DIAGNOSIS — D64.9 ANEMIA, UNSPECIFIED TYPE: ICD-10-CM

## 2022-12-19 DIAGNOSIS — R19.00 ABDOMINAL MASS, UNSPECIFIED ABDOMINAL LOCATION: Primary | ICD-10-CM

## 2022-12-19 DIAGNOSIS — C80.1 UNDIFFERENTIATED CARCINOMA: ICD-10-CM

## 2022-12-19 LAB
GROUP & RH: NORMAL
INDIRECT COOMBS GEL: NORMAL

## 2022-12-19 PROCEDURE — 86850 RBC ANTIBODY SCREEN: CPT | Performed by: INTERNAL MEDICINE

## 2022-12-19 PROCEDURE — 99999 PR PBB SHADOW E&M-EST. PATIENT-LVL V: ICD-10-PCS | Mod: PBBFAC,,, | Performed by: INTERNAL MEDICINE

## 2022-12-19 PROCEDURE — 99215 OFFICE O/P EST HI 40 MIN: CPT | Mod: PBBFAC | Performed by: INTERNAL MEDICINE

## 2022-12-19 PROCEDURE — 99214 OFFICE O/P EST MOD 30 MIN: CPT | Mod: S$PBB,,, | Performed by: INTERNAL MEDICINE

## 2022-12-19 PROCEDURE — 86923 COMPATIBILITY TEST ELECTRIC: CPT | Performed by: INTERNAL MEDICINE

## 2022-12-19 PROCEDURE — 99214 PR OFFICE/OUTPT VISIT, EST, LEVL IV, 30-39 MIN: ICD-10-PCS | Mod: S$PBB,,, | Performed by: INTERNAL MEDICINE

## 2022-12-19 PROCEDURE — 36415 COLL VENOUS BLD VENIPUNCTURE: CPT | Performed by: INTERNAL MEDICINE

## 2022-12-19 PROCEDURE — 99999 PR PBB SHADOW E&M-EST. PATIENT-LVL V: CPT | Mod: PBBFAC,,, | Performed by: INTERNAL MEDICINE

## 2022-12-19 RX ORDER — OXYCODONE AND ACETAMINOPHEN 5; 325 MG/1; MG/1
1 TABLET ORAL EVERY 8 HOURS PRN
COMMUNITY
Start: 2022-12-15 | End: 2022-12-22 | Stop reason: SDUPTHER

## 2022-12-19 RX ORDER — HYDROCODONE BITARTRATE AND ACETAMINOPHEN 500; 5 MG/1; MG/1
TABLET ORAL ONCE
Status: CANCELLED | OUTPATIENT
Start: 2022-12-19 | End: 2022-12-19

## 2022-12-19 RX ORDER — ACETAMINOPHEN 325 MG/1
650 TABLET ORAL ONCE
Status: CANCELLED | OUTPATIENT
Start: 2022-12-19

## 2022-12-19 RX ORDER — DIPHENHYDRAMINE HYDROCHLORIDE 50 MG/ML
25 INJECTION INTRAMUSCULAR; INTRAVENOUS ONCE
Status: CANCELLED | OUTPATIENT
Start: 2022-12-19

## 2022-12-19 NOTE — PROGRESS NOTES
Subjective:       Patient ID: Bari Vallecillo is a 82 y.o. male.    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    Current Treatment:   Pending workup and diagnosis    Treatment History:  N/A    HPI:  Patient with a history of coronary artery disease underwent an EGD and colonoscopy in September of 2022 for a workup of abdominal pain, these were unremarkable.  On 11/11/2022, the patient was having persistent abdominal pain and had outpatient labs revealed leukocytosis.  Was referred to the emergency department.  He was admitted to the hospitalist service.  A CT of the abdomen and pelvis was obtained on 11/11/2022 and showed an aggressive mass in the left upper quadrant measuring 12 cm x 9 cm.  This involved the tail of the pancreas, the stomach, and the spleen.  There was metastasis seen in the liver.  CA 19 9 and CEA were done on 11/14/2022, CA 19 9 was elevated at 867.48, CEA was elevated at 3.74.  EGD on 11/14/2022 revealed normal esophagus and stomach.  There was an extrinsic mass noted to be compressing the body of the stomach.  A repeat CT of the abdomen and pelvis on 11/16/2022 showed similar findings.  There was presumed metastatic lesions at the right lower lobe, liver, and L1.  The patient was then discharged home on 11/17/2022 with plans for outpatient IR guided biopsy.  Prior to having this biopsy, the patient presented to the emergency department on 11/20/2022 with fatigue and weakness and continued dark stool.  He also was noted to have a fever of 102.4.  Repeat CT of the abdomen and pelvis on 11/20/2022 revealed unchanged appearance of the masslike lesion arising from the tail of the pancreas with known diffuse metastatic disease being unchanged.  The patient then underwent biopsy of the pancreatic tail mass on 11/22/2022.  Oncology consulted  further discussion.  I saw the patient on 11/22/2022.  At that initial consult, the patient stated that he continued to have weakness in dark stool.  EUS done on 12/14/2022 revealed extrinsic compression of the stomach, single metastatic lesion in the right lobe of the liver, many malignant-appearing lymph nodes, and an 8.9 x 10.0 cm mass in the peritoneal cavity, source of the mass and possible to determine due to distortion of anatomical landmarks.  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    Interval History:   Patient presents to clinic for scheduled hospital follow up.  Overall, he is doing okay.  He does continue to have gas and pain.  His family is present, they inquire about [facility] consult.      Past Medical History:   Diagnosis Date    Acid reflux     Coronary artery disease     Pancreatic mass     Sleep apnea       Past Surgical History:   Procedure Laterality Date    angfiogram with stent placement      arthoscopic surgery on a knee      bilatteral shoulder replacement      ESOPHAGOGASTRODUODENOSCOPY N/A 11/14/2022    Procedure: EGD (ESOPHAGOGASTRODUODENOSCOPY);  Surgeon: SOULEYMANE Nunn MD;  Location: [facility] ENDOSCOPY;  Service: Gastroenterology;  Laterality: N/A;    ESOPHAGOGASTRODUODENOSCOPY N/A 12/14/2022    Procedure: EGD (ESOPHAGOGASTRODUODENOSCOPY);  Surgeon: David Lobo MD;  Location: [facility];  Service: Gastroenterology;  Laterality: N/A;    JOINT REPLACEMENT      ULTRASOUND, ENDOSCOPIC, WITH FINE NEEDLE ASPIRATION N/A 12/14/2022    Procedure: Upper EUS w/poss FNA;  Surgeon: David Lobo MD;  Location: [facility];  Service: Gastroenterology;  Laterality: N/A;  LABS WITH PROCEDURE.     Social History     Socioeconomic History    Marital  status:    Tobacco Use    Smoking status: Never    Smokeless tobacco: Never   Substance and Sexual Activity    Alcohol use: Not Currently    Drug use: Never      History reviewed. No pertinent family history.   Review of patient's allergies indicates:  No Known Allergies   Review of Systems   Constitutional:  Positive for fatigue and fever. Negative for unexpected weight change.   HENT:  Negative for mouth dryness, nosebleeds and sore throat.    Respiratory:  Negative for chest tightness and shortness of breath.         Hemoptysis   Cardiovascular:  Negative for chest pain and palpitations.   Gastrointestinal:  Positive for abdominal pain and blood in stool. Negative for constipation and diarrhea.   Genitourinary:  Negative for difficulty urinating and dysuria.   Musculoskeletal:  Negative for arthralgias and back pain.   Integumentary:  Negative for rash.   Neurological:  Positive for weakness. Negative for headaches.   Hematological:  Negative for adenopathy.   Psychiatric/Behavioral:  Negative for agitation.        Objective:      Physical Exam  Vitals reviewed.   Constitutional:       General: He is awake.      Appearance: Normal appearance.   HENT:      Head: Normocephalic and atraumatic.      Right Ear: Hearing normal.      Left Ear: Hearing normal.      Nose: Nose normal.   Eyes:      General: Lids are normal. Vision grossly intact.      Extraocular Movements: Extraocular movements intact.      Conjunctiva/sclera: Conjunctivae normal.   Cardiovascular:      Rate and Rhythm: Normal rate and regular rhythm.      Pulses: Normal pulses.      Heart sounds: Normal heart sounds.   Pulmonary:      Effort: Pulmonary effort is normal.      Breath sounds: Normal breath sounds. No wheezing, rhonchi or rales.   Abdominal:      General: Bowel sounds are normal. There is distension.      Palpations: Abdomen is soft.      Tenderness: There is abdominal tenderness.   Musculoskeletal:      Cervical back: Full passive  range of motion without pain.      Right lower leg: No edema.      Left lower leg: No edema.   Lymphadenopathy:      Cervical: No cervical adenopathy.      Upper Body:      Right upper body: No supraclavicular or axillary adenopathy.      Left upper body: No supraclavicular or axillary adenopathy.   Skin:     General: Skin is warm.   Neurological:      General: No focal deficit present.      Mental Status: He is alert and oriented to person, place, and time.   Psychiatric:         Attention and Perception: Attention normal.         Mood and Affect: Mood and affect normal.         Behavior: Behavior is cooperative.       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        Plan:       Patient has findings concerning for a pancreatic versus gastric cancer with metastatic disease.  .    Patient underwent biopsy on 11/22/2022, this revealed necrotic cells.    Unfortunately, EUS that was done on 12/14/2022 could not define the source of the mass due to distorted anatomical landmarks, pathology only showed undifferentiated carcinoma    2 units of blood on 12/21/2022.  Will set up weekly labs    Will send referral to [facility]     CT scan of chest ordered due to hemoptysis, will call with results    Return to clinic in mid January.  The family knows to come in sooner if the patient has any clinical decline whatsoever.        Trell Munoz II, MD

## 2022-12-21 ENCOUNTER — HOSPITAL ENCOUNTER (OUTPATIENT)
Dept: RADIOLOGY | Facility: HOSPITAL | Age: 82
Discharge: HOME | End: 2022-12-21
Attending: INTERNAL MEDICINE
Payer: MEDICARE

## 2022-12-21 ENCOUNTER — INFUSION (OUTPATIENT)
Dept: INFUSION THERAPY | Facility: HOSPITAL | Age: 82
End: 2022-12-21
Attending: INTERNAL MEDICINE
Payer: MEDICARE

## 2022-12-21 VITALS
RESPIRATION RATE: 20 BRPM | OXYGEN SATURATION: 98 % | WEIGHT: 215.38 LBS | HEART RATE: 73 BPM | DIASTOLIC BLOOD PRESSURE: 70 MMHG | BODY MASS INDEX: 26.78 KG/M2 | HEIGHT: 75 IN | TEMPERATURE: 99 F | SYSTOLIC BLOOD PRESSURE: 124 MMHG

## 2022-12-21 DIAGNOSIS — R19.00 ABDOMINAL MASS, UNSPECIFIED ABDOMINAL LOCATION: ICD-10-CM

## 2022-12-21 DIAGNOSIS — C80.1 UNDIFFERENTIATED CARCINOMA: ICD-10-CM

## 2022-12-21 DIAGNOSIS — D64.9 ANEMIA, UNSPECIFIED TYPE: ICD-10-CM

## 2022-12-21 LAB
ABO + RH BLD: NORMAL
ABO + RH BLD: NORMAL
BLD PROD TYP BPU: NORMAL
BLD PROD TYP BPU: NORMAL
BLOOD UNIT EXPIRATION DATE: NORMAL
BLOOD UNIT EXPIRATION DATE: NORMAL
BLOOD UNIT TYPE CODE: 2800
BLOOD UNIT TYPE CODE: 2800
CROSSMATCH INTERPRETATION: NORMAL
CROSSMATCH INTERPRETATION: NORMAL
DISPENSE STATUS: NORMAL
DISPENSE STATUS: NORMAL
UNIT NUMBER: NORMAL
UNIT NUMBER: NORMAL

## 2022-12-21 PROCEDURE — 96374 THER/PROPH/DIAG INJ IV PUSH: CPT | Mod: 59

## 2022-12-21 PROCEDURE — 63600175 PHARM REV CODE 636 W HCPCS: Performed by: INTERNAL MEDICINE

## 2022-12-21 PROCEDURE — 25500020 PHARM REV CODE 255: Performed by: INTERNAL MEDICINE

## 2022-12-21 PROCEDURE — 96375 TX/PRO/DX INJ NEW DRUG ADDON: CPT

## 2022-12-21 PROCEDURE — 71260 CT THORAX DX C+: CPT | Mod: TC

## 2022-12-21 PROCEDURE — 25000003 PHARM REV CODE 250: Performed by: INTERNAL MEDICINE

## 2022-12-21 PROCEDURE — P9016 RBC LEUKOCYTES REDUCED: HCPCS | Performed by: INTERNAL MEDICINE

## 2022-12-21 PROCEDURE — 36430 TRANSFUSION BLD/BLD COMPNT: CPT

## 2022-12-21 RX ORDER — DIPHENHYDRAMINE HYDROCHLORIDE 50 MG/ML
25 INJECTION INTRAMUSCULAR; INTRAVENOUS ONCE
Status: COMPLETED | OUTPATIENT
Start: 2022-12-21 | End: 2022-12-21

## 2022-12-21 RX ORDER — ACETAMINOPHEN 325 MG/1
650 TABLET ORAL ONCE
Status: COMPLETED | OUTPATIENT
Start: 2022-12-21 | End: 2022-12-21

## 2022-12-21 RX ORDER — HYDROCODONE BITARTRATE AND ACETAMINOPHEN 500; 5 MG/1; MG/1
TABLET ORAL ONCE
Status: COMPLETED | OUTPATIENT
Start: 2022-12-21 | End: 2022-12-21

## 2022-12-21 RX ADMIN — IOPAMIDOL 100 ML: 755 INJECTION, SOLUTION INTRAVENOUS at 01:12

## 2022-12-21 RX ADMIN — DIPHENHYDRAMINE HYDROCHLORIDE 25 MG: 50 INJECTION, SOLUTION INTRAMUSCULAR; INTRAVENOUS at 08:12

## 2022-12-21 RX ADMIN — ACETAMINOPHEN 650 MG: 325 TABLET, FILM COATED ORAL at 08:12

## 2022-12-21 RX ADMIN — SODIUM CHLORIDE: 9 INJECTION, SOLUTION INTRAVENOUS at 08:12

## 2022-12-21 NOTE — PLAN OF CARE
Two units PRBC's transfused. Patient will report any adverse side effects. Will discharge in stable condition.

## 2022-12-22 DIAGNOSIS — C80.1 UNDIFFERENTIATED CARCINOMA: Primary | ICD-10-CM

## 2022-12-22 DIAGNOSIS — G89.3 CANCER RELATED PAIN: ICD-10-CM

## 2022-12-22 DIAGNOSIS — R19.00 ABDOMINAL MASS, UNSPECIFIED ABDOMINAL LOCATION: ICD-10-CM

## 2022-12-22 RX ORDER — OXYCODONE AND ACETAMINOPHEN 5; 325 MG/1; MG/1
1 TABLET ORAL EVERY 4 HOURS PRN
Qty: 120 TABLET | Refills: 0 | Status: SHIPPED | OUTPATIENT
Start: 2022-12-22

## 2022-12-22 NOTE — TELEPHONE ENCOUNTER
----- Message from Trell Munoz II, MD sent at 12/22/2022 11:45 AM CST -----  Regarding: RE: scan results  I called and spoke with the patient, informed him of his CT chest results.  He informed me that he had some dizziness and fell, but the dizziness is improving.  No other major issues to discuss. I informed him to let me know if dizziness does not improve, he said it was getting better.  Also, ok to send in pain meds.   ----- Message -----  From: Marcie Enamorado LPN  Sent: 12/22/2022   8:00 AM CST  To: Trell Munoz II, MD, Marcie Enamorado LPN  Subject: scan results                                     Call patient with CT chest results done on 12/21/2022  1:00 PM

## 2022-12-22 NOTE — PHYSICIAN QUERY
PT Name: Bari Vallecillo  MR #: [id number]    DOCUMENTATION CLARIFICATION   CDS:  Ivy Moser RN, BSN  Contact Information:  [email or web address]   This form is a permanent document in the medical record.     Query Date: December 22, 2022    By submitting this query, we are merely seeking further clarification of documentation.  Please utilize your independent clinical judgment when addressing the question(s) below.    The medical record contains the following:  Pathology Findings Location in Medical Record   TAIL OF PANCREAS LESION, CT-GUIDED BIOPSY:   NECROSIS. SEE COMMENT.    Comments:  Although the lesion most likely represents a poorly differentiated carcinoma, no viable tumor cells are identified. Degenerative cells and necrotic cell fragments are present. 11/25:  Pathology Report       Please clarify the pathology findings.  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    Form No. [id number]

## 2022-12-22 NOTE — PHYSICIAN QUERY
PT Name: Bari Vallecillo  MR #: [id number]    DOCUMENTATION CLARIFICATION     CDS:  Ivy Moser RN, BSN  Contact Information:  [email or web address]   This form is a permanent document in the medical record.     Query Date: December 22, 2022    By submitting this query, we are merely seeking further clarification of documentation.  Please utilize your independent clinical judgment when addressing the question(s) below.    The medical record contains the following:  Pathology Findings Location in Medical Record   TAIL OF PANCREAS LESION, CT-GUIDED BIOPSY:   NECROSIS. SEE COMMENT.     Comments:  Although the lesion most likely represents a poorly differentiated carcinoma, no viable tumor cells are identified. Degenerative cells and necrotic cell fragments are present. 11/25:  Pathology Report       Please clarify the pathology findings.            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    Form No. [id number]

## 2022-12-28 ENCOUNTER — OFFICE VISIT (OUTPATIENT)
Dept: HEMATOLOGY/ONCOLOGY | Facility: CLINIC | Age: 82
End: 2022-12-28
Payer: MEDICARE

## 2022-12-28 ENCOUNTER — LAB VISIT (OUTPATIENT)
Dept: LAB | Facility: HOSPITAL | Age: 82
End: 2022-12-28
Attending: INTERNAL MEDICINE
Payer: MEDICARE

## 2022-12-28 ENCOUNTER — INFUSION (OUTPATIENT)
Dept: INFUSION THERAPY | Facility: HOSPITAL | Age: 82
End: 2022-12-28
Attending: INTERNAL MEDICINE
Payer: MEDICARE

## 2022-12-28 VITALS
DIASTOLIC BLOOD PRESSURE: 63 MMHG | RESPIRATION RATE: 16 BRPM | TEMPERATURE: 99 F | OXYGEN SATURATION: 94 % | SYSTOLIC BLOOD PRESSURE: 110 MMHG | HEART RATE: 72 BPM

## 2022-12-28 DIAGNOSIS — R19.00 ABDOMINAL MASS, UNSPECIFIED ABDOMINAL LOCATION: Primary | ICD-10-CM

## 2022-12-28 DIAGNOSIS — D64.9 ANEMIA, UNSPECIFIED TYPE: ICD-10-CM

## 2022-12-28 DIAGNOSIS — G89.3 CANCER RELATED PAIN: ICD-10-CM

## 2022-12-28 DIAGNOSIS — R19.00 ABDOMINAL MASS, UNSPECIFIED ABDOMINAL LOCATION: ICD-10-CM

## 2022-12-28 DIAGNOSIS — C80.1 UNDIFFERENTIATED CARCINOMA: ICD-10-CM

## 2022-12-28 DIAGNOSIS — C80.1 CANCER OF UNKNOWN ORIGIN: Primary | ICD-10-CM

## 2022-12-28 LAB
ABO + RH BLD: NORMAL
ALBUMIN SERPL-MCNC: 2.6 G/DL (ref 3.4–4.8)
ALBUMIN/GLOB SERPL: 0.7 RATIO (ref 1.1–2)
ALP SERPL-CCNC: 157 UNIT/L (ref 40–150)
ALT SERPL-CCNC: 15 UNIT/L (ref 0–55)
AST SERPL-CCNC: 32 UNIT/L (ref 5–34)
BASOPHILS # BLD AUTO: 0.07 X10(3)/MCL (ref 0–0.2)
BASOPHILS NFR BLD AUTO: 0.1 %
BILIRUBIN DIRECT+TOT PNL SERPL-MCNC: 0.7 MG/DL
BLD PROD TYP BPU: NORMAL
BLOOD UNIT EXPIRATION DATE: NORMAL
BLOOD UNIT TYPE CODE: 2800
BUN SERPL-MCNC: 21.6 MG/DL (ref 8.4–25.7)
CALCIUM SERPL-MCNC: 9.3 MG/DL (ref 8.8–10)
CHLORIDE SERPL-SCNC: 99 MMOL/L (ref 98–107)
CO2 SERPL-SCNC: 25 MMOL/L (ref 23–31)
CREAT SERPL-MCNC: 1.25 MG/DL (ref 0.73–1.18)
CROSSMATCH INTERPRETATION: NORMAL
DISPENSE STATUS: NORMAL
EOSINOPHIL # BLD AUTO: 0.39 X10(3)/MCL (ref 0–0.9)
EOSINOPHIL NFR BLD AUTO: 0.8 %
ERYTHROCYTE [DISTWIDTH] IN BLOOD BY AUTOMATED COUNT: 16.3 % (ref 11.6–14.4)
GFR SERPLBLD CREATININE-BSD FMLA CKD-EPI: 58 MLS/MIN/1.73/M2
GLOBULIN SER-MCNC: 4 GM/DL (ref 2.4–3.5)
GLUCOSE SERPL-MCNC: 157 MG/DL (ref 82–115)
GROUP & RH: NORMAL
HCT VFR BLD AUTO: 23.9 % (ref 42–52)
HGB BLD-MCNC: 7.4 GM/DL (ref 14–18)
IMM GRANULOCYTES # BLD AUTO: 0.4 X10(3)/MCL (ref 0–0.04)
IMM GRANULOCYTES NFR BLD AUTO: 0.8 %
INDIRECT COOMBS GEL: NORMAL
LYMPHOCYTES # BLD AUTO: 2.57 X10(3)/MCL (ref 0.6–4.6)
LYMPHOCYTES NFR BLD AUTO: 5.3 %
MCH RBC QN AUTO: 30.3 PG
MCHC RBC AUTO-ENTMCNC: 31 MG/DL (ref 33–36)
MCV RBC AUTO: 98 FL (ref 80–94)
MONOCYTES # BLD AUTO: 2.06 X10(3)/MCL (ref 0.1–1.3)
MONOCYTES NFR BLD AUTO: 4.2 %
NEUTROPHILS # BLD AUTO: 43.38 X10(3)/MCL (ref 2.1–9.2)
NEUTROPHILS NFR BLD AUTO: 88.8 %
PLATELET # BLD AUTO: 444 X10(3)/MCL (ref 140–371)
PMV BLD AUTO: 8.1 FL (ref 9.4–12.4)
POTASSIUM SERPL-SCNC: 5.2 MMOL/L (ref 3.5–5.1)
PROT SERPL-MCNC: 6.6 GM/DL (ref 5.8–7.6)
RBC # BLD AUTO: 2.44 X10(6)/MCL (ref 4.7–6.1)
SODIUM SERPL-SCNC: 134 MMOL/L (ref 136–145)
UNIT NUMBER: NORMAL
WBC # SPEC AUTO: 48.9 X10(3)/MCL (ref 4.5–11.5)

## 2022-12-28 PROCEDURE — 25000003 PHARM REV CODE 250: Performed by: INTERNAL MEDICINE

## 2022-12-28 PROCEDURE — 99211 OFF/OP EST MAY X REQ PHY/QHP: CPT | Mod: PBBFAC,25 | Performed by: INTERNAL MEDICINE

## 2022-12-28 PROCEDURE — 96374 THER/PROPH/DIAG INJ IV PUSH: CPT

## 2022-12-28 PROCEDURE — 36430 TRANSFUSION BLD/BLD COMPNT: CPT

## 2022-12-28 PROCEDURE — P9016 RBC LEUKOCYTES REDUCED: HCPCS | Performed by: INTERNAL MEDICINE

## 2022-12-28 PROCEDURE — 99214 OFFICE O/P EST MOD 30 MIN: CPT | Mod: S$PBB,,, | Performed by: INTERNAL MEDICINE

## 2022-12-28 PROCEDURE — 99999 PR PBB SHADOW E&M-EST. PATIENT-LVL I: CPT | Mod: PBBFAC,,, | Performed by: INTERNAL MEDICINE

## 2022-12-28 PROCEDURE — 36415 COLL VENOUS BLD VENIPUNCTURE: CPT | Performed by: INTERNAL MEDICINE

## 2022-12-28 PROCEDURE — 85025 COMPLETE CBC W/AUTO DIFF WBC: CPT

## 2022-12-28 PROCEDURE — 86901 BLOOD TYPING SEROLOGIC RH(D): CPT | Performed by: INTERNAL MEDICINE

## 2022-12-28 PROCEDURE — 63600175 PHARM REV CODE 636 W HCPCS: Performed by: INTERNAL MEDICINE

## 2022-12-28 PROCEDURE — 99999 PR PBB SHADOW E&M-EST. PATIENT-LVL I: ICD-10-PCS | Mod: PBBFAC,,, | Performed by: INTERNAL MEDICINE

## 2022-12-28 PROCEDURE — 36415 COLL VENOUS BLD VENIPUNCTURE: CPT

## 2022-12-28 PROCEDURE — 80053 COMPREHEN METABOLIC PANEL: CPT

## 2022-12-28 PROCEDURE — 99214 PR OFFICE/OUTPT VISIT, EST, LEVL IV, 30-39 MIN: ICD-10-PCS | Mod: S$PBB,,, | Performed by: INTERNAL MEDICINE

## 2022-12-28 PROCEDURE — 86923 COMPATIBILITY TEST ELECTRIC: CPT | Performed by: INTERNAL MEDICINE

## 2022-12-28 RX ORDER — HYDROCODONE BITARTRATE AND ACETAMINOPHEN 500; 5 MG/1; MG/1
TABLET ORAL ONCE
Status: CANCELLED | OUTPATIENT
Start: 2022-12-28 | End: 2022-12-28

## 2022-12-28 RX ORDER — ACETAMINOPHEN 325 MG/1
650 TABLET ORAL ONCE
Status: COMPLETED | OUTPATIENT
Start: 2022-12-28 | End: 2022-12-28

## 2022-12-28 RX ORDER — DIPHENHYDRAMINE HYDROCHLORIDE 50 MG/ML
25 INJECTION INTRAMUSCULAR; INTRAVENOUS ONCE
Status: COMPLETED | OUTPATIENT
Start: 2022-12-28 | End: 2022-12-28

## 2022-12-28 RX ORDER — HYDROCODONE BITARTRATE AND ACETAMINOPHEN 500; 5 MG/1; MG/1
TABLET ORAL ONCE
Status: DISCONTINUED | OUTPATIENT
Start: 2022-12-28 | End: 2022-12-28 | Stop reason: HOSPADM

## 2022-12-28 RX ORDER — DIPHENHYDRAMINE HYDROCHLORIDE 50 MG/ML
25 INJECTION INTRAMUSCULAR; INTRAVENOUS ONCE
Status: CANCELLED | OUTPATIENT
Start: 2022-12-28

## 2022-12-28 RX ORDER — ACETAMINOPHEN 325 MG/1
650 TABLET ORAL ONCE
Status: CANCELLED | OUTPATIENT
Start: 2022-12-28

## 2022-12-28 RX ADMIN — DIPHENHYDRAMINE HYDROCHLORIDE 25 MG: 50 INJECTION, SOLUTION INTRAMUSCULAR; INTRAVENOUS at 10:12

## 2022-12-28 RX ADMIN — ACETAMINOPHEN 650 MG: 325 TABLET, FILM COATED ORAL at 10:12

## 2022-12-28 NOTE — PROGRESS NOTES
Subjective:       Patient ID: Bari Vallecillo is a 82 y.o. male.    Chief Complaint: No chief complaint on file.      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    Current Treatment:   Pending workup and diagnosis    Treatment History:  N/A    HPI:  Patient with a history of coronary artery disease underwent an EGD and colonoscopy in September of 2022 for a workup of abdominal pain, these were unremarkable.  On 11/11/2022, the patient was having persistent abdominal pain and had outpatient labs revealed leukocytosis.  Was referred to the emergency department.  He was admitted to the hospitalist service.  A CT of the abdomen and pelvis was obtained on 11/11/2022 and showed an aggressive mass in the left upper quadrant measuring 12 cm x 9 cm.  This involved the tail of the pancreas, the stomach, and the spleen.  There was metastasis seen in the liver.  CA 19 9 and CEA were done on 11/14/2022, CA 19 9 was elevated at 867.48, CEA was elevated at 3.74.  EGD on 11/14/2022 revealed normal esophagus and stomach.  There was an extrinsic mass noted to be compressing the body of the stomach.  A repeat CT of the abdomen and pelvis on 11/16/2022 showed similar findings.  There was presumed metastatic lesions at the right lower lobe, liver, and L1.  The patient was then discharged home on 11/17/2022 with plans for outpatient IR guided biopsy.  Prior to having this biopsy, the patient presented to the emergency department on 11/20/2022 with fatigue and weakness and continued dark stool.  He also was noted to have a fever of 102.4.  Repeat CT of the abdomen and pelvis on 11/20/2022 revealed unchanged appearance of the masslike lesion arising from the tail of the pancreas with known diffuse metastatic disease being unchanged.  The patient then underwent biopsy of the pancreatic tail mass on 11/22/2022.  Oncology consulted further discussion.  I saw the patient on 11/22/2022.  At that  initial consult, the patient stated that he continued to have weakness in dark stool.  EUS done on 12/14/2022 revealed extrinsic compression of the stomach, single metastatic lesion in the right lobe of the liver, many malignant-appearing lymph nodes, and an 8.9 x 10.0 cm mass in the peritoneal cavity, source of the mass and possible to determine due to distortion of anatomical landmarks.  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    Interval History:   Patient presents to clinic today for blood work in ask to be seen by our clinic.  The patient has an appointment on 01/10/2023 at [facility].  The patient and his family stated that they may want to just consider starting treatment here.  I would a very long conversation with them stating that we are unsure of the true origin of this disease and we would like [facility] to weigh in, if anything for a 2nd opinion on the pathology to determine a primary and see exactly what type of chemotherapy the patient may be able to get.  I explained that we can try to help control his pain until we find out exactly the origin of his cancer.  The patient and his family voiced understanding.  As mentioned, I strongly encouraged they keep their appointment at [facility].  One comment that the family and the patient made was that he does not know if he could tolerate the ride to [place].  I explained to them that if he can not tolerate the right to [place], I would be very concerned about him being able to tolerate chemotherapy.  They voiced understanding.    Past Medical History:   Diagnosis Date    Acid reflux     Coronary artery disease     Pancreatic mass     Sleep apnea       Past Surgical History:   Procedure Laterality Date     angfiogram with stent placement      arthoscopic surgery on a knee      bilatteral shoulder replacement      ESOPHAGOGASTRODUODENOSCOPY N/A 11/14/2022    Procedure: EGD (ESOPHAGOGASTRODUODENOSCOPY);  Surgeon: SOULEYMANE Nunn MD;  Location: [facility] ENDOSCOPY;  Service: Gastroenterology;  Laterality: N/A;    ESOPHAGOGASTRODUODENOSCOPY N/A 12/14/2022    Procedure: EGD (ESOPHAGOGASTRODUODENOSCOPY);  Surgeon: David Lobo MD;  Location: [facility] OR;  Service: Gastroenterology;  Laterality: N/A;    JOINT REPLACEMENT      ULTRASOUND, ENDOSCOPIC, WITH FINE NEEDLE ASPIRATION N/A 12/14/2022    Procedure: Upper EUS w/poss FNA;  Surgeon: David Lobo MD;  Location: [facility] OR;  Service: Gastroenterology;  Laterality: N/A;  LABS WITH PROCEDURE.     Social History     Socioeconomic History    Marital status:    Tobacco Use    Smoking status: Never    Smokeless tobacco: Never   Substance and Sexual Activity    Alcohol use: Not Currently    Drug use: Never      No family history on file.   Review of patient's allergies indicates:  No Known Allergies   Review of Systems   Constitutional:  Positive for fatigue and fever. Negative for unexpected weight change.   HENT:  Negative for mouth dryness, nosebleeds and sore throat.    Respiratory:  Negative for chest tightness and shortness of breath.         Hemoptysis   Cardiovascular:  Negative for chest pain and palpitations.   Gastrointestinal:  Positive for abdominal pain and blood in stool. Negative for constipation and diarrhea.   Genitourinary:  Negative for difficulty urinating and dysuria.   Musculoskeletal:  Negative for arthralgias and back pain.   Integumentary:  Negative for rash.   Neurological:  Positive for weakness. Negative for headaches.   Hematological:  Negative for adenopathy.   Psychiatric/Behavioral:  Negative for agitation.        Objective:      Physical Exam  Vitals reviewed.   Constitutional:       General: He is awake.      Appearance: Normal  appearance.   HENT:      Head: Normocephalic and atraumatic.      Right Ear: Hearing normal.      Left Ear: Hearing normal.      Nose: Nose normal.   Eyes:      General: Lids are normal. Vision grossly intact.      Extraocular Movements: Extraocular movements intact.      Conjunctiva/sclera: Conjunctivae normal.   Cardiovascular:      Rate and Rhythm: Normal rate and regular rhythm.      Pulses: Normal pulses.      Heart sounds: Normal heart sounds.   Pulmonary:      Effort: Pulmonary effort is normal.      Breath sounds: Normal breath sounds. No wheezing, rhonchi or rales.   Abdominal:      General: Bowel sounds are normal. There is distension.      Palpations: Abdomen is soft.      Tenderness: There is abdominal tenderness.   Musculoskeletal:      Cervical back: Full passive range of motion without pain.      Right lower leg: No edema.      Left lower leg: No edema.   Lymphadenopathy:      Cervical: No cervical adenopathy.      Upper Body:      Right upper body: No supraclavicular or axillary adenopathy.      Left upper body: No supraclavicular or axillary adenopathy.   Skin:     General: Skin is warm.   Neurological:      General: No focal deficit present.      Mental Status: He is alert and oriented to person, place, and time.   Psychiatric:         Attention and Perception: Attention normal.         Mood and Affect: Mood and affect normal.         Behavior: Behavior is cooperative.       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        Plan:       Patient has findings concerning for a pancreatic versus gastric cancer with metastatic disease.  .    Patient underwent biopsy on 11/22/2022, this revealed necrotic cells.    Unfortunately, EUS that was done on 12/14/2022 could not define the source of the mass due to distorted anatomical landmarks, pathology only showed undifferentiated carcinoma    Patient will see MD  Marty on 01/10/2023.  I will see the patient after this visit.    1 units of blood today, continue weekly labs as scheduled    Lightweight wheelchair with elevated leg rest and seat cushion ordered    Will send referral for home health     Return to clinic as scheduled    Patient has a mobility limitation that significantly impairs his ability to participate in one or more mobility related ADLs. The patient's mobility limitation cannot be sufficiently resolved using a cane or walker as he has tried and failed these. The use of a wheelchair will significantly improve the patient's ability to participate in MRADLs and the patient will use it on a regular basis in the home. He has not expressed unwillingness to use the wheelchair that is provided for use in the home. The patient has a caregiver who is available, willing, and able to help with the wheelchair. The patient can self-propel in a standard manual wheelchair, in the home. Recommending a lightweight wheelchair to improve patients daily living with seat cushion to prevent skin breakdown as well as leg rest due to his leg weakness.       Trell Munoz II, MD I, Marcie Enamorado LPN, acted solely as a scribe for and in the presence of, Dr. Trell Munoz who performed the service.

## 2023-01-04 ENCOUNTER — LAB VISIT (OUTPATIENT)
Dept: LAB | Facility: HOSPITAL | Age: 83
End: 2023-01-04
Attending: INTERNAL MEDICINE
Payer: MEDICARE

## 2023-01-04 DIAGNOSIS — C80.1 CANCER OF UNKNOWN ORIGIN: ICD-10-CM

## 2023-01-04 DIAGNOSIS — R19.00 ABDOMINAL MASS, UNSPECIFIED ABDOMINAL LOCATION: ICD-10-CM

## 2023-01-04 DIAGNOSIS — D64.9 ANEMIA, UNSPECIFIED TYPE: ICD-10-CM

## 2023-01-04 DIAGNOSIS — C80.1 CANCER OF UNKNOWN ORIGIN: Primary | ICD-10-CM

## 2023-01-04 DIAGNOSIS — C80.1 UNDIFFERENTIATED CARCINOMA: ICD-10-CM

## 2023-01-04 LAB
ALBUMIN SERPL-MCNC: 2.2 G/DL (ref 3.4–4.8)
ALBUMIN/GLOB SERPL: 0.6 RATIO (ref 1.1–2)
ALP SERPL-CCNC: 197 UNIT/L (ref 40–150)
ALT SERPL-CCNC: 22 UNIT/L (ref 0–55)
AST SERPL-CCNC: 50 UNIT/L (ref 5–34)
BASOPHILS # BLD AUTO: 0.05 X10(3)/MCL (ref 0–0.2)
BASOPHILS NFR BLD AUTO: 0.1 %
BILIRUBIN DIRECT+TOT PNL SERPL-MCNC: 1 MG/DL
BUN SERPL-MCNC: 30.8 MG/DL (ref 8.4–25.7)
CALCIUM SERPL-MCNC: 8.5 MG/DL (ref 8.8–10)
CHLORIDE SERPL-SCNC: 95 MMOL/L (ref 98–107)
CO2 SERPL-SCNC: 22 MMOL/L (ref 23–31)
CREAT SERPL-MCNC: 1.12 MG/DL (ref 0.73–1.18)
EOSINOPHIL # BLD AUTO: 0.17 X10(3)/MCL (ref 0–0.9)
EOSINOPHIL NFR BLD AUTO: 0.3 %
ERYTHROCYTE [DISTWIDTH] IN BLOOD BY AUTOMATED COUNT: 18.6 % (ref 11.6–14.4)
GFR SERPLBLD CREATININE-BSD FMLA CKD-EPI: 66 MLS/MIN/1.73/M2
GLOBULIN SER-MCNC: 3.9 GM/DL (ref 2.4–3.5)
GLUCOSE SERPL-MCNC: 152 MG/DL (ref 82–115)
GROUP & RH: NORMAL
HCT VFR BLD AUTO: 18.1 % (ref 42–52)
HGB BLD-MCNC: 5.6 GM/DL (ref 14–18)
IMM GRANULOCYTES # BLD AUTO: 1.61 X10(3)/MCL (ref 0–0.04)
IMM GRANULOCYTES NFR BLD AUTO: 2.5 %
INDIRECT COOMBS GEL: NORMAL
LYMPHOCYTES # BLD AUTO: 2.63 X10(3)/MCL (ref 0.6–4.6)
LYMPHOCYTES NFR BLD AUTO: 4.1 %
MCH RBC QN AUTO: 31.5 PG
MCHC RBC AUTO-ENTMCNC: 30.9 MG/DL (ref 33–36)
MCV RBC AUTO: 101.7 FL (ref 80–94)
MONOCYTES # BLD AUTO: 2.48 X10(3)/MCL (ref 0.1–1.3)
MONOCYTES NFR BLD AUTO: 3.8 %
NEUTROPHILS # BLD AUTO: 57.89 X10(3)/MCL (ref 2.1–9.2)
NEUTROPHILS NFR BLD AUTO: 89.2 %
PLATELET # BLD AUTO: 399 X10(3)/MCL (ref 140–371)
PMV BLD AUTO: 8.3 FL (ref 9.4–12.4)
POTASSIUM SERPL-SCNC: 4.2 MMOL/L (ref 3.5–5.1)
PROT SERPL-MCNC: 6.1 GM/DL (ref 5.8–7.6)
RBC # BLD AUTO: 1.78 X10(6)/MCL (ref 4.7–6.1)
SODIUM SERPL-SCNC: 128 MMOL/L (ref 136–145)
WBC # SPEC AUTO: 64.8 X10(3)/MCL (ref 4.5–11.5)

## 2023-01-04 PROCEDURE — 36430 TRANSFUSION BLD/BLD COMPNT: CPT

## 2023-01-04 PROCEDURE — 86900 BLOOD TYPING SEROLOGIC ABO: CPT | Performed by: INTERNAL MEDICINE

## 2023-01-04 PROCEDURE — 36415 COLL VENOUS BLD VENIPUNCTURE: CPT

## 2023-01-04 PROCEDURE — 85025 COMPLETE CBC W/AUTO DIFF WBC: CPT

## 2023-01-04 PROCEDURE — 80053 COMPREHEN METABOLIC PANEL: CPT

## 2023-01-04 RX ORDER — DIPHENHYDRAMINE HYDROCHLORIDE 50 MG/ML
25 INJECTION INTRAMUSCULAR; INTRAVENOUS ONCE
Status: CANCELLED | OUTPATIENT
Start: 2023-01-04

## 2023-01-04 RX ORDER — ACETAMINOPHEN 325 MG/1
650 TABLET ORAL ONCE
Status: CANCELLED | OUTPATIENT
Start: 2023-01-04

## 2023-01-04 RX ORDER — HYDROCODONE BITARTRATE AND ACETAMINOPHEN 500; 5 MG/1; MG/1
TABLET ORAL ONCE
Status: CANCELLED | OUTPATIENT
Start: 2023-01-04 | End: 2023-01-04

## 2023-01-04 NOTE — PROGRESS NOTES
Patient with slight hyponatremia and increased BUN. Could have hypovolemic hyponatremia.  Can we add 1 liter NS after blood?

## 2023-01-05 ENCOUNTER — TELEPHONE (OUTPATIENT)
Dept: HEMATOLOGY/ONCOLOGY | Facility: CLINIC | Age: 83
End: 2023-01-05
Payer: MEDICARE

## 2023-01-05 NOTE — TELEPHONE ENCOUNTER
Patient's caregiver, Cassie states that patient is requesting stronger pain medication to take on his trip to [place] next week. He is afraid that the percocet that he has will not be strong enough for the long ride. Please advise.

## 2023-01-05 NOTE — TELEPHONE ENCOUNTER
Instructed Cassie, okay to increase dose to 2 tabs during drive, if needed. She voiced understanding.

## (undated) DEVICE — KIT SURGICAL COLON .25 1.1OZ

## (undated) DEVICE — PILLCAM SB3 EX EXTENDED

## (undated) DEVICE — COLLECTION SPECIMEN NEPTUNE

## (undated) DEVICE — TIP SUCTION YANKAUER

## (undated) DEVICE — KIT CANIST SUCTION 1200CC

## (undated) DEVICE — UNDERPAD DISPOSABLE 30X30IN

## (undated) DEVICE — SOL IRRI STRL WATER 1000ML

## (undated) DEVICE — TUBING O2 FEMALE CONN 13FT

## (undated) DEVICE — NDL BIOPSY SHARKCORE 22G

## (undated) DEVICE — CATH CUFF BLLN US RADIAL FLEX

## (undated) DEVICE — BLLN ULTRASONIC LINEAR FLEX